# Patient Record
Sex: FEMALE | Race: WHITE | NOT HISPANIC OR LATINO | Employment: OTHER | URBAN - METROPOLITAN AREA
[De-identification: names, ages, dates, MRNs, and addresses within clinical notes are randomized per-mention and may not be internally consistent; named-entity substitution may affect disease eponyms.]

---

## 2017-01-10 ENCOUNTER — GENERIC CONVERSION - ENCOUNTER (OUTPATIENT)
Dept: OTHER | Facility: OTHER | Age: 67
End: 2017-01-10

## 2017-02-20 DIAGNOSIS — N18.4 CHRONIC KIDNEY DISEASE, STAGE IV (SEVERE) (HCC): ICD-10-CM

## 2017-04-07 ENCOUNTER — GENERIC CONVERSION - ENCOUNTER (OUTPATIENT)
Dept: OTHER | Facility: OTHER | Age: 67
End: 2017-04-07

## 2017-04-15 ENCOUNTER — HOSPITAL ENCOUNTER (EMERGENCY)
Facility: HOSPITAL | Age: 67
Discharge: HOME | End: 2017-04-15
Attending: EMERGENCY MEDICINE
Payer: MEDICARE

## 2017-04-15 ENCOUNTER — APPOINTMENT (EMERGENCY)
Dept: RADIOLOGY | Facility: HOSPITAL | Age: 67
End: 2017-04-15
Payer: MEDICARE

## 2017-04-15 VITALS
DIASTOLIC BLOOD PRESSURE: 60 MMHG | RESPIRATION RATE: 18 BRPM | HEART RATE: 72 BPM | WEIGHT: 151.68 LBS | SYSTOLIC BLOOD PRESSURE: 136 MMHG | TEMPERATURE: 98.3 F | OXYGEN SATURATION: 100 % | BODY MASS INDEX: 24.48 KG/M2

## 2017-04-15 DIAGNOSIS — B97.89 VIRAL RESPIRATORY INFECTION: Primary | ICD-10-CM

## 2017-04-15 DIAGNOSIS — J98.8 VIRAL RESPIRATORY INFECTION: Primary | ICD-10-CM

## 2017-04-15 PROCEDURE — 99283 EMERGENCY DEPT VISIT LOW MDM: CPT

## 2017-04-15 PROCEDURE — 71020 HB CHEST X-RAY 2VW FRONTAL&LATL: CPT

## 2017-04-15 RX ORDER — CITALOPRAM 10 MG/1
10 TABLET ORAL DAILY
COMMUNITY

## 2017-04-15 RX ORDER — CYCLOSPORINE 0.5 MG/ML
1 EMULSION OPHTHALMIC 2 TIMES DAILY
COMMUNITY

## 2017-04-18 ENCOUNTER — ALLSCRIPTS OFFICE VISIT (OUTPATIENT)
Dept: OTHER | Facility: OTHER | Age: 67
End: 2017-04-18

## 2017-06-05 ENCOUNTER — ALLSCRIPTS OFFICE VISIT (OUTPATIENT)
Dept: OTHER | Facility: OTHER | Age: 67
End: 2017-06-05

## 2017-06-05 LAB
BILIRUB UR QL STRIP: NEGATIVE
CLARITY UR: NORMAL
COLOR UR: YELLOW
GLUCOSE (HISTORICAL): NEGATIVE
HGB UR QL STRIP.AUTO: NORMAL
KETONES UR STRIP-MCNC: NEGATIVE MG/DL
LEUKOCYTE ESTERASE UR QL STRIP: NORMAL
NITRITE UR QL STRIP: NEGATIVE
PH UR STRIP.AUTO: 5 [PH]
PROT UR STRIP-MCNC: 100 MG/DL
SP GR UR STRIP.AUTO: 1.02
UROBILINOGEN UR QL STRIP.AUTO: 0.2

## 2017-09-26 ENCOUNTER — GENERIC CONVERSION - ENCOUNTER (OUTPATIENT)
Dept: OTHER | Facility: OTHER | Age: 67
End: 2017-09-26

## 2017-10-03 ENCOUNTER — TRANSCRIBE ORDERS (OUTPATIENT)
Dept: LAB | Facility: CLINIC | Age: 67
End: 2017-10-03

## 2017-10-03 ENCOUNTER — ALLSCRIPTS OFFICE VISIT (OUTPATIENT)
Dept: OTHER | Facility: OTHER | Age: 67
End: 2017-10-03

## 2017-10-03 ENCOUNTER — APPOINTMENT (OUTPATIENT)
Dept: LAB | Facility: CLINIC | Age: 67
End: 2017-10-03
Payer: MEDICARE

## 2017-10-03 DIAGNOSIS — D64.9 ANEMIA, UNSPECIFIED: Primary | ICD-10-CM

## 2017-10-03 DIAGNOSIS — D64.9 ANEMIA, UNSPECIFIED: ICD-10-CM

## 2017-10-03 DIAGNOSIS — D64.9 ANEMIA: ICD-10-CM

## 2017-10-03 LAB
ABO GROUP BLD: NORMAL
BLD GP AB SCN SERPL QL: NEGATIVE
ERYTHROCYTE [DISTWIDTH] IN BLOOD BY AUTOMATED COUNT: 13.5 % (ref 11.6–15.1)
FERRITIN SERPL-MCNC: 304 NG/ML (ref 8–388)
HCT VFR BLD AUTO: 28.4 % (ref 34.8–46.1)
HGB BLD-MCNC: 8.5 G/DL (ref 11.5–15.4)
IRON SATN MFR SERPL: 11 %
IRON SERPL-MCNC: 39 UG/DL (ref 50–170)
LDH SERPL-CCNC: 295 U/L (ref 81–234)
MCH RBC QN AUTO: 30.6 PG (ref 26.8–34.3)
MCHC RBC AUTO-ENTMCNC: 29.9 G/DL (ref 31.4–37.4)
MCV RBC AUTO: 102 FL (ref 82–98)
PLATELET # BLD AUTO: 274 THOUSANDS/UL (ref 149–390)
PMV BLD AUTO: 9.5 FL (ref 8.9–12.7)
RBC # BLD AUTO: 2.78 MILLION/UL (ref 3.81–5.12)
RETICS # AUTO: NORMAL 10*3/UL (ref 14097–95744)
RETICS # CALC: 1.24 % (ref 0.37–1.87)
RH BLD: POSITIVE
SPECIMEN EXPIRATION DATE: NORMAL
TIBC SERPL-MCNC: 343 UG/DL (ref 250–450)
VIT B12 SERPL-MCNC: 1249 PG/ML (ref 100–900)
WBC # BLD AUTO: 10.5 THOUSAND/UL (ref 4.31–10.16)

## 2017-10-03 PROCEDURE — 83010 ASSAY OF HAPTOGLOBIN QUANT: CPT

## 2017-10-03 PROCEDURE — 82728 ASSAY OF FERRITIN: CPT

## 2017-10-03 PROCEDURE — 83615 LACTATE (LD) (LDH) ENZYME: CPT

## 2017-10-03 PROCEDURE — 83540 ASSAY OF IRON: CPT

## 2017-10-03 PROCEDURE — 86900 BLOOD TYPING SEROLOGIC ABO: CPT

## 2017-10-03 PROCEDURE — 82607 VITAMIN B-12: CPT

## 2017-10-03 PROCEDURE — 86850 RBC ANTIBODY SCREEN: CPT

## 2017-10-03 PROCEDURE — 85045 AUTOMATED RETICULOCYTE COUNT: CPT

## 2017-10-03 PROCEDURE — 36415 COLL VENOUS BLD VENIPUNCTURE: CPT

## 2017-10-03 PROCEDURE — 83550 IRON BINDING TEST: CPT

## 2017-10-03 PROCEDURE — 85027 COMPLETE CBC AUTOMATED: CPT

## 2017-10-03 PROCEDURE — 86901 BLOOD TYPING SEROLOGIC RH(D): CPT

## 2017-10-03 PROCEDURE — 82668 ASSAY OF ERYTHROPOIETIN: CPT

## 2017-10-04 LAB
EPO SERPL-ACNC: 18.6 MIU/ML (ref 2.6–18.5)
HAPTOGLOB SERPL-MCNC: 200 MG/DL (ref 34–200)

## 2017-10-04 NOTE — CONSULTS
Assessment  1  Anemia (285 9) (D64 9)    Plan  Anemia    · (1) CBC/ PLT (NO DIFF); Status:Active; Requested SRD:[id number];    Perform:[facility]; FEX:[id number]; Ordered; Tim Deleon; Ordered By:Rosalba Abdalla;   · (1) ERYTHROPOIETIN; Status:Active; Requested YGL:[id number];    Perform:[facility]; OBN:[id number]; Ordered; Tim Deleon; Ordered By:Rosalba Abdalla;   · (1) HAPTOGLOBIN; Status:Active; Requested VTW:[id number];    Perform:[facility]; BKJ:[id number]; Ordered; Tim Deleon; Ordered By:Rosalba Abdalla;   · (1) IRON PANEL; Status:Active; Requested WQE:[id number];    Perform:[facility]; DQS:[id number]; Ordered; Tim Deleon; Ordered By:Rosalba Abdalla;   · (1) LD (LDH); Status:Active; Requested MTF:[id number];    Perform:[facility]; BWX:[id number]; Ordered; Tim Deleon; Ordered By:Rosalba Abdalla;   · (1) RETICULOCYTE COUNT; Status:Active; Requested WMQ:[id number];    Perform:[facility]; VZJ:[id number]; Ordered; Tim Deleon; Ordered By:Rosalba Abdalla;   · (1) TYPE & SCREEN; Status:Active; Requested WAF:[id number];    Perform:[facility]; FMR:[id number]; Ordered; Tim Deleon; Ordered By:Rosalba Abdalla;  currently receiving a biologic? : Yes  the patient pregnant or have they been in the last 90 days? : Yes  the patient been transfused in the last 3 months? : Yes  or PreOp : Medical   · (1) VITAMIN B12; Status:Active; Requested GZP:[id number];    Perform:[facility]; QRE:[id number]; Ordered; Tim Deleon; Ordered By:Rosalba Abdalla;   · Follow-up visit in 1 week Evaluation and Treatment  Follow-up  Status: Complete  Done:  [id number]   Ordered; For: Anemia; Ordered By: Chandu Arguello Performed:  Order Comments: blood work todayplease shcedule blood transfusion x 1 unit next weekF/U in one week with Dr Reena Medrano, then plan for transfusion afterwards  Due: 69FXU9598; Last Updated By: Cornelius Acosta; 10/3/2017 2:02:39 PM    Discussion/Summary    Anemia : multifactorial, likely drug - related and CKD  no blood loss or hemolysis  However tacrolimus has been reported to cause of hemolysis, need to rule out  CBC, CMP, haptoglobin, LDH, iron panel, B12 level, retic count, erythropoietin level  she is having symptoms, favoring blood transfusion, depending on her hemoglobin level  Will also give Aranesp if her erythropoietin level is low, which could due to CKD  on her level decided whether she should take iron or no  in 1 week  Chief Complaint   I have anemia       History of Present Illness  Pt is a pleasant 68-year-old female, with history of idiopathic pulmonary fibrosis, status post bilateral lung transplant in [facility] in [place], history of right femur fracture status post ORIF and blood transfusion due to anemia, possible CKD, currently on tacrolimus and CellCept for immunosuppression due to lung transplant, following a hematologist in [facility] for possible medication related anemia and leukocytopenia, referred to local hematology for comanagement  came in today with her , reported feeling positional lightheadedness and exertional fatigue for at least 6 months, possibly year  She otherwise reported no new weight loss, acute energy level change, fever, night sweat, lumps bumps, bleeding  She reported no change of her skin color, no hemoptysis, normal color of her urine and stool  She has been compliant with her tacrolimus and CellCept  She received multiple blood transfusions, last transfusion last summer after hip surgery  is a nonsmoker, no drinking ETOH, lives with her , no prior hematology, oncology issues before lung transplant  She will continue to follow her hematologist in [facility]  1, anemia: July 7, 2016, status post bone marrow biopsy to evaluate pancytopenia, result is not revealing, possible early MDS  Received a blood transfusion during surgery, last transfusion June of 2016  leukocytopenia, neutropenia: Received Neupogen, Neulasta, developed significant side-effect , including bone pain       Review of Systems    Constitutional: feeling poorly-and-feeling tired, but-no fever,-no recent weight gain,-no chills-and-no recent weight loss  Eyes: No complaints of eye pain, no red eyes, no eyesight problems, no discharge, no dry eyes, no itching of eyes  ENT: no complaints of earache, no loss of hearing, no nose bleeds, no nasal discharge, no sore throat, no hoarseness  Cardiovascular: No complaints of slow heart rate, no fast heart rate, no chest pain, no palpitations, no leg claudication, no lower extremity edema  Respiratory: No complaints of shortness of breath, no wheezing, no cough, no SOB on exertion, no orthopnea, no PND  Gastrointestinal: No complaints of abdominal pain, no constipation, no nausea or vomiting, no diarrhea, no bloody stools  Genitourinary: No complaints of dysuria, no incontinence, no pelvic pain, no dysmenorrhea, no vaginal discharge or bleeding  Musculoskeletal: No complaints of arthralgias, no myalgias, no joint swelling or stiffness, no limb pain or swelling  Integumentary: No complaints of skin rash or lesions, no itching, no skin wounds, no breast pain or lump  Neurological: No complaints of headache, no confusion, no convulsions, no numbness, no dizziness or fainting, no tingling, no limb weakness, no difficulty walking  Psychiatric: Not suicidal, no sleep disturbance, no anxiety or depression, no change in personality, no emotional problems  Endocrine: No complaints of proptosis, no hot flashes, no muscle weakness, no deepening of the voice, no feelings of weakness  Hematologic/Lymphatic: No complaints of swollen glands, no swollen glands in the neck, does not bleed easily, does not bruise easily  Active Problems  1  Abnormal findings on diagnostic imaging of other specified body structures (793 99)   (R93 8)   2  Abnormal liver function test (790 6) (R79 89)   3  Acute bronchitis (466 0) (J20 9)   4   Acute Interstitial Lung Disease (136 3)   5  Acute renal failure (584 9) (N17 9)   6  Acute upper respiratory infection (465 9) (J06 9)   7  Anemia (285 9) (D64 9)   8  Anxiety (300 00) (F41 9)   9  Arthritis (716 90) (M19 90)   10  Cervical pain (neck) (723 1) (M54 2)   11  Cervical smear, as part of routine gynecological examination (V76 2) (Z01 419)   12  Cough (786 2) (R05)   13  Depression (311) (F32 9)   14  Encounter for monitoring cardiotoxic drug therapy (V58 83,V58 69) (Z51 81,Z79 899)   15  Encounter for routine gynecological examination (V72 31) (Z01 419)   16  Hypercholesterolemia (272 0) (E78 00)   17  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)   18  Idiopathic pulmonary fibrosis (516 31) (J84 112)   19  Lumbar canal stenosis (724 02) (M48 061)   20  Muscle weakness (728 87) (M62 81)   21  Nonspecific abnormal toxicological finding (796 0) (R89 2)   22  Right calf pain (729 5) (M79 661)   23  Screening for genitourinary condition (V81 6) (Z13 89)   24  Screening for neurological condition (V80 09) (Z13 89)   25  Segmental and somatic dysfunction of sacral region (739 4) (M99 04)   26  Skin rash (782 1) (R21)   27  Sudden visual loss (368 11) (H53 139)   28  Trapezius muscle spasm (728 85) (M62 838)   29  Urinary tract infection (599 0) (N39 0)   30  Visit for gynecologic examination (V72 31) (Z01 419)   31  Visit for pre-operative examination (V72 84) (A94 780)    Past Medical History    The active problems and past medical history were reviewed and updated today  Surgical History  1  History of Appendectomy   2  History of Arthroscopy Knee   3  History of Tonsillectomy    The surgical history was reviewed and updated today  Family History  Mother    1  Family history of Hypertension (V17 49)   2  Family history of Malignant Melanoma Of The Skin (V16 8)  Father    3  Family history of Chronic Obstructive Pulmonary Disease   4  Family history of Essential Hypertension   5  Family history of Lung Cancer (V16 1)  Sister    6  Family history of Systemic Lupus Erythematosus    The family history was reviewed and updated today  Social History   · Denied: History of Alcohol Use (History)   · Denied: Alcohol Use (History)   · Daily Coffee Consumption (___ Cups/Day)   · Daily Cola Consumption (___ Cans/Day)   · Daily Tea Consumption (___ Cups/Day)   · Denied: Drug Use (305 90)   · Marital History - Currently    · Never A Smoker  The social history was reviewed and updated today  Current Meds   1  Atovaquone 750 MG/5ML Oral Suspension; 1 tsp q 12 hrs Recorded   2  B Complex Oral Tablet; Take 1 tablet daily Recorded   3  Calcium 600 + D TABS; Therapy: ([phone] [id number]) to Recorded   4  Ciprofloxacin HCl - 250 MG Oral Tablet; TAKE 1 TABLET EVERY 12 HOURS DAILY; Therapy: 44OIB7577 to (Evaluate:15Jun2017)  Requested for: 75JTV0042; Last   Rx:05Jun2017 Ordered   5  Citalopram Hydrobromide 10 MG Oral Tablet; Take 1 tablet daily Recorded   6  [employer] 8 6 MG Oral Tablet; Take as directed Recorded   7  Docusate Sodium 100 MG Oral Capsule; Therapy: (029 [id number]) to Recorded   8  Forteo 600 MCG/2 4ML Subcutaneous Solution; INJECT SUBCUTANEOUSLY AS   DIRECTED Recorded   9  Gabapentin 300 MG Oral Capsule; TAKE 1 CAPSULE 3 times daily Recorded   10  Gabapentin 300 MG TABS; Therapy: (Recorded:03Oct2017) to Recorded   11  Ipratropium Bromide 0 06 % Nasal Solution; USE 2 SPRAYS IN EACH NOSTRIL 3 TIMES    DAILY; Therapy: (Recorded:90Rbz2281) to Recorded   12  Mag64 535 (64 Mg) MG Oral Tablet Extended Release; TAKE 2 TABLET Daily Recorded   13  Metoprolol Tartrate 25 MG Oral Tablet; Therapy: ([phone]) to Recorded   14  Multivitamins Oral Capsule; Therapy: (Marisol Valiente) to Recorded   15  Mycophenolate Mofetil 250 MG Oral Capsule; Therapy: (029 [id number]) to Recorded   16  Pantoprazole Sodium 40 MG Oral Tablet Delayed Release; Take 1 tablet daily Recorded   17   Pravastatin Sodium 40 MG Oral Tablet; Take 1 tablet daily Recorded   18  PredniSONE 5 MG Oral Tablet; Take 1 tablet daily Recorded   19  Restasis 0 05 % Ophthalmic Emulsion; USE AS DIRECTED Recorded   20  Tacrolimus CAPS; Therapy: [phone]) to Recorded   21  TraMADol HCl - 50 MG Oral Tablet; Take 1 tablet every 6 hours as needed for pain    Recorded    Allergies  1  Bactrim TABS    Vitals  Vital Signs    Recorded: 62WWG8386 01:16PM   Temperature 97 4 F   Heart Rate 107   Respiration 16   Systolic 869   Diastolic 84   Height 5 ft 7 in   Weight 160 lb 5 oz   BMI Calculated 25 11   BSA Calculated 1 84   O2 Saturation 97   Pain Scale 0     Physical Exam    Constitutional   General appearance: No acute distress, well appearing and well nourished  Eyes   Conjunctiva and lids: No swelling, erythema or discharge  Ears, Nose, Mouth, and Throat   External inspection of ears and nose: Normal     Pulmonary   Respiratory effort: No increased work of breathing or signs of respiratory distress  Auscultation of lungs: Abnormal  -decreased breathing sounds  Cardiovascular   Palpation of heart: Normal PMI, no thrills  Auscultation of heart: Normal rate and rhythm, normal S1 and S2, without murmurs  Examination of extremities for edema and/or varicosities: Normal     Carotid pulses: Normal     Abdomen   Abdomen: Non-tender, no masses  Liver and spleen: No hepatomegaly or splenomegaly  Lymphatic   Palpation of lymph nodes in neck: No lymphadenopathy  Musculoskeletal   Gait and station: Normal     Digits and nails: Normal without clubbing or cyanosis  Inspection/palpation of joints, bones, and muscles: Normal     Skin   Skin and subcutaneous tissue: Normal without rashes or lesions      Psychiatric   Orientation to person, place, and time: Normal     Mood and affect: Normal         ECOG 2       Future Appointments    Date/Time Provider Specialty Site   10/10/2017 01:00 PM Rigoberto Merrill MD Hematology Oncology [facility] [place] [facility]     Signatures   Electronically signed by : Malina Liang MD; Oct  3 2017  4:31PM EST                       (Author)

## 2017-10-10 ENCOUNTER — GENERIC CONVERSION - ENCOUNTER (OUTPATIENT)
Dept: OTHER | Facility: OTHER | Age: 67
End: 2017-10-10

## 2017-10-23 RX ORDER — SODIUM CHLORIDE 9 MG/ML
20 INJECTION, SOLUTION INTRAVENOUS CONTINUOUS
Status: DISCONTINUED | OUTPATIENT
Start: 2017-10-24 | End: 2017-10-27 | Stop reason: HOSPADM

## 2017-10-24 ENCOUNTER — HOSPITAL ENCOUNTER (OUTPATIENT)
Dept: INFUSION CENTER | Facility: CLINIC | Age: 67
Discharge: HOME | End: 2017-10-24
Payer: MEDICARE

## 2017-10-24 VITALS
DIASTOLIC BLOOD PRESSURE: 65 MMHG | SYSTOLIC BLOOD PRESSURE: 143 MMHG | TEMPERATURE: 97.1 F | HEART RATE: 83 BPM | RESPIRATION RATE: 20 BRPM

## 2017-10-24 PROCEDURE — 96365 THER/PROPH/DIAG IV INF INIT: CPT

## 2017-10-24 PROCEDURE — 96375 TX/PRO/DX INJ NEW DRUG ADDON: CPT

## 2017-10-24 RX ADMIN — DIPHENHYDRAMINE HYDROCHLORIDE 25 MG: 50 INJECTION, SOLUTION INTRAMUSCULAR; INTRAVENOUS at 09:49

## 2017-10-24 RX ADMIN — SODIUM CHLORIDE 20 ML/HR: 0.9 INJECTION, SOLUTION INTRAVENOUS at 09:49

## 2017-10-24 RX ADMIN — IRON SUCROSE 200 MG: 20 INJECTION, SOLUTION INTRAVENOUS at 10:07

## 2017-10-24 NOTE — PLAN OF CARE
Problem: Potential for Falls  Goal: Patient will remain free of falls  INTERVENTIONS:  - Assess patient frequently for physical needs  -  Identify cognitive and physical deficits and behaviors that affect risk of falls    -  [place] fall precautions as indicated by assessment   - Educate patient/family on patient safety including physical limitations  - Instruct patient to call for assistance with activity based on assessment  - Modify environment to reduce risk of injury  - Consider OT/PT consult to assist with strengthening/mobility   Outcome: Progressing

## 2017-10-24 NOTE — PROGRESS NOTES
Pt resting with no complaints  IV started without issue  Vitals stable; callbell within reach  Will continue to monitor

## 2017-10-26 RX ORDER — SODIUM CHLORIDE 9 MG/ML
20 INJECTION, SOLUTION INTRAVENOUS CONTINUOUS
Status: DISCONTINUED | OUTPATIENT
Start: 2017-10-30 | End: 2017-11-02 | Stop reason: HOSPADM

## 2017-10-30 ENCOUNTER — HOSPITAL ENCOUNTER (OUTPATIENT)
Dept: INFUSION CENTER | Facility: CLINIC | Age: 67
Discharge: HOME | End: 2017-10-30
Payer: MEDICARE

## 2017-10-30 VITALS
SYSTOLIC BLOOD PRESSURE: 146 MMHG | TEMPERATURE: 97.9 F | HEART RATE: 89 BPM | RESPIRATION RATE: 20 BRPM | DIASTOLIC BLOOD PRESSURE: 67 MMHG

## 2017-10-30 PROCEDURE — 96367 TX/PROPH/DG ADDL SEQ IV INF: CPT

## 2017-10-30 PROCEDURE — 96365 THER/PROPH/DIAG IV INF INIT: CPT

## 2017-10-30 RX ADMIN — IRON SUCROSE 200 MG: 20 INJECTION, SOLUTION INTRAVENOUS at 14:17

## 2017-10-30 RX ADMIN — DIPHENHYDRAMINE HYDROCHLORIDE 25 MG: 50 INJECTION, SOLUTION INTRAMUSCULAR; INTRAVENOUS at 13:50

## 2017-10-30 RX ADMIN — SODIUM CHLORIDE 20 ML/HR: 0.9 INJECTION, SOLUTION INTRAVENOUS at 13:45

## 2017-11-06 RX ORDER — SODIUM CHLORIDE 9 MG/ML
20 INJECTION, SOLUTION INTRAVENOUS CONTINUOUS
Status: DISCONTINUED | OUTPATIENT
Start: 2017-11-07 | End: 2017-11-10 | Stop reason: HOSPADM

## 2017-11-07 ENCOUNTER — HOSPITAL ENCOUNTER (OUTPATIENT)
Dept: INFUSION CENTER | Facility: CLINIC | Age: 67
Discharge: HOME | End: 2017-11-07
Payer: MEDICARE

## 2017-11-07 VITALS
DIASTOLIC BLOOD PRESSURE: 66 MMHG | RESPIRATION RATE: 18 BRPM | TEMPERATURE: 97.1 F | SYSTOLIC BLOOD PRESSURE: 129 MMHG | HEART RATE: 79 BPM

## 2017-11-07 PROCEDURE — 96367 TX/PROPH/DG ADDL SEQ IV INF: CPT

## 2017-11-07 PROCEDURE — 96365 THER/PROPH/DIAG IV INF INIT: CPT

## 2017-11-07 RX ADMIN — IRON SUCROSE 200 MG: 20 INJECTION, SOLUTION INTRAVENOUS at 12:02

## 2017-11-07 RX ADMIN — SODIUM CHLORIDE 20 ML/HR: 0.9 INJECTION, SOLUTION INTRAVENOUS at 11:40

## 2017-11-07 RX ADMIN — DIPHENHYDRAMINE HYDROCHLORIDE 25 MG: 50 INJECTION, SOLUTION INTRAMUSCULAR; INTRAVENOUS at 11:43

## 2017-11-07 NOTE — PLAN OF CARE
Problem: Potential for Falls  Goal: Patient will remain free of falls  INTERVENTIONS:  - Assess patient frequently for physical needs  -  Identify cognitive and physical deficits and behaviors that affect risk of falls    -  [place] fall precautions as indicated by assessment   - Educate patient/family on patient safety including physical limitations  - Instruct patient to call for assistance with activity based on assessment  - Modify environment to reduce risk of injury  - Consider OT/PT consult to assist with strengthening/mobility   Outcome: Progressing

## 2017-11-07 NOTE — PROGRESS NOTES
Pt to clinic for dose 3 of 3 planned doses of venofer  She reports tolerating previous doses well and offers no c/o today

## 2017-11-13 RX ORDER — SODIUM CHLORIDE 9 MG/ML
20 INJECTION, SOLUTION INTRAVENOUS CONTINUOUS
Status: DISCONTINUED | OUTPATIENT
Start: 2017-11-14 | End: 2017-11-17 | Stop reason: HOSPADM

## 2017-11-14 ENCOUNTER — HOSPITAL ENCOUNTER (OUTPATIENT)
Dept: INFUSION CENTER | Facility: CLINIC | Age: 67
Discharge: HOME | End: 2017-11-14
Payer: MEDICARE

## 2017-11-14 VITALS
HEART RATE: 78 BPM | TEMPERATURE: 97.9 F | RESPIRATION RATE: 18 BRPM | SYSTOLIC BLOOD PRESSURE: 127 MMHG | DIASTOLIC BLOOD PRESSURE: 61 MMHG

## 2017-11-14 PROCEDURE — 96365 THER/PROPH/DIAG IV INF INIT: CPT

## 2017-11-14 PROCEDURE — 96375 TX/PRO/DX INJ NEW DRUG ADDON: CPT

## 2017-11-14 RX ADMIN — SODIUM CHLORIDE 20 ML/HR: 0.9 INJECTION, SOLUTION INTRAVENOUS at 11:44

## 2017-11-14 RX ADMIN — DIPHENHYDRAMINE HYDROCHLORIDE 25 MG: 50 INJECTION, SOLUTION INTRAMUSCULAR; INTRAVENOUS at 11:44

## 2017-11-14 RX ADMIN — IRON SUCROSE 200 MG: 20 INJECTION, SOLUTION INTRAVENOUS at 12:01

## 2017-11-14 NOTE — PLAN OF CARE
Problem: Potential for Falls  Goal: Patient will remain free of falls  INTERVENTIONS:  - Assess patient frequently for physical needs  -  Identify cognitive and physical deficits and behaviors that affect risk of falls    -  [place] fall precautions as indicated by assessment   - Educate patient/family on patient safety including physical limitations  - Instruct patient to call for assistance with activity based on assessment  - Modify environment to reduce risk of injury  - Consider OT/PT consult to assist with strengthening/mobility   Outcome: Progressing

## 2017-11-21 ENCOUNTER — HOSPITAL ENCOUNTER (OUTPATIENT)
Dept: INFUSION CENTER | Facility: CLINIC | Age: 67
Discharge: HOME | End: 2017-11-21
Payer: MEDICARE

## 2017-11-21 VITALS — SYSTOLIC BLOOD PRESSURE: 122 MMHG | DIASTOLIC BLOOD PRESSURE: 60 MMHG | HEART RATE: 79 BPM

## 2017-11-21 PROCEDURE — 96372 THER/PROPH/DIAG INJ SC/IM: CPT

## 2017-11-21 RX ADMIN — DARBEPOETIN ALFA 100 MCG: 100 INJECTION, SOLUTION INTRAVENOUS; SUBCUTANEOUS at 14:43

## 2017-11-21 NOTE — PLAN OF CARE
Problem: Potential for Falls  Goal: Patient will remain free of falls  INTERVENTIONS:  - Assess patient frequently for physical needs  -  Identify cognitive and physical deficits and behaviors that affect risk of falls    -  [place] fall precautions as indicated by assessment   - Educate patient/family on patient safety including physical limitations  - Instruct patient to call for assistance with activity based on assessment  - Modify environment to reduce risk of injury  - Consider OT/PT consult to assist with strengthening/mobility   Outcome: Progressing

## 2017-11-21 NOTE — PROGRESS NOTES
Patient here for aranesp and is doing well, she tolerated aranesp as ordered QUINTON, bandaid applied  Next dosing 11/28/17

## 2017-11-28 ENCOUNTER — HOSPITAL ENCOUNTER (OUTPATIENT)
Dept: INFUSION CENTER | Facility: CLINIC | Age: 67
Discharge: HOME | End: 2017-11-28
Payer: MEDICARE

## 2017-11-28 PROCEDURE — 96372 THER/PROPH/DIAG INJ SC/IM: CPT

## 2017-11-28 RX ADMIN — DARBEPOETIN ALFA 100 MCG: 100 INJECTION, SOLUTION INTRAVENOUS; SUBCUTANEOUS at 13:03

## 2017-11-28 NOTE — PROGRESS NOTES
Pt resting with no complaints  Aranesp given in [facility] without issue  Declined AVS  Pt aware of next appt

## 2017-11-28 NOTE — PLAN OF CARE
Problem: Potential for Falls  Goal: Patient will remain free of falls  INTERVENTIONS:  - Assess patient frequently for physical needs  -  Identify cognitive and physical deficits and behaviors that affect risk of falls    -  [place] fall precautions as indicated by assessment   - Educate patient/family on patient safety including physical limitations  - Instruct patient to call for assistance with activity based on assessment  - Modify environment to reduce risk of injury  - Consider OT/PT consult to assist with strengthening/mobility   Outcome: Progressing

## 2017-12-05 ENCOUNTER — HOSPITAL ENCOUNTER (OUTPATIENT)
Dept: INFUSION CENTER | Facility: CLINIC | Age: 67
Discharge: HOME | End: 2017-12-05
Payer: MEDICARE

## 2017-12-05 VITALS
HEART RATE: 85 BPM | TEMPERATURE: 97.9 F | SYSTOLIC BLOOD PRESSURE: 138 MMHG | OXYGEN SATURATION: 99 % | DIASTOLIC BLOOD PRESSURE: 65 MMHG | RESPIRATION RATE: 18 BRPM

## 2017-12-05 PROCEDURE — 96372 THER/PROPH/DIAG INJ SC/IM: CPT

## 2017-12-05 RX ADMIN — DARBEPOETIN ALFA 100 MCG: 100 INJECTION, SOLUTION INTRAVENOUS; SUBCUTANEOUS at 10:26

## 2017-12-12 ENCOUNTER — HOSPITAL ENCOUNTER (OUTPATIENT)
Dept: INFUSION CENTER | Facility: CLINIC | Age: 67
Discharge: HOME | End: 2017-12-12
Payer: MEDICARE

## 2017-12-12 VITALS
DIASTOLIC BLOOD PRESSURE: 58 MMHG | RESPIRATION RATE: 18 BRPM | HEART RATE: 84 BPM | SYSTOLIC BLOOD PRESSURE: 132 MMHG | OXYGEN SATURATION: 97 % | TEMPERATURE: 98.3 F

## 2017-12-12 PROCEDURE — 96372 THER/PROPH/DIAG INJ SC/IM: CPT

## 2017-12-12 RX ADMIN — DARBEPOETIN ALFA 100 MCG: 100 INJECTION, SOLUTION INTRAVENOUS; SUBCUTANEOUS at 09:59

## 2017-12-19 ENCOUNTER — ALLSCRIPTS OFFICE VISIT (OUTPATIENT)
Dept: OTHER | Facility: OTHER | Age: 67
End: 2017-12-19

## 2017-12-20 NOTE — PROGRESS NOTES
Assessment  1  Anemia (285 9) (D64 9)   2  Stage 4 chronic kidney disease (585 4) (N18 4)    Plan  Stage 4 chronic kidney disease    · (1) IRON PANEL; Status:Active; Requested for:[id number];    Perform:[facility]; C:[id number]; Ordered; For:Stage 4 chronic kidney disease; Ordered By:Guera Abdalla;   · Follow-up visit in 10 weeks Evaluation and Treatment  please shceudle procrit / venofer x4 weekly after [facility] i n the end of 2/2018Follow-up after blood work  Status: Complete  Done: [id number] 10:00AM   Ordered; For: Stage 4 chronic kidney disease; Ordered By: Catrachita Zuniga Performed:  Due: [id number]; Last Updated By: Nirav Officer; 12/19/2017 2:04:00 PM    Discussion/Summary  Discussion Summary:   Anemia : Normocytic; multifactorial, likely drug (tacrolimus) - related , and CKD  repeat work up showed no blood loss or hemolysis  -- iron panel showed saturation 11%, ferritin 304  After 4 weekly treatment of N0 for an Aranesp, hemoglobin slightly increased from 7 7-8 4  Goal of hemoglobin is from 10-11  plan--continue 4 more iv iron Venofer 300 mg iv weekly x 4; then give Aranesp 100 mcg weekly x 4-- repeat CBC, iron panel  B12 in the end of February  --Call for any severe pain or bleeding  Follow-up appointment in February-- pt will also follow with Hematologist at [facility]  Counseling Documentation With Imm: The patient was counseled regarding diagnostic results  total time of encounter was 25 minutes-- and-- 15 minutes was spent counseling  Chief Complaint  Chief Complaint Free Text Note Form: follow up for anemia      History of Present Illness  HPI: Pt came in today for follow up with her   She reported overall at her baseline health status  She has no bleeding; she has mild chronic dizziness has been going on for months; She has no palpitation, chest pain  Her energy / mobility is at baseline  Pt is a pleasant 58-year-old female, with history of idiopathic pulmonary fibrosis, status post bilateral lung transplant in [facility] in [place], history of right femur fracture status post ORIF and blood transfusion due to anemia, possible CKD, currently on tacrolimus and CellCept for immunosuppression due to lung transplant, following a hematologist in [facility] for possible medication related anemia and leukocytopenia, referred to local hematology for comanagement  She reported no new weight loss, acute energy level change, fever, night sweat, lumps bumps, bleeding  She reported no change of her skin color, no hemoptysis, normal color of her urine and stool  She has been compliant with her tacrolimus and CellCept  She received multiple blood transfusions, last transfusion last summer after hip surgery  She is a nonsmoker, no drinking ETOH, lives with her , no prior hematology, oncology issues before lung transplant  She will continue to follow her hematologist in [place]  Previous Therapy:   1, anemia: July 7, 2016, status post bone marrow biopsy to evaluate pancytopenia, result is not revealing, possible early MDS  Received a blood transfusion during surgery, last transfusion June of 2016  Her post-lung transplant Hb is in the range of 8-10, lowest 7 5 2, leukocytopenia, neutropenia: Received Neupogen, Neulasta, developed significant side-effect , including bone pain  Interval History: Anemia, secondary to CKD, tacrolimus, frequent blood draw  On Venofer 300 milligrams, Aranesp 100 milligrams weekly  Received 4 weekly treatment of and for Aranesp in October to November 2017  Hemoglobin increased from 7 7-8  4  Patient and family came in today for follow-up  Reported energy level has been increasing, there is no bleeding  Still taking tacrolimus  Being followed in [facility] for hematology, endocrinology, transplant doctors  Patient has no specific complaints        Review of Systems  Complete-Female:  Constitutional: feeling poorly-- and-- feeling tired, but-- no fever,-- no recent weight gain,-- no chills-- and-- no recent weight loss  Eyes: No complaints of eye pain, no red eyes, no eyesight problems, no discharge, no dry eyes, no itching of eyes  ENT: no complaints of earache, no loss of hearing, no nose bleeds, no nasal discharge, no sore throat, no hoarseness  Cardiovascular: No complaints of slow heart rate, no fast heart rate, no chest pain, no palpitations, no leg claudication, no lower extremity edema  Respiratory: No complaints of shortness of breath, no wheezing, no cough, no SOB on exertion, no orthopnea, no PND  Gastrointestinal: No complaints of abdominal pain, no constipation, no nausea or vomiting, no diarrhea, no bloody stools  Genitourinary: No complaints of dysuria, no incontinence, no pelvic pain, no dysmenorrhea, no vaginal discharge or bleeding  Musculoskeletal: No complaints of arthralgias, no myalgias, no joint swelling or stiffness, no limb pain or swelling  Integumentary: No complaints of skin rash or lesions, no itching, no skin wounds, no breast pain or lump  Neurological: No complaints of headache, no confusion, no convulsions, no numbness, no dizziness or fainting, no tingling, no limb weakness, no difficulty walking  Psychiatric: Not suicidal, no sleep disturbance, no anxiety or depression, no change in personality, no emotional problems  Endocrine: No complaints of proptosis, no hot flashes, no muscle weakness, no deepening of the voice, no feelings of weakness  Hematologic/Lymphatic: No complaints of swollen glands, no swollen glands in the neck, does not bleed easily, does not bruise easily  Active Problems  1  Abnormal findings on diagnostic imaging of other specified body structures (793 99) (R93 8)   2  Abnormal liver function test (790 6) (R79 89)   3  Acute bronchitis (466 0) (J20 9)   4  Acute Interstitial Lung Disease (136 3)   5  Acute renal failure (584 9) (N17 9)   6  Acute upper respiratory infection (465 9) (J06 9)   7   Anemia (285  9) (D64 9)   8  Anxiety (300 00) (F41 9)   9  Arthritis (716 90) (M19 90)   10  Cervical pain (neck) (723 1) (M54 2)   11  Cervical smear, as part of routine gynecological examination (V76 2) (Z01 419)   12  Cough (786 2) (R05)   13  Depression (311) (F32 9)   14  Encounter for monitoring cardiotoxic drug therapy (V58 83,V58 69) (Z51 81,Z79 899)   15  Encounter for routine gynecological examination (V72 31) (Z01 419)   16  Hypercholesterolemia (272 0) (E78 00)   17  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)   18  Idiopathic pulmonary fibrosis (516 31) (J84 112)   19  Lumbar canal stenosis (724 02) (M48 061)   20  Muscle weakness (728 87) (M62 81)   21  Nonspecific abnormal toxicological finding (796 0) (R89 2)   22  Right calf pain (729 5) (M79 661)   23  Screening for genitourinary condition (V81 6) (Z13 89)   24  Screening for neurological condition (V80 09) (Z13 89)   25  Segmental and somatic dysfunction of sacral region (739 4) (M99 04)   26  Skin rash (782 1) (R21)   27  Sudden visual loss (368 11) (H53 139)   28  Trapezius muscle spasm (728 85) (M62 838)   29  Urinary tract infection (599 0) (N39 0)   30  Visit for gynecologic examination (V72 31) (Z01 419)   31  Visit for pre-operative examination (V72 84) (H36 151)    Past Medical History  Active Problems And Past Medical History Reviewed: The active problems and past medical history were reviewed and updated today  Surgical History  1  History of Appendectomy   2  History of Arthroscopy Knee   3  History of Tonsillectomy  Surgical History Reviewed: The surgical history was reviewed and updated today  Family History  Mother    1  Family history of Hypertension (V17 49)   2  Family history of Malignant Melanoma Of The Skin (V16 8)  Father    3  Family history of Chronic Obstructive Pulmonary Disease   4  Family history of Essential Hypertension   5  Family history of Lung Cancer (V16 1)  Sister    6   Family history of Systemic Lupus Erythematosus  Family History Reviewed: The family history was reviewed and updated today  Social History   · Denied: History of Alcohol Use (History)   · Denied: Alcohol Use (History)   · Daily Coffee Consumption (___ Cups/Day)   · Daily Cola Consumption (___ Cans/Day)   · Daily Tea Consumption (___ Cups/Day)   · Denied: Drug Use (305 90)   · Marital History - Currently    · Never A Smoker  Social History Reviewed: The social history was reviewed and updated today  Current Meds   1  Atovaquone 750 MG/5ML Oral Suspension; 1 tsp q 12 hrs Recorded   2  B Complex Oral Tablet; Take 1 tablet daily Recorded   3  Calcium 600 + D TABS; Therapy: (027 14 173) to Recorded   4  Citalopram Hydrobromide 10 MG Oral Tablet; Take 1 tablet daily Recorded   5  [employer] 8 6 MG Oral Tablet; Take as directed Recorded   6  Docusate Sodium 100 MG Oral Capsule; Therapy: (584 40 173) to Recorded   7  Forteo 600 MCG/2 4ML Subcutaneous Solution; INJECT SUBCUTANEOUSLY AS DIRECTED Recorded   8  Gabapentin 300 MG Oral Capsule; TAKE 1 CAPSULE 3 times daily Recorded   9  Gabapentin 300 MG TABS; Therapy: (798 36 173) to Recorded   10  Ipratropium Bromide 0 06 % Nasal Solution; USE 2 SPRAYS IN EACH NOSTRIL 3 TIMES  DAILY; Therapy: (Recorded:98Orw1141) to Recorded   11  Mag64 535 (64 Mg) MG Oral Tablet Extended Release; TAKE 2 TABLET Daily Recorded   12  Metoprolol Tartrate 25 MG Oral Tablet; Therapy: (523 35 173) to Recorded   13  Multivitamins Oral Capsule; Therapy: (Recorded:85Gdn9174) to Recorded   14  Mycophenolate Mofetil 250 MG Oral Capsule; Therapy: (895 00 173) to Recorded   15  Pantoprazole Sodium 40 MG Oral Tablet Delayed Release; Take 1 tablet daily Recorded   16  Pravastatin Sodium 40 MG Oral Tablet; Take 1 tablet daily Recorded   17  PredniSONE 5 MG Oral Tablet; Take 1 tablet daily Recorded   18  Restasis 0 05 % Ophthalmic Emulsion; USE AS DIRECTED Recorded   19   Tacrolimus CAPS;  Therapy: (Recorded:65Wgh8479) to Recorded   20  TraMADol HCl - 50 MG Oral Tablet; Take 1 tablet every 6 hours as needed for pain  Recorded    Allergies  1  Bactrim TABS    Vitals  Vital Signs    Recorded: 21ITT0339 01:15PM   Temperature 98 F   Heart Rate 99   Respiration 16   Systolic 286   Diastolic 74   Height 5 ft 7 in   Weight 165 lb 5 oz   BMI Calculated 25 89   BSA Calculated 1 86   O2 Saturation 94   Pain Scale 0     Physical Exam   Constitutional  General appearance: No acute distress, well appearing and well nourished  Eyes  Conjunctiva and lids: No swelling, erythema or discharge  Ears, Nose, Mouth, and Throat  External inspection of ears and nose: Normal    Pulmonary  Respiratory effort: No increased work of breathing or signs of respiratory distress  Auscultation of lungs: Abnormal  -- decreased breathing sounds  Cardiovascular  Palpation of heart: Normal PMI, no thrills  Auscultation of heart: Normal rate and rhythm, normal S1 and S2, without murmurs  Examination of extremities for edema and/or varicosities: Normal    Carotid pulses: Normal    Abdomen  Abdomen: Non-tender, no masses  Liver and spleen: No hepatomegaly or splenomegaly  Lymphatic  Palpation of lymph nodes in neck: No lymphadenopathy  Musculoskeletal  Gait and station: Normal    Digits and nails: Normal without clubbing or cyanosis  Inspection/palpation of joints, bones, and muscles: Normal    Skin  Skin and subcutaneous tissue: Normal without rashes or lesions     Psychiatric  Orientation to person, place, and time: Normal    Mood and affect: Normal         ECOG 2       Future Appointments    Date/Time Provider Specialty Site   03/20/2018 10:00 AM Tomasa Brittle, MD Hematology Oncology [facility] 1205     Signatures   Electronically signed by : Alex Webb MD; Dec 19 2017  5:39PM EST                       (Author)

## 2018-01-08 RX ORDER — SODIUM CHLORIDE 9 MG/ML
20 INJECTION, SOLUTION INTRAVENOUS CONTINUOUS
Status: DISCONTINUED | OUTPATIENT
Start: 2018-01-09 | End: 2018-01-12 | Stop reason: HOSPADM

## 2018-01-09 ENCOUNTER — HOSPITAL ENCOUNTER (OUTPATIENT)
Dept: INFUSION CENTER | Facility: CLINIC | Age: 68
Discharge: HOME | End: 2018-01-11
Payer: MEDICARE

## 2018-01-09 VITALS
DIASTOLIC BLOOD PRESSURE: 57 MMHG | SYSTOLIC BLOOD PRESSURE: 130 MMHG | HEART RATE: 80 BPM | TEMPERATURE: 98.6 F | RESPIRATION RATE: 18 BRPM

## 2018-01-09 PROCEDURE — 96365 THER/PROPH/DIAG IV INF INIT: CPT

## 2018-01-09 RX ADMIN — IRON SUCROSE 300 MG: 20 INJECTION, SOLUTION INTRAVENOUS at 10:07

## 2018-01-09 RX ADMIN — SODIUM CHLORIDE 20 ML/HR: 0.9 INJECTION, SOLUTION INTRAVENOUS at 10:02

## 2018-01-09 NOTE — PLAN OF CARE
Problem: Potential for Falls  Goal: Patient will remain free of falls  INTERVENTIONS:  - Assess patient frequently for physical needs  -  Identify cognitive and physical deficits and behaviors that affect risk of falls    -  [place] fall precautions as indicated by assessment   - Educate patient/family on patient safety including physical limitations  - Instruct patient to call for assistance with activity based on assessment  - Modify environment to reduce risk of injury  - Consider OT/PT consult to assist with strengthening/mobility   Outcome: Progressing

## 2018-01-10 NOTE — RESULT NOTES
Verified Results  (1) COMPREHENSIVE METABOLIC PANEL [id number] 07:56NN Lorenzo Avina     Test Name Result Flag Reference   GLUCOSE 98 mg/dL  65-99   Fasting reference interval   UREA NITROGEN (BUN) 51 mg/dL H 7-25   CREATININE 2 34 mg/dL H 0 50-0 99   For patients >52years of age, the reference limit  for Creatinine is approximately 13% higher for people  identified as -American  eGFR NON-AFR  AMERICAN 21 mL/min/1 73m2 L > OR = 60   eGFR AFRICAN AMERICAN 24 mL/min/1 73m2 L > OR = 60   BUN/CREATININE RATIO 22 (calc)  6-22   SODIUM 133 mmol/L L 135-146   POTASSIUM 4 9 mmol/L  3 5-5 3   CHLORIDE 108 mmol/L     CARBON DIOXIDE 11 mmol/L L 19-30   Analysis performed on aliquoted specimen, CO2 may be  decreased due to greater exposure of specimen to air     CALCIUM 8 1 mg/dL L 8 6-10 4   PROTEIN, TOTAL 5 8 g/dL L 6 1-8 1   ALBUMIN 3 1 g/dL L 3 6-5 1   GLOBULIN 2 7 g/dL (calc)  1 9-3 7   ALBUMIN/GLOBULIN RATIO 1 1 (calc)  1 0-2 5   BILIRUBIN, TOTAL 0 2 mg/dL  0 2-1 2   ALKALINE PHOSPHATASE 138 U/L H    AST 54 U/L H 10-35   ALT 19 U/L  6-29     (1) CBC/PLT/DIFF [id number] 01:40PM Wayne Avina   REPORT COMMENT:  Rodriguez Acosta [id number]     Test Name Result Flag Reference   WHITE BLOOD CELL COUNT 14 6 Thousand/uL H 3 8-10 8   RED BLOOD CELL COUNT 2 42 Million/uL L 3 80-5 10   HEMOGLOBIN 8 3 g/dL L 11 7-15 5   HEMATOCRIT 26 0 % L 35 0-45 0    4 fL H 80 0-100 0   MCH 34 4 pg H 27 0-33 0   MCHC 32 0 g/dL  32 0-36 0   RDW 23 3 % H 11 0-15 0   PLATELET COUNT 92 Thousand/uL L 140-400   MPV 8 6 fL  7 5-11 5   ABSOLUTE NEUTROPHILS 8614 cells/uL H 9818-8784   ABSOLUTE BAND NEUTROPHILS 2190 cells/uL H 0-750   ABSOLUTE METAMYELOCYTES 584 cells/uL H 0   ABSOLUTE MYELOCYTES 292 cells/uL H 0   ABSOLUTE LYMPHOCYTES 1752 cells/uL  850-3900   ABSOLUTE MONOCYTES 1168 cells/uL H 200-950   ABSOLUTE EOSINOPHILS 0 cells/uL L    ABSOLUTE BASOPHILS 0 cells/uL  0-200   NEUTROPHILS 59 %     BAND NEUTROPHILS 15 %     METAMYELOCYTES 4 % H    MYELOCYTES 2 % H    LYMPHOCYTES 12 %     MONOCYTES 8 %     EOSINOPHILS 0 %     BASOPHILS 0 %     PLATELET ESTIMATION   ADEQUATE   Review of the peripheral smear reveals  decreased numbers of platelets  CBC MORPHOLOGY   NORMAL   Macrocytosis 1 +  Anisocytosis 1 +  Toxic granulation present    See Below     Although an "automated CBC" was ordered, our  instrumentation detected an abnormality on  your patient's specimen requiring us to perform  a manual review

## 2018-01-12 VITALS
RESPIRATION RATE: 16 BRPM | DIASTOLIC BLOOD PRESSURE: 84 MMHG | SYSTOLIC BLOOD PRESSURE: 140 MMHG | HEIGHT: 67 IN | HEART RATE: 107 BPM | BODY MASS INDEX: 25.16 KG/M2 | WEIGHT: 160.31 LBS | OXYGEN SATURATION: 97 % | TEMPERATURE: 97.4 F

## 2018-01-14 VITALS
HEART RATE: 72 BPM | WEIGHT: 139 LBS | RESPIRATION RATE: 13 BRPM | TEMPERATURE: 98.5 F | HEIGHT: 67 IN | SYSTOLIC BLOOD PRESSURE: 134 MMHG | BODY MASS INDEX: 21.82 KG/M2 | DIASTOLIC BLOOD PRESSURE: 72 MMHG

## 2018-01-14 NOTE — PROGRESS NOTES
Chief Complaint  PT WAS HERE TODAY FOR AN EKG ,1 YEAR STATUS POST LUNG TRANSPLANT ,TO BE SENT TO [facility] FOR YEARLY EVAL      Active Problems    1  Abnormal findings on diagnostic imaging of other specified body structures (793 99)   (R93 8)   2  Abnormal liver function test (790 6) (R79 89)   3  Acute bronchitis (466 0) (J20 9)   4  Acute Interstitial Lung Disease (136 3)   5  Acute renal failure (584 9) (N17 9)   6  Anemia (285 9) (D64 9)   7  Anxiety (300 00) (F41 9)   8  Arthritis (716 90) (M19 90)   9  Cervical pain (neck) (723 1) (M54 2)   10  Cervical smear, as part of routine gynecological examination (V76 2) (Z01 419)   11  Cough (786 2) (R05)   12  Depression (311) (F32 9)   13  Encounter for monitoring cardiotoxic drug therapy (V58 83,V58 69) (Z51 81,Z79 899)   14  Encounter for routine gynecological examination (V72 31) (Z01 419)   15  Hypercholesterolemia (272 0) (E78 00)   16  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)   17  Idiopathic pulmonary fibrosis (516 31) (J84 112)   18  Lumbar canal stenosis (724 02) (M48 06)   19  Muscle weakness (728 87) (M62 81)   20  Nonspecific abnormal toxicological finding (796 0) (R89 2)   21  Right calf pain (729 5) (M79 661)   22  Screening for genitourinary condition (V81 6) (Z13 89)   23  Screening for neurological condition (V80 09) (Z13 89)   24  Segmental and somatic dysfunction of sacral region (739 4) (M99 04)   25  Skin rash (782 1) (R21)   26  Sudden visual loss (368 11) (H53 139)   27  Trapezius muscle spasm (728 85) (M62 838)   28  Upper respiratory infection (465 9) (J06 9)   29  Urinary tract infection (599 0) (N39 0)   30  Visit for gynecologic examination (V72 31) (Z01 419)   31  Visit for pre-operative examination (V72 84) (Z01 818)    Current Meds   1  Atorvastatin Calcium 40 MG Oral Tablet; take 1 tablet by mouth daily; Therapy: [id number] to (032 304 86 43)  Requested for: 29Ecy3828; Last   Rx:[id number] Ordered   2   Atovaquone 750 MG/5ML Oral Suspension; 1 tsp q 12 hrs Recorded   3  B Complex Oral Tablet; Take 1 tablet daily Recorded   4  Citalopram Hydrobromide 10 MG Oral Tablet; Take 1 tablet daily Recorded   5  Gabapentin 300 MG Oral Capsule; TAKE 1 CAPSULE 3 times daily Recorded   6  Ipratropium Bromide 0 06 % Nasal Solution; USE 2 SPRAYS IN EACH NOSTRIL 3   TIMES DAILY; Therapy: (Recorded:70Ctf3384) to Recorded   7  Lovenox 40 MG/0 4ML Subcutaneous Solution Recorded   8  Mag64 535 (64 Mg) MG Oral Tablet Extended Release; TAKE 2 TABLET Daily Recorded   9  Methocarbamol 500 MG Oral Tablet; 1/2 BID Recorded   10  Metoprolol Tartrate 25 MG Oral Tablet; Take 1 tablet twice daily Recorded   11  Multivitamins Oral Capsule; Therapy: (Yokasta Calles) to Recorded   12  Pantoprazole Sodium 40 MG Oral Tablet Delayed Release; Take 1 tablet daily Recorded   13  PredniSONE 5 MG Oral Tablet; Take 1 tablet daily Recorded   14  Reglan 10 MG Oral Tablet; 1/2 q8hrs Recorded   15  Tacrolimus 5 MG Oral Capsule; 1 BID Recorded   16  TraMADol HCl - 50 MG Oral Tablet; Take 1 tablet every 6 hours as needed for pain    Recorded    Allergies    1   Bactrim TABS    Signatures   Electronically signed by : Ana Luisa Donnelly DO; Oct 27 2016  1:17PM EST                       (Author)

## 2018-01-15 RX ORDER — SODIUM CHLORIDE 9 MG/ML
20 INJECTION, SOLUTION INTRAVENOUS CONTINUOUS
Status: DISCONTINUED | OUTPATIENT
Start: 2018-01-16 | End: 2018-01-19 | Stop reason: HOSPADM

## 2018-01-15 NOTE — PROGRESS NOTES
History of Present Illness  Care Coordination Encounter Information:   Type of Encounter: Telephonic   Contact: Initial Contact    Spoke to Patient   Spoke with patient and  to explain that she would need to see hematology and oncology here and they can coordinate care and medication of Candace Garg with her [facility] doctors  I gave them the phone number for [facility]  This would allow her to be able to stay close to home for this injection  Active Problems    1  Abnormal findings on diagnostic imaging of other specified body structures (793 99)   (R93 8)   2  Abnormal liver function test (790 6) (R79 89)   3  Acute bronchitis (466 0) (J20 9)   4  Acute Interstitial Lung Disease (136 3)   5  Acute renal failure (584 9) (N17 9)   6  Acute upper respiratory infection (465 9) (J06 9)   7  Anemia (285 9) (D64 9)   8  Anxiety (300 00) (F41 9)   9  Arthritis (716 90) (M19 90)   10  Cervical pain (neck) (723 1) (M54 2)   11  Cervical smear, as part of routine gynecological examination (V76 2) (Z01 419)   12  Cough (786 2) (R05)   13  Depression (311) (F32 9)   14  Encounter for monitoring cardiotoxic drug therapy (V58 83,V58 69) (Z51 81,Z79 899)   15  Encounter for routine gynecological examination (V72 31) (Z01 419)   16  Hypercholesterolemia (272 0) (E78 00)   17  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)   18  Idiopathic pulmonary fibrosis (516 31) (J84 112)   19  Lumbar canal stenosis (724 02) (M48 06)   20  Muscle weakness (728 87) (M62 81)   21  Nonspecific abnormal toxicological finding (796 0) (R89 2)   22  Right calf pain (729 5) (M79 661)   23  Screening for genitourinary condition (V81 6) (Z13 89)   24  Screening for neurological condition (V80 09) (Z13 89)   25  Segmental and somatic dysfunction of sacral region (739 4) (M99 04)   26  Skin rash (782 1) (R21)   27  Sudden visual loss (368 11) (H53 139)   28  Trapezius muscle spasm (728 85) (M62 838)   29   Urinary tract infection (599 0) (N39 0)   30  Visit for gynecologic examination (V72 31) (Z01 419)   31  Visit for pre-operative examination (V72 84) (V39 262)    Surgical History    1  History of Appendectomy   2  History of Arthroscopy Knee   3  History of Tonsillectomy    Family History  Mother    1  Family history of Hypertension (V17 49)   2  Family history of Malignant Melanoma Of The Skin (V16 8)  Father    3  Family history of Chronic Obstructive Pulmonary Disease   4  Family history of Essential Hypertension   5  Family history of Lung Cancer (V16 1)  Sister    6  Family history of Systemic Lupus Erythematosus    Social History    · Denied: History of Alcohol Use (History)   · Denied: Alcohol Use (History)   · Daily Coffee Consumption (___ Cups/Day)   · Daily Cola Consumption (___ Cans/Day)   · Daily Tea Consumption (___ Cups/Day)   · Denied: Drug Use (305 90)   · Marital History - Currently    · Never A Smoker    Current Meds    1  Ipratropium Bromide 0 06 % Nasal Solution; USE 2 SPRAYS IN EACH NOSTRIL 3 TIMES   DAILY; Therapy: (Recorded:39Eil0159) to Recorded    2  Multivitamins Oral Capsule; Therapy: (Recorded:29Jan2014) to Recorded    3  Ciprofloxacin HCl - 250 MG Oral Tablet (Cipro); TAKE 1 TABLET EVERY 12 HOURS   DAILY; Therapy: 33HZF9331 to (Evaluate:15Jun2017)  Requested for: 35MAP6454; Last   Rx:05Jun2017 Ordered    4  Atovaquone 750 MG/5ML Oral Suspension; 1 tsp q 12 hrs Recorded   5  B Complex Oral Tablet; Take 1 tablet daily Recorded   6  Citalopram Hydrobromide 10 MG Oral Tablet; Take 1 tablet daily Recorded   7  [employer] [facility] 8 6 MG Oral Tablet; Take as directed Recorded   8  Forteo 600 MCG/2 4ML Subcutaneous Solution; INJECT SUBCUTANEOUSLY AS   DIRECTED Recorded   9  Gabapentin 300 MG Oral Capsule; TAKE 1 CAPSULE 3 times daily Recorded   10  Mag64 535 (64 Mg) MG Oral Tablet Extended Release; TAKE 2 TABLET Daily Recorded   11  Methocarbamol 500 MG Oral Tablet; 1/2 BID Recorded   12   Metoprolol Tartrate 25 MG Oral Tablet; Take 1 tablet twice daily Recorded   13  Pantoprazole Sodium 40 MG Oral Tablet Delayed Release; Take 1 tablet daily Recorded   14  Pravastatin Sodium 40 MG Oral Tablet; Take 1 tablet daily Recorded   15  PredniSONE 5 MG Oral Tablet; Take 1 tablet daily Recorded   16  Restasis 0 05 % Ophthalmic Emulsion; USE AS DIRECTED Recorded   17  Tacrolimus 5 MG Oral Capsule; 1 BID Recorded   18  TraMADol HCl - 50 MG Oral Tablet; Take 1 tablet every 6 hours as needed for pain    Recorded    Allergies    1  Bactrim TABS    End of Encounter Meds    1  Ipratropium Bromide 0 06 % Nasal Solution; USE 2 SPRAYS IN EACH NOSTRIL 3 TIMES   DAILY; Therapy: (Recorded:18Rrd7923) to Recorded    2  Multivitamins Oral Capsule; Therapy: (Recorded:29Jan2014) to Recorded    3  Ciprofloxacin HCl - 250 MG Oral Tablet (Cipro); TAKE 1 TABLET EVERY 12 HOURS   DAILY; Therapy: 57WUJ9410 to (Evaluate:15Jun2017)  Requested for: 31NTX4521; Last   Rx:05Jun2017 Ordered    4  Atovaquone 750 MG/5ML Oral Suspension; 1 tsp q 12 hrs Recorded   5  B Complex Oral Tablet; Take 1 tablet daily Recorded   6  Citalopram Hydrobromide 10 MG Oral Tablet; Take 1 tablet daily Recorded   7  [employer] Sen[employer] 8 6 MG Oral Tablet; Take as directed Recorded   8  Forteo 600 MCG/2 4ML Subcutaneous Solution; INJECT SUBCUTANEOUSLY AS   DIRECTED Recorded   9  Gabapentin 300 MG Oral Capsule; TAKE 1 CAPSULE 3 times daily Recorded   10  Mag64 535 (64 Mg) MG Oral Tablet Extended Release; TAKE 2 TABLET Daily Recorded   11  Methocarbamol 500 MG Oral Tablet; 1/2 BID Recorded   12  Metoprolol Tartrate 25 MG Oral Tablet; Take 1 tablet twice daily Recorded   13  Pantoprazole Sodium 40 MG Oral Tablet Delayed Release; Take 1 tablet daily Recorded   14  Pravastatin Sodium 40 MG Oral Tablet; Take 1 tablet daily Recorded   15  PredniSONE 5 MG Oral Tablet; Take 1 tablet daily Recorded   16  Restasis 0 05 % Ophthalmic Emulsion; USE AS DIRECTED Recorded   17   Tacrolimus 5 MG Oral Capsule; 1 BID Recorded   18  TraMADol HCl - 50 MG Oral Tablet;  Take 1 tablet every 6 hours as needed for pain    Recorded    Patient Care Team    Care Team Member Role Specialty Office Number   Garland Marr MD Specialist Anesthesia [phone]   Quique Teixeira MD Specialist Pulmonary Medicine [phone]   Don Chung MD Specialist Thoracic Surgery [phone]   Chester Paniagua MD Specialist Ophthalmology [phone]   Liyah Mathews MD Specialist Ophthalmology [phone]   Alfa Rosas MD  Pulmonary Medicine [phone]     Signatures   Electronically signed by : Sagrario Weinberg RN; Sep 26 2017  3:20PM EST                       (Author)

## 2018-01-16 ENCOUNTER — HOSPITAL ENCOUNTER (OUTPATIENT)
Dept: INFUSION CENTER | Facility: CLINIC | Age: 68
Discharge: HOME | End: 2018-01-16
Payer: MEDICARE

## 2018-01-16 VITALS
RESPIRATION RATE: 20 BRPM | DIASTOLIC BLOOD PRESSURE: 66 MMHG | SYSTOLIC BLOOD PRESSURE: 141 MMHG | TEMPERATURE: 97.8 F | HEART RATE: 83 BPM

## 2018-01-16 PROCEDURE — 96365 THER/PROPH/DIAG IV INF INIT: CPT

## 2018-01-16 PROCEDURE — 96366 THER/PROPH/DIAG IV INF ADDON: CPT

## 2018-01-16 RX ADMIN — SODIUM CHLORIDE 20 ML/HR: 0.9 INJECTION, SOLUTION INTRAVENOUS at 10:40

## 2018-01-16 RX ADMIN — IRON SUCROSE 300 MG: 20 INJECTION, SOLUTION INTRAVENOUS at 10:43

## 2018-01-16 NOTE — MISCELLANEOUS
Assessment    1  Cervical pain (neck) (723 1) (M54 2)   2  Trapezius muscle spasm (728 85) (M62 838)   3  Screening for neurological condition (V80 09) (Z13 89)   4  Screening for genitourinary condition (V81 6) (Z13 89)    Plan  Anxiety    · ALPRAZolam 0 25 MG Oral Tablet   Rx By: Jose Sandoval; Dispense: 30 Days ; #:90 Tablet; Refill: 1; For: Anxiety; IRA = N; Call Rx; Last Updated By: Noman Keith; 6/1/2016 9:54:23 AM  Cervical pain (neck), Trapezius muscle spasm    · *1 - [facility] Physical Therapy  Consult  Status: Active  Requested for:  38EAD3163   Ordered; For: Cervical pain (neck), Trapezius muscle spasm; Ordered By: Jose Sandoval Performed:  Due: 04YBY1986; Last Updated By: Cindy Luciano; 6/2/2016 7:47:21 AM  Care Summary provided  : Yes  Screening for genitourinary condition    · *VB-Urinary Incontinence Screen (Dx V81 6 Screen for UI); Status:Complete;   Done:  42VXZ2364 12:00AM   Performed: In Office; TLJ:[id number]; Last Updated By:Malena Rose; 6/1/2016 11:52:22 AM;Ordered; For:Screening for genitourinary condition; Ordered By:Anu Maher;  Screening for neurological condition    · *VB - Fall Risk Assessment  (Dx V80 09 Screen for Neurologic Disorder);  Status:Complete;   Done: 03UAU1071 12:00AM   Performed: In Office; [id number]; Last Updated By:Malena Rose; 6/1/2016 11:52:22 AM;Ordered; For:Screening for neurological condition; Ordered By:Roxanna Maher; Discussion/Summary  Discussion Summary:   Will await the patient's progress with physical therapy and I'll see her back in 6 weeks' time  If the pain worsens in the interim she is to call  History of Present Illness  TCM Communication [facility]: The patient is being contacted for follow-up after hospitalization and APPOINT SCHEDULED FOR 6-1-16 AT 9:45 WITH DR Juno Santa  She was hospitalized at and [address]  The dates of hospitalization:, date of admission: 5-20-16, date of discharge: 5-21-16  Diagnosis: SHOULDER AND UPPER BACK PAIN  She was discharged to home  Medications reviewed and updated today  She scheduled a follow up appointment  Follow-up appointments with other specialists: DR Brennon Dominguez 2 WEEKS  The patient is currently asymptomatic  Counseling was provided to the patient  Communication performed and completed by BERTIN CHAUHAN LPN   HPI: The patient is here status post hospitalization for right upper back/thoracic pain status post lung transplant  She ruled out for other types of causes of the etiology at the lung pain it was determined that it was secondary to musculoskeletal etiology  Review of Systems  Complete-Female:   Constitutional: No fever, no chills, feels well, no tiredness, no recent weight gain or weight loss  Eyes: No complaints of eye pain, no red eyes, no eyesight problems, no discharge, no dry eyes, no itching of eyes  ENT: no complaints of earache, no loss of hearing, no nose bleeds, no nasal discharge, no sore throat, no hoarseness  Cardiovascular: No complaints of slow heart rate, no fast heart rate, no chest pain, no palpitations, no leg claudication, no lower extremity edema  Respiratory: No complaints of shortness of breath, no wheezing, no cough, no SOB on exertion, no orthopnea, no PND  Gastrointestinal: No complaints of abdominal pain, no constipation, no nausea or vomiting, no diarrhea, no bloody stools  Genitourinary: No complaints of dysuria, no incontinence, no pelvic pain, no dysmenorrhea, no vaginal discharge or bleeding  Musculoskeletal: arthralgias and Primary upper shoulder back pain in the thoracic area status post lung transplant, but No complaints of arthralgias, no myalgias, no joint swelling or stiffness, no limb pain or swelling  Integumentary: No complaints of skin rash or lesions, no itching, no skin wounds, no breast pain or lump     Neurological: No complaints of headache, no confusion, no convulsions, no numbness, no dizziness or fainting, no tingling, no limb weakness, no difficulty walking  Psychiatric: Not suicidal, no sleep disturbance, no anxiety or depression, no change in personality, no emotional problems  Endocrine: No complaints of proptosis, no hot flashes, no muscle weakness, no deepening of the voice, no feelings of weakness  Hematologic/Lymphatic: No complaints of swollen glands, no swollen glands in the neck, does not bleed easily, does not bruise easily  Preventive Quality 65 Older:   Preventive Quality 65 and Older: Falls Risk: The patient fell 0 times in the past 12 months  The patient currently has no urinary incontinence symptoms  Active Problems    1  Abnormal findings on diagnostic imaging of other specified body structures (793 99)   (R93 8)   2  Acute bronchitis (466 0) (J20 9)   3  Acute Interstitial Lung Disease (136 3)   4  Anxiety (300 00) (F41 9)   5  Arthritis (716 90) (M19 90)   6  Cervical smear, as part of routine gynecological examination (V76 2) (Z01 419)   7  Cough (786 2) (R05)   8  Depression (311) (F32 9)   9  Encounter for routine gynecological examination (V72 31) (Z01 419)   10  Hypercholesterolemia (272 0) (E78 0)   11  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)   12  Idiopathic pulmonary fibrosis (516 31) (J84 112)   13  Lumbar canal stenosis (724 02) (M48 06)   14  Muscle weakness (728 87) (M62 81)   15  Nonspecific abnormal toxicological finding (796 0) (R89 2)   16  Screening for genitourinary condition (V81 6) (Z13 89)   17  Screening for neurological condition (V80 09) (Z13 89)   18  Segmental and somatic dysfunction of sacral region (739 4) (M99 04)   19  Skin rash (782 1) (R21)   20  Sudden visual loss (368 11) (H53 139)   21  Upper respiratory infection (465 9) (J06 9)   22  Urinary tract infection (599 0) (N39 0)   23  Visit for gynecologic examination (V72 31) (Z01 419)   24  Visit for pre-operative examination (V72 84) (T40 397)    Surgical History    1   History of Appendectomy   2  History of Arthroscopy Knee   3  History of Tonsillectomy    Family History  Mother    1  Family history of Hypertension (V17 49)   2  Family history of Malignant Melanoma Of The Skin (V16 8)  Father    3  Family history of Chronic Obstructive Pulmonary Disease   4  Family history of Essential Hypertension   5  Family history of Lung Cancer (V16 1)  Sister    6  Family history of Systemic Lupus Erythematosus  Family History Reviewed: The family history was reviewed and updated today  Social History    · Denied: History of Alcohol Use (History)   · Denied: Alcohol Use (History)   · Daily Coffee Consumption (___ Cups/Day)   · Daily Cola Consumption (___ Cans/Day)   · Daily Tea Consumption (___ Cups/Day)   · Denied: Drug Use (305 90)   · Marital History - Currently    · Never A Smoker  Social History Reviewed: The social history was reviewed and updated today  The social history was reviewed and is unchanged  Current Meds   1  ALPRAZolam 0 25 MG Oral Tablet; TAKE 1 TABLET BY MOUTH THREE TIMES DAILY; Therapy: 50MGP5606 to (Evaluate:79Gbc8441)  Requested for: 71BCT7234; Last   Rx:51Eov2538 Ordered   2  Atorvastatin Calcium 40 MG Oral Tablet; take 1 tablet by mouth daily; Therapy: 89GBY4256 to [phone]  Requested for: 91Lqx2038; Last   Rx:21Uyo3710 Ordered   3  Atovaquone 750 MG/5ML Oral Suspension; 1 tsp q 12 hrs Recorded   4  B Complex Oral Tablet; Take 1 tablet daily Recorded   5  Calcium 600+D 600-200 MG-UNIT Oral Tablet; 2 tabs once a day; Therapy: (Ronald Primrose) to Recorded   6  Co Q-10 200 MG Oral Capsule; TAKE 1 CAPSULE Daily Recorded   7  Cranberry 500 MG Oral Capsule; 1 TAB QD Recorded   8  Fish Oil CAPS; 1 Cap daily; Therapy: (Ronald Primrose) to Recorded   9  Forteo SOLN; INJECT SUBCUTANEOUSLY AS DIRECTED Recorded   10  Gabapentin 600 MG Oral Tablet; Therapy: 65UBL0369 to (Last Rx:54Khp6304)  Requested for: 25Oct2011 Ordered   11  Ipratropium Bromide 0 06 % Nasal Solution; USE 2 SPRAYS IN EACH NOSTRIL 3 TIMES    DAILY; Therapy: (Recorded:68Mpk7239) to Recorded   12  Multivitamins Oral Capsule; Therapy: (Sunshine Price) to Recorded   13  Omeprazole 40 MG Oral Capsule Delayed Release; Therapy: 68CJC1627 to (Last Rx:25Oct2011)  Requested for: 25Oct2011 Ordered   14  PARoxetine HCl - 20 MG Oral Tablet; TAKE 1 1/2 TABLET BY MOUTH EVERY DAY; Therapy: 24DRI8628 to ([place])  Requested for: 81VBX8428; Last    Rx:37Yzw1606 Ordered   15  PredniSONE 10 MG Oral Tablet; TAKE 1 TABLET 3 TIMES DAILY; Therapy: (Recorded:08Cje1213) to Recorded   16  Vitamin C 500 MG Oral Capsule; TAKE 1 CAPSULE Daily; Therapy: (Recorded:29Jan2014) to Recorded  Medication List Reviewed: The medication list was reviewed and updated today  Allergies    1  Bactrim TABS    Vitals  Signs [Data Includes: Current Encounter]   Recorded: [id number] 09:53AM   Temperature: 97 6 F  Heart Rate: 82  Respiration: 14  Systolic: 952  Diastolic: 60  Height: 5 ft 7 in  Weight: 127 lb   BMI Calculated: 19 89  BSA Calculated: 1 67    Physical Exam    Constitutional   General appearance: No acute distress, well appearing and well nourished  Head and Face   Head and face: Normal     Palpation of the face and sinuses: No sinus tenderness  Eyes   Conjunctiva and lids: No swelling, erythema or discharge  Pupils and irises: Equal, round, reactive to light  Ophthalmoscopic examination: Normal fundi and optic discs  Ears, Nose, Mouth, and Throat   External inspection of ears and nose: Normal     Otoscopic examination: Tympanic membranes translucent with normal light reflex  Canals patent without erythema  Hearing: Normal     Nasal mucosa, septum, and turbinates: Normal without edema or erythema  Lips, teeth, and gums: Normal, good dentition  Oropharynx: Normal with no erythema, edema, exudate or lesions      Neck   Neck: Supple, symmetric, trachea midline, no masses  Thyroid: Normal, no thyromegaly  Pulmonary   Respiratory effort: No increased work of breathing or signs of respiratory distress  Auscultation of lungs: Clear to auscultation  Cardiovascular   Auscultation of heart: Normal rate and rhythm, normal S1 and S2, no murmurs  Carotid pulses: 2+ bilaterally  Pedal pulses: 2+ bilaterally  Peripheral vascular exam: Normal     Examination of extremities for edema and/or varicosities: Normal     Abdomen   Abdomen: Non-tender, no masses  Liver and spleen: No hepatomegaly or splenomegaly  Lymphatic   Palpation of lymph nodes in neck: No lymphadenopathy  Musculoskeletal   Digits and nails: Normal without clubbing or cyanosis  Joints, bones, and muscles: Abnormal   Increased paravertebral muscle tension of the upper parathoracic musculature on the right and in the right trapezius area on palpation  Skin   Skin and subcutaneous tissue: Normal without rashes or lesions  Palpation of skin and subcutaneous tissue: Normal turgor  Neurologic   Reflexes: 2+ and symmetric  Psychiatric   Judgment and insight: Normal     Orientation to person, place, and time: Normal     Recent and remote memory: Intact      Mood and affect: Normal        Results/Data  Encounter Results   *VB - Fall Risk Assessment  (Dx V80 09 Screen for Neurologic Disorder) 47PJQ4270 12:00AM Laura Dagoberto     Test Name Result Flag Reference   Fall Risk Assessment [id number]       *VB-Urinary Incontinence Screen (Dx V81 6 Screen for UI) 04GHD4551 12:00AM Laura Dagoberto     Test Name Result Flag Reference   Urinary Incontinence Assessment [id number]         Signatures   Electronically signed by : Paco Frias DO; Jun 5 2016  4:22PM EST                       (Author)

## 2018-01-16 NOTE — CONSULTS
Chief Complaint  " I have anemia "      History of Present Illness  Pt is a pleasant 43-year-old female, with history of idiopathic pulmonary fibrosis, status post bilateral lung transplant in [facility] in [place], history of right femur fracture status post ORIF and blood transfusion due to anemia, possible CKD, currently on tacrolimus and CellCept for immunosuppression due to lung transplant, following a hematologist in [facility] for possible medication related anemia and leukocytopenia, referred to local hematology for comanagement  Patient came in today with her , reported feeling positional lightheadedness and exertional fatigue for at least 6 months, possibly year  She otherwise reported no new weight loss, acute energy level change, fever, night sweat, lumps bumps, bleeding  She reported no change of her skin color, no hemoptysis, normal color of her urine and stool  She has been compliant with her tacrolimus and CellCept  She received multiple blood transfusions, last transfusion last summer after hip surgery  She is a nonsmoker, no drinking ETOH, lives with her , no prior hematology, oncology issues before lung transplant  She will continue to follow her hematologist in [place]  1, anemia: July 7, 2016, status post bone marrow biopsy to evaluate pancytopenia, result is not revealing, possible early MDS  Received a blood transfusion during surgery, last transfusion June of 2016   2, leukocytopenia, neutropenia: Received Neupogen, Neulasta, developed significant side-effect , including bone pain  Review of Systems    Constitutional: feeling poorly and feeling tired, but no fever, no recent weight gain, no chills and no recent weight loss  Eyes: No complaints of eye pain, no red eyes, no eyesight problems, no discharge, no dry eyes, no itching of eyes     ENT: no complaints of earache, no loss of hearing, no nose bleeds, no nasal discharge, no sore throat, no hoarseness  Cardiovascular: No complaints of slow heart rate, no fast heart rate, no chest pain, no palpitations, no leg claudication, no lower extremity edema  Respiratory: No complaints of shortness of breath, no wheezing, no cough, no SOB on exertion, no orthopnea, no PND  Gastrointestinal: No complaints of abdominal pain, no constipation, no nausea or vomiting, no diarrhea, no bloody stools  Genitourinary: No complaints of dysuria, no incontinence, no pelvic pain, no dysmenorrhea, no vaginal discharge or bleeding  Musculoskeletal: No complaints of arthralgias, no myalgias, no joint swelling or stiffness, no limb pain or swelling  Integumentary: No complaints of skin rash or lesions, no itching, no skin wounds, no breast pain or lump  Neurological: No complaints of headache, no confusion, no convulsions, no numbness, no dizziness or fainting, no tingling, no limb weakness, no difficulty walking  Psychiatric: Not suicidal, no sleep disturbance, no anxiety or depression, no change in personality, no emotional problems  Endocrine: No complaints of proptosis, no hot flashes, no muscle weakness, no deepening of the voice, no feelings of weakness  Hematologic/Lymphatic: No complaints of swollen glands, no swollen glands in the neck, does not bleed easily, does not bruise easily  Active Problems    1  Abnormal findings on diagnostic imaging of other specified body structures (793 99)   (R93 8)   2  Abnormal liver function test (790 6) (R79 89)   3  Acute bronchitis (466 0) (J20 9)   4  Acute Interstitial Lung Disease (136 3)   5  Acute renal failure (584 9) (N17 9)   6  Acute upper respiratory infection (465 9) (J06 9)   7  Anemia (285 9) (D64 9)   8  Anxiety (300 00) (F41 9)   9  Arthritis (716 90) (M19 90)   10  Cervical pain (neck) (723 1) (M54 2)   11  Cervical smear, as part of routine gynecological examination (V76 2) (Z01 419)   12  Cough (786 2) (R05)   13  Depression (311) (F32 9)   14  Encounter for monitoring cardiotoxic drug therapy (V58 83,V58 69) (Z51 81,Z79 899)   15  Encounter for routine gynecological examination (V72 31) (Z01 419)   16  Hypercholesterolemia (272 0) (E78 00)   17  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)   18  Idiopathic pulmonary fibrosis (516 31) (J84 112)   19  Lumbar canal stenosis (724 02) (M48 061)   20  Muscle weakness (728 87) (M62 81)   21  Nonspecific abnormal toxicological finding (796 0) (R89 2)   22  Right calf pain (729 5) (M79 661)   23  Screening for genitourinary condition (V81 6) (Z13 89)   24  Screening for neurological condition (V80 09) (Z13 89)   25  Segmental and somatic dysfunction of sacral region (739 4) (M99 04)   26  Skin rash (782 1) (R21)   27  Sudden visual loss (368 11) (H53 139)   28  Trapezius muscle spasm (728 85) (M62 838)   29  Urinary tract infection (599 0) (N39 0)   30  Visit for gynecologic examination (V72 31) (Z01 419)   31  Visit for pre-operative examination (V72 84) (U82 646)    Past Medical History    The active problems and past medical history were reviewed and updated today  Surgical History    · History of Appendectomy   · History of Arthroscopy Knee   · History of Tonsillectomy    The surgical history was reviewed and updated today  Family History    · Family history of Hypertension (V17 49)   · Family history of Malignant Melanoma Of The Skin (V16 8)    · Family history of Chronic Obstructive Pulmonary Disease   · Family history of Essential Hypertension   · Family history of Lung Cancer (V16 1)    · Family history of Systemic Lupus Erythematosus    The family history was reviewed and updated today         Social History    · Denied: History of Alcohol Use (History)   · Denied: Alcohol Use (History)   · Daily Coffee Consumption (___ Cups/Day)   · Daily Cola Consumption (___ Cans/Day)   · Daily Tea Consumption (___ Cups/Day)   · Denied: Drug Use (305 90)   · Marital History - Currently    · Never A Smoker  The social history was reviewed and updated today  Current Meds   1  Atovaquone 750 MG/5ML Oral Suspension; 1 tsp q 12 hrs Recorded   2  B Complex Oral Tablet; Take 1 tablet daily Recorded   3  Calcium 600 + D TABS; Therapy: [phone]) to Recorded   4  Ciprofloxacin HCl - 250 MG Oral Tablet; TAKE 1 TABLET EVERY 12 HOURS DAILY; Therapy: 96DPI5324 to (Evaluate:15Jun2017)  Requested for: 75CIX5810; Last   Rx:05Jun2017 Ordered   5  Citalopram Hydrobromide 10 MG Oral Tablet; Take 1 tablet daily Recorded   6  [employer] Senna 8 6 MG Oral Tablet; Take as directed Recorded   7  Docusate Sodium 100 MG Oral Capsule; Therapy: [phone]) to Recorded   8  Forteo 600 MCG/2 4ML Subcutaneous Solution; INJECT SUBCUTANEOUSLY AS   DIRECTED Recorded   9  Gabapentin 300 MG Oral Capsule; TAKE 1 CAPSULE 3 times daily Recorded   10  Gabapentin 300 MG TABS; Therapy: (Recorded:03Oct2017) to Recorded   11  Ipratropium Bromide 0 06 % Nasal Solution; USE 2 SPRAYS IN EACH NOSTRIL 3 TIMES    DAILY; Therapy: (Recorded:41Jdk2102) to Recorded   12  Mag64 535 (64 Mg) MG Oral Tablet Extended Release; TAKE 2 TABLET Daily Recorded   13  Metoprolol Tartrate 25 MG Oral Tablet; Therapy: [phone]) to Recorded   14  Multivitamins Oral Capsule; Therapy: (53-69-10-18) to Recorded   15  Mycophenolate Mofetil 250 MG Oral Capsule; Therapy: (([phone]) to Recorded   16  Pantoprazole Sodium 40 MG Oral Tablet Delayed Release; Take 1 tablet daily Recorded   17  Pravastatin Sodium 40 MG Oral Tablet; Take 1 tablet daily Recorded   18  PredniSONE 5 MG Oral Tablet; Take 1 tablet daily Recorded   19  Restasis 0 05 % Ophthalmic Emulsion; USE AS DIRECTED Recorded   20  Tacrolimus CAPS; Therapy: [phone]) to Recorded   21  TraMADol HCl - 50 MG Oral Tablet; Take 1 tablet every 6 hours as needed for pain    Recorded    Allergies    1   Bactrim TABS    Vitals   Recorded: 59CTW3981 01:16PM   Temperature 97 4 F   Heart Rate 107   Respiration 16   Systolic 841   Diastolic 84   Height 5 ft 7 in   Weight 160 lb 5 oz   BMI Calculated 25 11   BSA Calculated 1 84   O2 Saturation 97   Pain Scale 0     Physical Exam    Constitutional   General appearance: No acute distress, well appearing and well nourished  Eyes   Conjunctiva and lids: No swelling, erythema or discharge  Ears, Nose, Mouth, and Throat   External inspection of ears and nose: Normal     Pulmonary   Respiratory effort: No increased work of breathing or signs of respiratory distress  Auscultation of lungs: Abnormal   decreased breathing sounds  Cardiovascular   Palpation of heart: Normal PMI, no thrills  Auscultation of heart: Normal rate and rhythm, normal S1 and S2, without murmurs  Examination of extremities for edema and/or varicosities: Normal     Carotid pulses: Normal     Abdomen   Abdomen: Non-tender, no masses  Liver and spleen: No hepatomegaly or splenomegaly  Lymphatic   Palpation of lymph nodes in neck: No lymphadenopathy  Musculoskeletal   Gait and station: Normal     Digits and nails: Normal without clubbing or cyanosis  Inspection/palpation of joints, bones, and muscles: Normal     Skin   Skin and subcutaneous tissue: Normal without rashes or lesions  Psychiatric   Orientation to person, place, and time: Normal     Mood and affect: Normal         ECOG 2       Assessment    1  Anemia (285 9) (D64 9)    Plan  Anemia    · (1) CBC/ PLT (NO DIFF); Status:Active; Requested WVI:[id number];    Perform:[facility]; PPC:96OZR1779; Ordered; Layton Martinez; Ordered By:Lalit Abdalla;   · (1) ERYTHROPOIETIN; Status:Active; Requested OXN:[id number];    Perform:[facility]; OSCAR:[id number]; Ordered; Layton ; Ordered By:Lalit Abdalla;   · (1) HAPTOGLOBIN; Status:Active; Requested TET:[id number];    Perform:[facility]; NLR:[id number]; Ordered; Layton ;  Ordered By:Lalit Abdalla;   · (1) IRON PANEL; Status:Active; Requested [id number];    Perform:[facility]; KAD:[id number]; Ordered; Wendyearonni Blunt; Ordered By:Jamila Abdalla;   · (1) LD (LDH); Status:Active; Requested FKI:[id number];    Perform:[facility]; ESR:[id number]; Ordered; Wendyearonni Blunt; Ordered By:Jamila Abadlla;   · (1) RETICULOCYTE COUNT; Status:Active; Requested BKZ:[id number];    Perform:[facility]; KLI:[id number]; Ordered; Rejeana Jose Raul; Ordered By:Jamila Abdalla;   · (1) TYPE & SCREEN; Status:Active; Requested XYN:[id number];    Perform:[facility]; [id number]; Ordered; Wendyearonni Blunt; Ordered By:Jamila Abdalla;  currently receiving a biologic? : Yes  the patient pregnant or have they been in the last 90 days? : Yes  the patient been transfused in the last 3 months? : Yes  or PreOp : Medical   · (1) VITAMIN B12; Status:Active; Requested PTE:[id number];    Perform:[facility]; BFH:[id number]; Ordered; Renard Blunt; Ordered By:Jamila Abdalla;   · Follow-up visit in 1 week Evaluation and Treatment  Follow-up  Status: Complete  Done:  63FWV2135   Ordered; For: Anemia; Ordered By: Rafa Honeycutt Performed:  Order Comments: blood work today please shcedule blood transfusion x 1 unit next week F/U in one week with Dr Anupama Moody, then plan for transfusion afterwards  Due: 00KJV6518; Last Updated By: Ryanne Shahid; 10/3/2017 2:02:39 PM    Discussion/Summary    Anemia : multifactorial, likely drug - related and CKD  no blood loss or hemolysis  However tacrolimus has been reported to cause of hemolysis, need to rule out    --check CBC, CMP, haptoglobin, LDH, iron panel, B12 level, retic count, erythropoietin level  --given she is having symptoms, favoring blood transfusion, depending on her hemoglobin level  Will also give Aranesp if her erythropoietin level is low, which could due to CKD    --depending on her level decided whether she should take iron or no  --follow-up in 1 week        Signatures   Electronically signed by : Vin Guevara MD; Oct  3 2017  4:31PM EST (Author)

## 2018-01-16 NOTE — PLAN OF CARE
Problem: Potential for Falls  Goal: Patient will remain free of falls  INTERVENTIONS:  - Assess patient frequently for physical needs  -  Identify cognitive and physical deficits and behaviors that affect risk of falls    -  [place] fall precautions as indicated by assessment   - Educate patient/family on patient safety including physical limitations  - Instruct patient to call for assistance with activity based on assessment  - Modify environment to reduce risk of injury  - Consider OT/PT consult to assist with strengthening/mobility   Outcome: Progressing      Problem: PAIN - ADULT  Goal: Verbalizes/displays adequate comfort level or baseline comfort level  Interventions:  - Encourage patient to monitor pain and request assistance  - Assess pain using appropriate pain scale  - Administer analgesics based on type and severity of pain and evaluate response  - Implement non-pharmacological measures as appropriate and evaluate response  - Consider cultural and social influences on pain and pain management  - Notify physician/advanced practitioner if interventions unsuccessful or patient reports new pain  Outcome: Progressing      Problem: INFECTION - ADULT  Goal: Absence or prevention of progression during hospitalization  INTERVENTIONS:  - Assess and monitor for signs and symptoms of infection  - Monitor lab/diagnostic results  - Monitor all insertion sites, i e  indwelling lines, tubes, and drains  - Monitor endotracheal (as able) and nasal secretions for changes in amount and color  - [place] appropriate cooling/warming therapies per order  - Administer medications as ordered  - Instruct and encourage patient and family to use good hand hygiene technique  - Identify and instruct in appropriate isolation precautions for identified infection/condition  Outcome: Progressing

## 2018-01-16 NOTE — PROGRESS NOTES
Patient tolerated venofer without incident and discharged, next dosing 1/23/18  She offers no c/o  AVS given

## 2018-01-19 RX ORDER — SODIUM CHLORIDE 9 MG/ML
20 INJECTION, SOLUTION INTRAVENOUS CONTINUOUS
Status: DISCONTINUED | OUTPATIENT
Start: 2018-01-23 | End: 2018-01-26 | Stop reason: HOSPADM

## 2018-01-22 VITALS
OXYGEN SATURATION: 94 % | RESPIRATION RATE: 16 BRPM | SYSTOLIC BLOOD PRESSURE: 124 MMHG | DIASTOLIC BLOOD PRESSURE: 74 MMHG | HEART RATE: 99 BPM | TEMPERATURE: 98 F | BODY MASS INDEX: 25.94 KG/M2 | HEIGHT: 67 IN | WEIGHT: 165.31 LBS

## 2018-01-22 VITALS
HEIGHT: 67 IN | DIASTOLIC BLOOD PRESSURE: 80 MMHG | TEMPERATURE: 98.9 F | BODY MASS INDEX: 25.63 KG/M2 | OXYGEN SATURATION: 93 % | SYSTOLIC BLOOD PRESSURE: 130 MMHG | WEIGHT: 163.31 LBS | RESPIRATION RATE: 16 BRPM | HEART RATE: 103 BPM

## 2018-01-23 ENCOUNTER — HOSPITAL ENCOUNTER (OUTPATIENT)
Dept: INFUSION CENTER | Facility: CLINIC | Age: 68
Discharge: HOME | End: 2018-01-23
Payer: MEDICARE

## 2018-01-23 VITALS
OXYGEN SATURATION: 97 % | SYSTOLIC BLOOD PRESSURE: 111 MMHG | DIASTOLIC BLOOD PRESSURE: 53 MMHG | RESPIRATION RATE: 16 BRPM | HEART RATE: 102 BPM | TEMPERATURE: 98.3 F

## 2018-01-23 PROCEDURE — 96365 THER/PROPH/DIAG IV INF INIT: CPT

## 2018-01-23 RX ADMIN — IRON SUCROSE 300 MG: 20 INJECTION, SOLUTION INTRAVENOUS at 13:50

## 2018-01-23 RX ADMIN — SODIUM CHLORIDE 20 ML/HR: 9 INJECTION, SOLUTION INTRAVENOUS at 13:35

## 2018-01-23 NOTE — PROGRESS NOTES
Tolerated infusion without incident: No adverse reactions noted: Verified follow up appt with patient: AVS printed per request

## 2018-01-23 NOTE — PROGRESS NOTES
Patient to Hugo Edmonds: Offers no complaints at present time: Left FA PIV initiated without incident

## 2018-01-30 RX ORDER — SODIUM CHLORIDE 9 MG/ML
20 INJECTION, SOLUTION INTRAVENOUS CONTINUOUS
Status: DISCONTINUED | OUTPATIENT
Start: 2018-01-31 | End: 2018-02-03 | Stop reason: HOSPADM

## 2018-01-31 ENCOUNTER — HOSPITAL ENCOUNTER (OUTPATIENT)
Dept: INFUSION CENTER | Facility: CLINIC | Age: 68
Discharge: HOME | End: 2018-01-31
Payer: MEDICARE

## 2018-01-31 VITALS
RESPIRATION RATE: 18 BRPM | TEMPERATURE: 98.3 F | HEART RATE: 80 BPM | SYSTOLIC BLOOD PRESSURE: 110 MMHG | DIASTOLIC BLOOD PRESSURE: 63 MMHG | OXYGEN SATURATION: 100 %

## 2018-01-31 PROCEDURE — 96366 THER/PROPH/DIAG IV INF ADDON: CPT

## 2018-01-31 PROCEDURE — 96365 THER/PROPH/DIAG IV INF INIT: CPT

## 2018-01-31 RX ADMIN — IRON SUCROSE 300 MG: 20 INJECTION, SOLUTION INTRAVENOUS at 12:44

## 2018-01-31 RX ADMIN — SODIUM CHLORIDE 20 ML/HR: 0.9 INJECTION, SOLUTION INTRAVENOUS at 12:39

## 2018-02-06 ENCOUNTER — HOSPITAL ENCOUNTER (OUTPATIENT)
Dept: INFUSION CENTER | Facility: CLINIC | Age: 68
Discharge: HOME | End: 2018-02-06
Payer: MEDICARE

## 2018-02-06 VITALS
OXYGEN SATURATION: 99 % | DIASTOLIC BLOOD PRESSURE: 56 MMHG | TEMPERATURE: 98.5 F | RESPIRATION RATE: 16 BRPM | SYSTOLIC BLOOD PRESSURE: 119 MMHG | HEART RATE: 86 BPM

## 2018-02-06 PROCEDURE — 96372 THER/PROPH/DIAG INJ SC/IM: CPT

## 2018-02-06 RX ADMIN — DARBEPOETIN ALFA 100 MCG: 100 INJECTION, SOLUTION INTRAVENOUS; SUBCUTANEOUS at 10:40

## 2018-02-06 NOTE — PROGRESS NOTES
Patient to Hugo Santana: Offers no complaints at present time: Injection given in [place] without incident: No adverse reactions noted: Verified follow up appt with patient: AVS given to patient per request

## 2018-02-13 ENCOUNTER — HOSPITAL ENCOUNTER (OUTPATIENT)
Dept: INFUSION CENTER | Facility: CLINIC | Age: 68
Discharge: HOME | End: 2018-02-13
Payer: MEDICARE

## 2018-02-13 VITALS
HEART RATE: 82 BPM | RESPIRATION RATE: 22 BRPM | SYSTOLIC BLOOD PRESSURE: 118 MMHG | TEMPERATURE: 98.1 F | DIASTOLIC BLOOD PRESSURE: 60 MMHG

## 2018-02-13 PROCEDURE — 96372 THER/PROPH/DIAG INJ SC/IM: CPT

## 2018-02-13 RX ADMIN — DARBEPOETIN ALFA 100 MCG: 100 INJECTION, SOLUTION INTRAVENOUS; SUBCUTANEOUS at 11:14

## 2018-02-13 NOTE — PROGRESS NOTES
Pt offers no complaints today  Noted HGB 8 3, 1/29/2018  aranesp 100mcg given in left arm  Band aid applied  Printed AVS and reviewed

## 2018-02-13 NOTE — PLAN OF CARE
Problem: Potential for Falls  Goal: Patient will remain free of falls  INTERVENTIONS:  - Assess patient frequently for physical needs  -  Identify cognitive and physical deficits and behaviors that affect risk of falls    -  [place] fall precautions as indicated by assessment   - Educate patient/family on patient safety including physical limitations  - Instruct patient to call for assistance with activity based on assessment  - Modify environment to reduce risk of injury  - Consider OT/PT consult to assist with strengthening/mobility   Outcome: Progressing

## 2018-02-20 ENCOUNTER — HOSPITAL ENCOUNTER (OUTPATIENT)
Dept: INFUSION CENTER | Facility: CLINIC | Age: 68
Discharge: HOME | End: 2018-02-20
Payer: MEDICARE

## 2018-02-20 VITALS — SYSTOLIC BLOOD PRESSURE: 128 MMHG | DIASTOLIC BLOOD PRESSURE: 64 MMHG

## 2018-02-20 PROCEDURE — 96372 THER/PROPH/DIAG INJ SC/IM: CPT

## 2018-02-20 RX ADMIN — DARBEPOETIN ALFA 100 MCG: 100 INJECTION, SOLUTION INTRAVENOUS; SUBCUTANEOUS at 10:10

## 2018-02-20 NOTE — PROGRESS NOTES
Pt  offers no complaints  Hgb on   Aranesp admin  SQ in [place] without incident  AVS given  Next appt  confirmed

## 2018-02-27 ENCOUNTER — HOSPITAL ENCOUNTER (OUTPATIENT)
Dept: INFUSION CENTER | Facility: CLINIC | Age: 68
Discharge: HOME | End: 2018-02-27
Payer: MEDICARE

## 2018-02-27 VITALS — DIASTOLIC BLOOD PRESSURE: 56 MMHG | SYSTOLIC BLOOD PRESSURE: 114 MMHG

## 2018-02-27 PROCEDURE — 96372 THER/PROPH/DIAG INJ SC/IM: CPT

## 2018-02-27 RX ADMIN — DARBEPOETIN ALFA 100 MCG: 100 INJECTION, SOLUTION INTRAVENOUS; SUBCUTANEOUS at 10:35

## 2018-02-27 NOTE — PROGRESS NOTES
Patient here for aranesp and is doing well, she offers no c/o  She tolerated aranesp to [place] as ordered  No further appointments scheduled at this time

## 2018-02-27 NOTE — PLAN OF CARE
Problem: Potential for Falls  Goal: Patient will remain free of falls  INTERVENTIONS:  - Assess patient frequently for physical needs  -  Identify cognitive and physical deficits and behaviors that affect risk of falls    -  [place] fall precautions as indicated by assessment   - Educate patient/family on patient safety including physical limitations  - Instruct patient to call for assistance with activity based on assessment  - Modify environment to reduce risk of injury  - Consider OT/PT consult to assist with strengthening/mobility   Outcome: Progressing      Problem: PAIN - ADULT  Goal: Verbalizes/displays adequate comfort level or baseline comfort level  Interventions:  - Encourage patient to monitor pain and request assistance  - Assess pain using appropriate pain scale  - Administer analgesics based on type and severity of pain and evaluate response  - Implement non-pharmacological measures as appropriate and evaluate response  - Consider cultural and social influences on pain and pain management  - Notify physician/advanced practitioner if interventions unsuccessful or patient reports new pain  Outcome: Progressing      Problem: INFECTION - ADULT  Goal: Absence or prevention of progression during hospitalization  INTERVENTIONS:  - Assess and monitor for signs and symptoms of infection  - Monitor lab/diagnostic results  - Monitor all insertion sites, i e  indwelling lines, tubes, and drains  - Monitor endotracheal (as able) and nasal secretions for changes in amount and color  - [place] appropriate cooling/warming therapies per order  - Administer medications as ordered  - Instruct and encourage patient and family to use good hand hygiene technique  - Identify and instruct in appropriate isolation precautions for identified infection/condition  Outcome: Progressing

## 2018-03-12 ENCOUNTER — APPOINTMENT (OUTPATIENT)
Dept: LAB | Facility: CLINIC | Age: 68
End: 2018-03-12
Payer: MEDICARE

## 2018-03-12 ENCOUNTER — TRANSCRIBE ORDERS (OUTPATIENT)
Dept: LAB | Facility: CLINIC | Age: 68
End: 2018-03-12

## 2018-03-12 DIAGNOSIS — N18.4 CHRONIC KIDNEY DISEASE, STAGE IV (SEVERE) (HCC): ICD-10-CM

## 2018-03-12 DIAGNOSIS — N18.4 CHRONIC KIDNEY DISEASE, STAGE IV (SEVERE) (HCC): Primary | ICD-10-CM

## 2018-03-12 LAB
FERRITIN SERPL-MCNC: 1358 NG/ML (ref 8–388)
IRON SATN MFR SERPL: 62 %
IRON SERPL-MCNC: 166 UG/DL (ref 50–170)
TIBC SERPL-MCNC: 267 UG/DL (ref 250–450)

## 2018-03-12 PROCEDURE — 83540 ASSAY OF IRON: CPT

## 2018-03-12 PROCEDURE — 83550 IRON BINDING TEST: CPT

## 2018-03-12 PROCEDURE — 36415 COLL VENOUS BLD VENIPUNCTURE: CPT

## 2018-03-12 PROCEDURE — 82728 ASSAY OF FERRITIN: CPT

## 2018-03-20 ENCOUNTER — OFFICE VISIT (OUTPATIENT)
Dept: HEMATOLOGY ONCOLOGY | Facility: CLINIC | Age: 68
End: 2018-03-20
Payer: MEDICARE

## 2018-03-20 VITALS
WEIGHT: 161 LBS | HEART RATE: 77 BPM | HEIGHT: 64 IN | TEMPERATURE: 98.6 F | OXYGEN SATURATION: 100 % | BODY MASS INDEX: 27.49 KG/M2 | SYSTOLIC BLOOD PRESSURE: 150 MMHG | DIASTOLIC BLOOD PRESSURE: 80 MMHG

## 2018-03-20 DIAGNOSIS — N18.9 ANEMIA IN CHRONIC KIDNEY DISEASE, UNSPECIFIED CKD STAGE: Primary | ICD-10-CM

## 2018-03-20 DIAGNOSIS — D63.1 ANEMIA IN CHRONIC KIDNEY DISEASE, UNSPECIFIED CKD STAGE: Primary | ICD-10-CM

## 2018-03-20 PROBLEM — N18.30 STAGE 3 CHRONIC KIDNEY DISEASE (HCC): Status: ACTIVE | Noted: 2018-03-20

## 2018-03-20 PROCEDURE — 99214 OFFICE O/P EST MOD 30 MIN: CPT | Performed by: INTERNAL MEDICINE

## 2018-03-20 NOTE — PROGRESS NOTES
HEMATOLOGY / ONCOLOGY CLINIC NOTE    Primary Care Provider: Darlene Parikh DO  Referring Provider:    MRN: [id number]  : 1950    Reason for Encounter:  Chief Complaint   Patient presents with    Follow-up     3 month follow up         Hematology / Oncology History:     Joe Phillips is a 69 y o  female who came in for follow up  Anemia : Normocytic; multifactorial, likely drug (tacrolimus) - related , and CKD  repeat work up showed no blood loss or hemolysis  - 2018:  received 4  iv iron Venofer 300 mg iv weekly  ;   Aranesp 100 mcg weekly x 4     Interval History:     3/20 :  Patient came in for follow-up  Reported no bleeding  Strength stable  Jose dyspnea while walking a long distance  Has been reported patient still appears pale  Patient stopped following hematology in [facility]  Problem list:     Patient Active Problem List   Diagnosis    Lung transplant status, bilateral ([facility] [address] )    ROSEANNE (acute kidney injury) ([facility] [address] )    Neutropenia, drug-induced (HCC)    Anemia    Right shoulder pain    Spinal stenosis    Stage 3 chronic kidney disease       Assessment / Plan:       1  Anemia in chronic kidney disease, unspecified CKD stage   -  Normocytic; multifactorial, likely drug (tacrolimus) - related , and CKD  repeat work up showed no blood loss or hemolysis  -    Iron deficiency is now corrected, however anemia remains the same  Suggesting the anemia is likely due to bone marrow  Suppression by transplant/tacrolimus   -   Given patient is still symptomatic, recommended to give 1 unit transfusion  Will then follow patient with monthly CBC, currently managed in [facility]  Plan   -  Transfuse 1 unit PRBC, leuko reduced, CMV negative, irradiated next week  -    Follow-up in 4 months  Transfuse if needed in the future  - Type and screen; Future  - Transfuse RBC            25    minutes were spent on this visit   All questions answered to satisfaction; Advised pt to call if there is any further questions  PHYSICIAL EXAMINATION:       Vital Signs:   [unfilled]  Body mass index is 27 64 kg/m²  Body surface area is 1 78 meters squared  GEN: Alert, awake oriented x3, in no acute distress;     Wearing mask, appears pale  HEENT- No pallor, icterus, cyanosis, no oral mucosal lesions,   LAD - no palpable cervical, clavicle, axillary, inguinal LAD  Heart- normal S1 S2, regular rate and rhythm, No murmur, rubs  Lungs- clear breathing sound bilateral    Abdomen- soft, Non tender, bowel sounds present  Extremities- No cyanosis, clubbing, edema  Neuro- No focal neurological deficit           PAST MEDICAL HISTORY:   has a past medical history of Anxiety; Blood clot of neck vein; H/O pulmonary fibrosis; Pneumonitis; and Spinal stenosis  PAST SURGICAL HISTORY:   has a past surgical history that includes Lung transplant, double; Appendectomy; and Tonsillectomy  CURRENT MEDICATIONS:   Current Outpatient Prescriptions   Medication Sig Dispense Refill    atovaquone (MEPRON) 750 mg/5 mL suspension Take 750 mg by mouth 2 (two) times a day   Calcium Carb-Cholecalciferol (CALCIUM 600+D) 600-800 MG-UNIT TABS Take by mouth 2 (two) times a day      ciprofloxacin (CIPRO) 500 mg tablet Take 500 mg by mouth 2 (two) times a day   citalopram (CeleXA) 10 mg tablet Take 10 mg by mouth daily      Cyanocobalamin (HM SUPER VITAMIN B12 PO) Take 1 tablet by mouth daily   cycloSPORINE (RESTASIS) 0 05 % ophthalmic emulsion 1 drop 2 (two) times a day      docusate sodium (COLACE) 100 mg capsule Take 100 mg by mouth as needed for constipation   gabapentin (NEURONTIN) 600 MG tablet Take 300 mg by mouth 2 (two) times a day        Magnesium Cl-Calcium Carbonate (SLOW-MAG PO) Take 138 mg by mouth daily          methocarbamol (ROBAXIN) 500 mg tablet Take 250 mg by mouth daily        metoprolol tartrate (LOPRESSOR) 50 mg tablet Take 12 5 mg by mouth daily        Multiple Vitamins-Minerals (MULTIPLE VITAMINS/WOMENS PO) Take by mouth daily   mycophenolate (CELLCEPT) 250 mg capsule Take 1,000 mg by mouth 2 (two) times a day        pantoprazole (PROTONIX) 40 mg tablet Take 40 mg by mouth daily   pravastatin (PRAVACHOL) 40 mg tablet Take 40 mg by mouth daily   predniSONE 5 mg tablet Take 5 mg by mouth daily   senna (SENOKOT) 8 6 MG tablet Take 2 tablets by mouth as needed for constipation   Tacrolimus (PROGRAF PO) Take 3 5 mg by mouth 2 (two) times a day        traMADol (ULTRAM) 50 mg tablet Take 50 mg by mouth 2 (two) times a day        ipratropium (ATROVENT) 0 03 % nasal spray 2 sprays into each nostril 2 (two) times a day   phenylephrine (HEMORRHOIDAL) 0 25 % suppository Insert 1 suppository into the rectum 2 (two) times a day      Teriparatide, Recombinant, (FORTEO SC) Inject under the skin daily       No current facility-administered medications for this visit  [unfilled]    SOCIAL HISTORY:   reports that she has never smoked  She does not have any smokeless tobacco history on file  She reports that she does not drink alcohol or use drugs  FAMILY HISTORY:  Family history is unknown by patient  ALLERGIES:  is allergic to bactrim [sulfamethoxazole-trimethoprim]  REVIEW OF SYSTEMS:  Please note that a 14-point review of systems was performed to include Constitutional, HEENT, Respiratory, CVS, GI, , Musculoskeletal, Integumentary, Neurologic, Rheumatologic, Endocrinologic, Psychiatric, Lymphatic, and Hematologic/Oncologic systems were reviewed and are negative unless otherwise stated in HPI  Positive and negative findings pertinent to this evaluation are incorporated into the history of present illness              LAB:  Lab Results   Component Value Date    WBC 10 50 (H) 10/03/2017    HGB 8 5 (L) 10/03/2017    HCT 28 4 (L) 10/03/2017     (H) 10/03/2017     10/03/2017     Lab Results Component Value Date     06/24/2016    K 6 2 (H) 06/24/2016     (H) 06/24/2016    CO2 18 (L) 06/24/2016    ANIONGAP 8 06/24/2016    BUN 42 (H) 06/24/2016    CREATININE 1 57 (H) 06/24/2016    GLUCOSE 133 06/24/2016    CALCIUM 8 3 06/24/2016    AST 27 06/24/2016    ALT 33 06/24/2016    ALKPHOS 93 06/24/2016    PROT 6 5 06/24/2016    BILITOT 0 30 06/24/2016    EGFR 32 8 06/24/2016       IMAGING:  No orders to display     No results found

## 2018-03-28 ENCOUNTER — APPOINTMENT (OUTPATIENT)
Dept: LAB | Facility: CLINIC | Age: 68
End: 2018-03-28
Payer: MEDICARE

## 2018-03-28 DIAGNOSIS — N18.9 ANEMIA IN CHRONIC KIDNEY DISEASE, UNSPECIFIED CKD STAGE: ICD-10-CM

## 2018-03-28 DIAGNOSIS — D63.1 ANEMIA IN CHRONIC KIDNEY DISEASE, UNSPECIFIED CKD STAGE: ICD-10-CM

## 2018-03-28 LAB
ABO GROUP BLD: NORMAL
BLD GP AB SCN SERPL QL: NEGATIVE
RH BLD: POSITIVE
SPECIMEN EXPIRATION DATE: NORMAL

## 2018-03-28 PROCEDURE — 86900 BLOOD TYPING SEROLOGIC ABO: CPT

## 2018-03-28 PROCEDURE — 86901 BLOOD TYPING SEROLOGIC RH(D): CPT

## 2018-03-28 PROCEDURE — 86923 COMPATIBILITY TEST ELECTRIC: CPT

## 2018-03-28 PROCEDURE — 86850 RBC ANTIBODY SCREEN: CPT

## 2018-03-28 PROCEDURE — 36415 COLL VENOUS BLD VENIPUNCTURE: CPT

## 2018-03-29 RX ORDER — ACETAMINOPHEN 325 MG/1
650 TABLET ORAL ONCE
Status: COMPLETED | OUTPATIENT
Start: 2018-03-30 | End: 2018-03-30

## 2018-03-29 RX ORDER — SODIUM CHLORIDE 9 MG/ML
20 INJECTION, SOLUTION INTRAVENOUS CONTINUOUS
Status: DISCONTINUED | OUTPATIENT
Start: 2018-03-30 | End: 2018-04-02 | Stop reason: HOSPADM

## 2018-03-30 ENCOUNTER — HOSPITAL ENCOUNTER (OUTPATIENT)
Dept: INFUSION CENTER | Facility: CLINIC | Age: 68
Discharge: HOME | End: 2018-03-30
Payer: MEDICARE

## 2018-03-30 VITALS
RESPIRATION RATE: 22 BRPM | DIASTOLIC BLOOD PRESSURE: 78 MMHG | TEMPERATURE: 99.4 F | HEART RATE: 74 BPM | SYSTOLIC BLOOD PRESSURE: 122 MMHG | OXYGEN SATURATION: 99 %

## 2018-03-30 PROCEDURE — 86644 CMV ANTIBODY: CPT

## 2018-03-30 PROCEDURE — 36430 TRANSFUSION BLD/BLD COMPNT: CPT

## 2018-03-30 PROCEDURE — P9040 RBC LEUKOREDUCED IRRADIATED: HCPCS

## 2018-03-30 RX ADMIN — ACETAMINOPHEN 650 MG: 325 TABLET, FILM COATED ORAL at 10:50

## 2018-03-30 RX ADMIN — SODIUM CHLORIDE 20 ML/HR: 0.9 INJECTION, SOLUTION INTRAVENOUS at 10:25

## 2018-03-30 NOTE — PROGRESS NOTES
Patient to Hugo for Blood Product Transfusion: Offers no complaints at present time: Pale: Weak: Right FA PIV initiated without incident

## 2018-03-31 LAB
ABO GROUP BLD BPU: NORMAL
BPU ID: NORMAL
UNIT DISPENSE STATUS: NORMAL
UNIT PRODUCT CODE: NORMAL
UNIT RH: NORMAL

## 2018-04-03 ENCOUNTER — TELEPHONE (OUTPATIENT)
Dept: FAMILY MEDICINE CLINIC | Facility: CLINIC | Age: 68
End: 2018-04-03

## 2018-04-03 DIAGNOSIS — M48.00 SPINAL STENOSIS, UNSPECIFIED SPINAL REGION: Primary | ICD-10-CM

## 2018-05-09 ENCOUNTER — OFFICE VISIT (OUTPATIENT)
Dept: PAIN MEDICINE | Facility: CLINIC | Age: 68
End: 2018-05-09
Payer: MEDICARE

## 2018-05-09 VITALS
DIASTOLIC BLOOD PRESSURE: 70 MMHG | HEIGHT: 64 IN | BODY MASS INDEX: 27.49 KG/M2 | TEMPERATURE: 99.1 F | HEART RATE: 76 BPM | SYSTOLIC BLOOD PRESSURE: 124 MMHG | WEIGHT: 161 LBS

## 2018-05-09 DIAGNOSIS — M48.062 SPINAL STENOSIS OF LUMBAR REGION WITH NEUROGENIC CLAUDICATION: Primary | ICD-10-CM

## 2018-05-09 PROCEDURE — 99204 OFFICE O/P NEW MOD 45 MIN: CPT | Performed by: ANESTHESIOLOGY

## 2018-05-09 NOTE — PROGRESS NOTES
Assessment:  1  Spinal stenosis of lumbar region with neurogenic claudication        Plan:  The patient's symptoms, history/ physical are consistent with pain that is multifactorial in origin but predominantly the result of her severe multilevel stenosis  Her worst symptoms seem to be emanating from her L4-5 level  At this time, I discussed performing bilateral L4 transforaminal epidural steroid injections to help reduce swelling and inflammation which is leading to her pain symptoms  She was apprised of the most common risks and would like to proceed  She will be scheduled for an upcoming Tuesday or Thursday under fluoroscopic guidance  Complete risks and benefits including bleeding, infection, tissue reaction, nerve injury and allergic reaction were discussed  The approach was demonstrated using models and literature was provided  Verbal and written consent was obtained  My impressions and treatment recommendations were discussed in detail with the patient who verbalized understanding and had no further questions  Discharge instructions were provided  I personally saw and examined the patient and I agree with the above discussed plan of care  Orders Placed This Encounter   Procedures    FL spine and pain procedure     Standing Status:   Future     Standing Expiration Date:   5/9/2019     Order Specific Question:   Reason for Exam:     Answer:   Bilateral L4 TF SHERRIE     Order Specific Question:   Anticoagulant hold needed? Answer:   No     No orders of the defined types were placed in this encounter  History of Present Illness:    Krish Dietrich is a 77 y o  female who presents for consultation in regards to lower back and bilateral lower extremity pain  Symptoms have been present for many years described to be moderate to severe and rated 8/10 on a numeric rating scale, felt nearly constantly    Symptoms in the low back are dull, aching, sharp, pressure-like with pain radiating into both legs with numbness and paresthesias  She feels weakness of both legs  Symptoms are aggravated with standing, walking, exercise and decreased with bending, sitting and relaxation  Treatment history has included exercise on her own and heat/ice treatment which provides no relief  She is currently on Tylenol, tramadol and gabapentin which provides mild relief  Of note, the patient has a history of pulmonary fibrosis and is status post double lung transplant at [facility] in September 2015  I have personally reviewed and/or updated the patient's past medical history, past surgical history, family history, social history, current medications, allergies, and vital signs today  Review of Systems:    Review of Systems   Constitutional: Negative for fever and unexpected weight change  HENT: Negative for trouble swallowing  Eyes: Negative for visual disturbance  Respiratory: Negative for shortness of breath and wheezing  Cardiovascular: Negative for chest pain and palpitations  Gastrointestinal: Positive for diarrhea and vomiting  Negative for constipation and nausea  Endocrine: Negative for cold intolerance, heat intolerance and polydipsia  Genitourinary: Negative for difficulty urinating and frequency  Musculoskeletal: Positive for gait problem  Negative for arthralgias, joint swelling and myalgias  Skin: Negative for rash  Neurological: Positive for dizziness and weakness  Negative for seizures, syncope and headaches  Hematological: Does not bruise/bleed easily  Psychiatric/Behavioral: Negative for dysphoric mood  All other systems reviewed and are negative        Patient Active Problem List   Diagnosis    [facility], [facility]    ROSEANNE (acute kidney injury) ([facility] [address] [facility]    Neutropenia, drug-induced ([facility] [facility] )    Anemia    Right shoulder pain    Spinal stenosis    Stage 3 chronic kidney disease       Past Medical History:   Diagnosis Date    Anxiety     Blood clot of neck vein     R side    H/O pulmonary fibrosis     Pneumonitis     Spinal stenosis        Past Surgical History:   Procedure Laterality Date    APPENDECTOMY      LUNG TRANSPLANT, DOUBLE      Pt had fibrosis and pneumonitis    TONSILLECTOMY         Family History   Problem Relation Age of Onset    Family history unknown: Yes       Social History     Occupational History    Not on file  Social History Main Topics    Smoking status: Never Smoker    Smokeless tobacco: Not on file    Alcohol use No    Drug use: No    Sexual activity: Not on file       Current Outpatient Prescriptions on File Prior to Visit   Medication Sig    atovaquone (MEPRON) 750 mg/5 mL suspension Take 750 mg by mouth 2 (two) times a day   Calcium Carb-Cholecalciferol (CALCIUM 600+D) 600-800 MG-UNIT TABS Take by mouth 2 (two) times a day    citalopram (CeleXA) 10 mg tablet Take 10 mg by mouth daily    Cyanocobalamin (HM SUPER VITAMIN B12 PO) Take 1 tablet by mouth daily   cycloSPORINE (RESTASIS) 0 05 % ophthalmic emulsion 1 drop 2 (two) times a day    docusate sodium (COLACE) 100 mg capsule Take 100 mg by mouth as needed for constipation   gabapentin (NEURONTIN) 600 MG tablet Take 300 mg by mouth 2 (two) times a day      ipratropium (ATROVENT) 0 03 % nasal spray 2 sprays into each nostril 2 (two) times a day   Magnesium Cl-Calcium Carbonate (SLOW-MAG PO) Take 138 mg by mouth daily   methocarbamol (ROBAXIN) 500 mg tablet Take 250 mg by mouth daily      metoprolol tartrate (LOPRESSOR) 50 mg tablet Take 12 5 mg by mouth daily      Multiple Vitamins-Minerals (MULTIPLE VITAMINS/WOMENS PO) Take by mouth daily   mycophenolate (CELLCEPT) 250 mg capsule Take 1,000 mg by mouth 2 (two) times a day      Tacrolimus (PROGRAF PO) Take 3 mg by mouth 2 (two) times a day      ciprofloxacin (CIPRO) 500 mg tablet Take 500 mg by mouth 2 (two) times a day      pantoprazole (PROTONIX) 40 mg tablet Take 40 mg by mouth daily   phenylephrine (HEMORRHOIDAL) 0 25 % suppository Insert 1 suppository into the rectum 2 (two) times a day    pravastatin (PRAVACHOL) 40 mg tablet Take 40 mg by mouth daily   predniSONE 5 mg tablet Take 5 mg by mouth daily   senna (SENOKOT) 8 6 MG tablet Take 2 tablets by mouth as needed for constipation   Teriparatide, Recombinant, (FORTEO SC) Inject under the skin daily    traMADol (ULTRAM) 50 mg tablet Take 50 mg by mouth 2 (two) times a day       No current facility-administered medications on file prior to visit  Allergies   Allergen Reactions    Bactrim [Sulfamethoxazole-Trimethoprim] Rash       Physical Exam:    /70   Pulse 76   Temp 99 1 °F (37 3 °C) (Oral)   Ht 5' 4" (1 626 m)   Wt 73 kg (161 lb)   LMP  (LMP Unknown)   BMI 27 64 kg/m²     Constitutional: normal, well developed, well nourished, alert, in no distress and non-toxic and no overt pain behavior  Eyes: anicteric  HEENT: grossly intact  Neck: supple, symmetric, trachea midline and no masses   Pulmonary:even and unlabored  Cardiovascular:No edema or pitting edema present  Skin:Normal without rashes or lesions and well hydrated  Psychiatric:Mood and affect appropriate  Neurologic:Cranial Nerves II-XII grossly intact  Musculoskeletal:antalgic and ambulates with a walker     Lumbar Spine Exam  Appearance:  Normal lordosis  Palpation/Tenderness:  left lumbar paraspinal tenderness  right lumbar paraspinal tenderness  left sacroiliac joint tenderness  right sacroiliac joint tenderness  left piriformis tenderness  right piriformis tenderness  Sensory:  no sensory deficits noted  Range of Motion:  Flexion:   Moderately limited  with pain  Extension:  Moderately limited  with pain  Lateral Flexion - Left:  Moderately limited  with pain  Lateral Flexion - Right:  Moderately limited  with pain  Rotation - Left:  Moderately limited  with pain  Rotation - Right:  Moderately limited  with pain  Motor Strength:  Left hip flexion:  5/5  Left hip extension:  5/5  Right hip flexion:  5/5  Right hip extension:  5/5  Left knee flexion:  5/5  Left knee extension:  5/5  Right knee flexion:  5/5  Right knee extension:  5/5  Left foot dorsiflexion:  5/5  Left foot plantar flexion:  5/5  Right foot dorsiflexion:  2/5  Right foot plantar flexion:  5/5  Reflexes:  Left Patellar:  2+   Right Patellar:  2+   Left Achilles:  2+   Right Achilles:  2+   Special Tests:  Left Straight Leg Test:  positive  Right Straight Leg Test:  positive    Imaging    MRI Lumbar Spine (2/14/2018)  L1-2:  Moderate bilateral facet hypertrophy  Disc osteophyte complex with prominent osteophyte posterior to the L1 vertebral body  Severe spinal canal stenosis  L2-3:  Diffuse disc bulge  Moderate bilateral facet hypertrophy and ligamentum flavum infolding  Severe spinal canal stenosis  Severe bilateral neural foraminal stenosis  L3-4:  Moderate bilateral facet hypertrophy  Severe spinal canal stenosis  Severe right and moderate to severe left neural foraminal stenosis  L4-5:  Moderate bilateral facet hypertrophy  Diffuse disc bulge  Severe spinal canal stenosis  L5-S1:  Diffuse disc bulge  Mild bilateral facet hypertrophy

## 2018-05-09 NOTE — LETTER
May 9, 2018     [facility], [address] [place] [address] [address] [place] [address]    Patient: George Mckenzie   YOB: 1950   Date of Visit: 5/9/2018       Dear Dr Amarilis Boykin: Thank you for referring Hannah Fuller to me for evaluation  Below are my notes for this consultation  If you have questions, please do not hesitate to call me  I look forward to following your patient along with you  Sincerely,        Christiano Mims MD        CC: No Recipients  Christiano Mims MD  5/9/2018 10:46 AM  Sign at close encounter  Assessment:  1  Spinal stenosis of lumbar region with neurogenic claudication        Plan:  The patient's symptoms, history/ physical are consistent with pain that is multifactorial in origin but predominantly the result of her severe multilevel stenosis  Her worst symptoms seem to be emanating from her L4-5 level  At this time, I discussed performing bilateral L4 transforaminal epidural steroid injections to help reduce swelling and inflammation which is leading to her pain symptoms  She was apprised of the most common risks and would like to proceed  She will be scheduled for an upcoming Tuesday or Thursday under fluoroscopic guidance  Complete risks and benefits including bleeding, infection, tissue reaction, nerve injury and allergic reaction were discussed  The approach was demonstrated using models and literature was provided  Verbal and written consent was obtained  My impressions and treatment recommendations were discussed in detail with the patient who verbalized understanding and had no further questions  Discharge instructions were provided  I personally saw and examined the patient and I agree with the above discussed plan of care      Orders Placed This Encounter   Procedures    FL spine and pain procedure     Standing Status:   Future     Standing Expiration Date:   5/9/2019     Order Specific Question:   Reason for Exam: Answer:   Bilateral L4 TF SHERRIE     Order Specific Question:   Anticoagulant hold needed? Answer:   No     No orders of the defined types were placed in this encounter  History of Present Illness:    Krish Dietrich is a 77 y o  female who presents for consultation in regards to lower back and bilateral lower extremity pain  Symptoms have been present for many years described to be moderate to severe and rated 8/10 on a numeric rating scale, felt nearly constantly  Symptoms in the low back are dull, aching, sharp, pressure-like with pain radiating into both legs with numbness and paresthesias  She feels weakness of both legs  Symptoms are aggravated with standing, walking, exercise and decreased with bending, sitting and relaxation  Treatment history has included exercise on her own and heat/ice treatment which provides no relief  She is currently on Tylenol, tramadol and gabapentin which provides mild relief  Of note, the patient has a history of pulmonary fibrosis and is status post double lung transplant at [facility] in September 2015  I have personally reviewed and/or updated the patient's past medical history, past surgical history, family history, social history, current medications, allergies, and vital signs today  Review of Systems:    Review of Systems   Constitutional: Negative for fever and unexpected weight change  HENT: Negative for trouble swallowing  Eyes: Negative for visual disturbance  Respiratory: Negative for shortness of breath and wheezing  Cardiovascular: Negative for chest pain and palpitations  Gastrointestinal: Positive for diarrhea and vomiting  Negative for constipation and nausea  Endocrine: Negative for cold intolerance, heat intolerance and polydipsia  Genitourinary: Negative for difficulty urinating and frequency  Musculoskeletal: Positive for gait problem  Negative for arthralgias, joint swelling and myalgias  Skin: Negative for rash  Neurological: Positive for dizziness and weakness  Negative for seizures, syncope and headaches  Hematological: Does not bruise/bleed easily  Psychiatric/Behavioral: Negative for dysphoric mood  All other systems reviewed and are negative  Patient Active Problem List   Diagnosis    [facility] status, bilateral [facility] [address] [facility] )    ROSEANNE (acute kidney injury) ([facility] [address] [facility] )    Neutropenia, drug-induced (HCC)    Anemia    Right shoulder pain    Spinal stenosis    Stage 3 chronic kidney disease       Past Medical History:   Diagnosis Date    Anxiety     Blood clot of neck vein     R side    H/O pulmonary fibrosis     Pneumonitis     Spinal stenosis        Past Surgical History:   Procedure Laterality Date    APPENDECTOMY      LUNG TRANSPLANT, DOUBLE      Pt had fibrosis and pneumonitis    TONSILLECTOMY         Family History   Problem Relation Age of Onset    Family history unknown: Yes       Social History     Occupational History    Not on file  Social History Main Topics    Smoking status: Never Smoker    Smokeless tobacco: Not on file    Alcohol use No    Drug use: No    Sexual activity: Not on file       Current Outpatient Prescriptions on File Prior to Visit   Medication Sig    atovaquone (MEPRON) 750 mg/5 mL suspension Take 750 mg by mouth 2 (two) times a day   Calcium Carb-Cholecalciferol (CALCIUM 600+D) 600-800 MG-UNIT TABS Take by mouth 2 (two) times a day    citalopram (CeleXA) 10 mg tablet Take 10 mg by mouth daily    Cyanocobalamin (HM SUPER VITAMIN B12 PO) Take 1 tablet by mouth daily   cycloSPORINE (RESTASIS) 0 05 % ophthalmic emulsion 1 drop 2 (two) times a day    docusate sodium (COLACE) 100 mg capsule Take 100 mg by mouth as needed for constipation   gabapentin (NEURONTIN) 600 MG tablet Take 300 mg by mouth 2 (two) times a day      ipratropium (ATROVENT) 0 03 % nasal spray 2 sprays into each nostril 2 (two) times a day      Magnesium Cl-Calcium Carbonate (SLOW-MAG PO) Take 138 mg by mouth daily   methocarbamol (ROBAXIN) 500 mg tablet Take 250 mg by mouth daily      metoprolol tartrate (LOPRESSOR) 50 mg tablet Take 12 5 mg by mouth daily      Multiple Vitamins-Minerals (MULTIPLE VITAMINS/WOMENS PO) Take by mouth daily   mycophenolate (CELLCEPT) 250 mg capsule Take 1,000 mg by mouth 2 (two) times a day      Tacrolimus (PROGRAF PO) Take 3 mg by mouth 2 (two) times a day      ciprofloxacin (CIPRO) 500 mg tablet Take 500 mg by mouth 2 (two) times a day   pantoprazole (PROTONIX) 40 mg tablet Take 40 mg by mouth daily   phenylephrine (HEMORRHOIDAL) 0 25 % suppository Insert 1 suppository into the rectum 2 (two) times a day    pravastatin (PRAVACHOL) 40 mg tablet Take 40 mg by mouth daily   predniSONE 5 mg tablet Take 5 mg by mouth daily   senna (SENOKOT) 8 6 MG tablet Take 2 tablets by mouth as needed for constipation   Teriparatide, Recombinant, (FORTEO SC) Inject under the skin daily    traMADol (ULTRAM) 50 mg tablet Take 50 mg by mouth 2 (two) times a day       No current facility-administered medications on file prior to visit  Allergies   Allergen Reactions    Bactrim [Sulfamethoxazole-Trimethoprim] Rash       Physical Exam:    /70   Pulse 76   Temp 99 1 °F (37 3 °C) (Oral)   Ht 5' 4" (1 626 m)   Wt 73 kg (161 lb)   LMP  (LMP Unknown)   BMI 27 64 kg/m²      Constitutional: normal, well developed, well nourished, alert, in no distress and non-toxic and no overt pain behavior    Eyes: anicteric  HEENT: grossly intact  Neck: supple, symmetric, trachea midline and no masses   Pulmonary:even and unlabored  Cardiovascular:No edema or pitting edema present  Skin:Normal without rashes or lesions and well hydrated  Psychiatric:Mood and affect appropriate  Neurologic:Cranial Nerves II-XII grossly intact  Musculoskeletal:antalgic and ambulates with a walker     Lumbar Spine Exam  Appearance:  Normal lordosis  Palpation/Tenderness:  left lumbar paraspinal tenderness  right lumbar paraspinal tenderness  left sacroiliac joint tenderness  right sacroiliac joint tenderness  left piriformis tenderness  right piriformis tenderness  Sensory:  no sensory deficits noted  Range of Motion:  Flexion: Moderately limited  with pain  Extension:  Moderately limited  with pain  Lateral Flexion - Left:  Moderately limited  with pain  Lateral Flexion - Right:  Moderately limited  with pain  Rotation - Left:  Moderately limited  with pain  Rotation - Right:  Moderately limited  with pain  Motor Strength:  Left hip flexion:  5/5  Left hip extension:  5/5  Right hip flexion:  5/5  Right hip extension:  5/5  Left knee flexion:  5/5  Left knee extension:  5/5  Right knee flexion:  5/5  Right knee extension:  5/5  Left foot dorsiflexion:  5/5  Left foot plantar flexion:  5/5  Right foot dorsiflexion:  2/5  Right foot plantar flexion:  5/5  Reflexes:  Left Patellar:  2+   Right Patellar:  2+   Left Achilles:  2+   Right Achilles:  2+   Special Tests:  Left Straight Leg Test:  positive  Right Straight Leg Test:  positive    Imaging    MRI Lumbar Spine (2/14/2018)  L1-2:  Moderate bilateral facet hypertrophy  Disc osteophyte complex with prominent osteophyte posterior to the L1 vertebral body  Severe spinal canal stenosis  L2-3:  Diffuse disc bulge  Moderate bilateral facet hypertrophy and ligamentum flavum infolding  Severe spinal canal stenosis  Severe bilateral neural foraminal stenosis  L3-4:  Moderate bilateral facet hypertrophy  Severe spinal canal stenosis  Severe right and moderate to severe left neural foraminal stenosis  L4-5:  Moderate bilateral facet hypertrophy  Diffuse disc bulge  Severe spinal canal stenosis  L5-S1:  Diffuse disc bulge  Mild bilateral facet hypertrophy

## 2018-05-24 ENCOUNTER — HOSPITAL ENCOUNTER (OUTPATIENT)
Dept: RADIOLOGY | Facility: CLINIC | Age: 68
Discharge: HOME | End: 2018-05-24
Attending: ANESTHESIOLOGY | Admitting: ANESTHESIOLOGY
Payer: MEDICARE

## 2018-05-24 VITALS
TEMPERATURE: 98.6 F | SYSTOLIC BLOOD PRESSURE: 135 MMHG | DIASTOLIC BLOOD PRESSURE: 71 MMHG | OXYGEN SATURATION: 99 % | HEART RATE: 83 BPM | RESPIRATION RATE: 18 BRPM

## 2018-05-24 DIAGNOSIS — M48.062 SPINAL STENOSIS OF LUMBAR REGION WITH NEUROGENIC CLAUDICATION: ICD-10-CM

## 2018-05-24 PROCEDURE — 64483 NJX AA&/STRD TFRM EPI L/S 1: CPT | Performed by: ANESTHESIOLOGY

## 2018-05-24 RX ORDER — BUPIVACAINE HCL/PF 2.5 MG/ML
10 VIAL (ML) INJECTION ONCE
Status: COMPLETED | OUTPATIENT
Start: 2018-05-24 | End: 2018-05-24

## 2018-05-24 RX ORDER — RISEDRONATE SODIUM 5 MG/1
5 TABLET, FILM COATED ORAL
COMMUNITY
End: 2020-12-04

## 2018-05-24 RX ORDER — METHYLPREDNISOLONE ACETATE 80 MG/ML
80 INJECTION, SUSPENSION INTRA-ARTICULAR; INTRALESIONAL; INTRAMUSCULAR; PARENTERAL; SOFT TISSUE ONCE
Status: COMPLETED | OUTPATIENT
Start: 2018-05-24 | End: 2018-05-24

## 2018-05-24 RX ORDER — LIDOCAINE HYDROCHLORIDE 10 MG/ML
10 INJECTION, SOLUTION EPIDURAL; INFILTRATION; INTRACAUDAL; PERINEURAL ONCE
Status: COMPLETED | OUTPATIENT
Start: 2018-05-24 | End: 2018-05-24

## 2018-05-24 RX ADMIN — LIDOCAINE HYDROCHLORIDE 8 ML: 10 INJECTION, SOLUTION EPIDURAL; INFILTRATION; INTRACAUDAL; PERINEURAL at 15:12

## 2018-05-24 RX ADMIN — METHYLPREDNISOLONE ACETATE 80 MG: 80 INJECTION, SUSPENSION INTRA-ARTICULAR; INTRALESIONAL; INTRAMUSCULAR; PARENTERAL; SOFT TISSUE at 15:14

## 2018-05-24 RX ADMIN — IOHEXOL 1 ML: 300 INJECTION, SOLUTION INTRAVENOUS at 15:14

## 2018-05-24 RX ADMIN — Medication 2 ML: at 15:14

## 2018-05-24 NOTE — H&P
History of Present Illness: The patient is a 77 y o  female who presents with complaints of lower back and leg pain secondary to spinal stenosis and is here today for bilateral L4 transforaminal epidural steroid injection  Patient Active Problem List   Diagnosis    Lung transplant status, bilateral ([facility] )    [facility]ANNE [facility]acute [facility]) ([facility] [address] [facility]    Neutropenia, drug-induced (HCC)    Anemia    Right shoulder pain    Spinal stenosis    Stage 3 chronic kidney disease       Past Medical History:   Diagnosis Date    Anxiety     Blood clot of neck vein     R side    H/O pulmonary fibrosis     Pneumonitis     Spinal stenosis        Past Surgical History:   Procedure Laterality Date    APPENDECTOMY      LUNG TRANSPLANT, DOUBLE      Pt had fibrosis and pneumonitis    TONSILLECTOMY           Current Outpatient Prescriptions:     atovaquone (MEPRON) 750 mg/5 mL suspension, Take 750 mg by mouth 2 (two) times a day , Disp: , Rfl:     Calcium Carb-Cholecalciferol (CALCIUM 600+D) 600-800 MG-UNIT TABS, Take by mouth 2 (two) times a day, Disp: , Rfl:     citalopram (CeleXA) 10 mg tablet, Take 10 mg by mouth daily, Disp: , Rfl:     Cyanocobalamin (HM SUPER VITAMIN B12 PO), Take 1 tablet by mouth daily  , Disp: , Rfl:     cycloSPORINE (RESTASIS) 0 05 % ophthalmic emulsion, 1 drop 2 (two) times a day, Disp: , Rfl:     docusate sodium (COLACE) 100 mg capsule, Take 100 mg by mouth as needed for constipation  , Disp: , Rfl:     gabapentin (NEURONTIN) 600 MG tablet, Take 300 mg by mouth 2 (two) times a day  , Disp: , Rfl:     Magnesium Cl-Calcium Carbonate (SLOW-MAG PO), Take 138 mg by mouth daily  , Disp: , Rfl:     metoprolol tartrate (LOPRESSOR) 50 mg tablet, Take 25 mg by mouth daily  , Disp: , Rfl:     Multiple Vitamins-Minerals (MULTIPLE VITAMINS/WOMENS PO), Take by mouth daily    , Disp: , Rfl:     mycophenolate (CELLCEPT) 250 mg capsule, Take 1,000 mg by mouth 2 (two) times a day  , Disp: , Rfl:    pantoprazole (PROTONIX) 40 mg tablet, Take 40 mg by mouth daily  , Disp: , Rfl:     pravastatin (PRAVACHOL) 40 mg tablet, Take 40 mg by mouth daily  , Disp: , Rfl:     predniSONE 5 mg tablet, Take 5 mg by mouth daily  , Disp: , Rfl:     risedronate (ACTONEL) 5 MG tablet, Take 5 mg by mouth every morning before breakfast with water on empty stomach, nothing by mouth or lie down for next 30 minutes  , Disp: , Rfl:     Tacrolimus (PROGRAF PO), Take 3 mg by mouth 2 (two) times a day  , Disp: , Rfl:     traMADol (ULTRAM) 50 mg tablet, Take 50 mg by mouth 2 (two) times a day  , Disp: , Rfl:     Current Facility-Administered Medications:     bupivacaine (PF) (MARCAINE) 0 25 % injection 10 mL, 10 mL, Epidural, Once, Lizeth Hagan MD    iohexol (OMNIPAQUE) 300 mg/mL injection 50 mL, 50 mL, Epidural, Once, Lizeth Hagan MD    lidocaine (PF) (XYLOCAINE-MPF) 1 % injection 10 mL, 10 mL, Infiltration, Once, Lizeth Hagan MD    methylPREDNISolone acetate (DEPO-MEDROL) injection 80 mg, 80 mg, Epidural, Once, Lizeth Hagan MD    Allergies   Allergen Reactions    Bactrim [Sulfamethoxazole-Trimethoprim] Rash       Physical Exam:   Vitals:    05/24/18 1441   BP: 118/67   Pulse: 86   Resp: 20   Temp: 98 6 °F (37 °C)   SpO2: 98%     General: Awake, Alert, Oriented x 3, Mood and affect appropriate  Respiratory: Respirations even and unlabored  Cardiovascular: Peripheral pulses intact; no edema  Musculoskeletal Exam:  Bilateral lower back tenderness    ASA Score: 3    Assessment:   1   Spinal stenosis of lumbar region with neurogenic claudication        Plan: Bilateral L4 TF SHERRIE

## 2018-05-24 NOTE — DISCHARGE INSTRUCTIONS
Epidural Steroid Injection   WHAT YOU NEED TO KNOW:   An epidural steroid injection (SHERRIE) is a procedure to inject steroid medicine into the epidural space  The epidural space is between your spinal cord and vertebrae  Steroids reduce inflammation and fluid buildup in your spine that may be causing pain  You may be given pain medicine along with the steroids  ACTIVITY  · Do not drive or operate machinery today  · No strenuous activity today - bending, lifting, etc   · You may resume normal activites starting tomorrow - start slowly and as tolerated  · You may shower today, but no tub baths or hot tubs  · You may have numbness for several hours from the local anesthetic  Please use caution and common sense, especially with weight-bearing activities  CARE OF THE INJECTION SITE  · If you have soreness or pain, apply ice to the area today (20 minutes on/20 minutes off)  · Starting tomorrow, you may use warm, moist heat or ice if needed  · You may have an increase or change in your discomfort for 36-48 hours after your treatment  · Apply ice and continue with any pain medication you have been prescribed  · Notify [facility] if you have any of the following: redness, drainage, swelling, headache, stiff neck or fever above 100°F     SPECIAL INSTRUCTIONS  · Our office will contact you in approximately 7 days for a progress report  MEDICATIONS  · Continue to take all routine medications  · Our office may have instructed you to hold some medications  If you have a problem specifically related to your procedure, please call our office at [phone]  Problems not related to your procedure should be directed to your primary care physician

## 2018-05-29 NOTE — PROGRESS NOTES
Pt has cbc every 2 weeks from [facility] , where she had her kidney transplant    - more recent one showed CBC w Hb = 6 9; decreased from 8 4 on 5/9  No bleeding, feeling ok  Per pt, the Hb fluctuates in the range of 6-8  Decided to continue to observe

## 2018-05-31 ENCOUNTER — TELEPHONE (OUTPATIENT)
Dept: RADIOLOGY | Facility: CLINIC | Age: 68
End: 2018-05-31

## 2018-06-04 NOTE — TELEPHONE ENCOUNTER
Patient called back stating she feels a little better  Relief of 40%  Current level 0/10   Only when standing gets tight  c/b [phone]

## 2018-06-05 ENCOUNTER — HOSPITAL ENCOUNTER (EMERGENCY)
Facility: HOSPITAL | Age: 68
Discharge: HOME | End: 2018-06-05
Attending: EMERGENCY MEDICINE
Payer: MEDICARE

## 2018-06-05 VITALS
HEART RATE: 74 BPM | BODY MASS INDEX: 26.72 KG/M2 | RESPIRATION RATE: 20 BRPM | SYSTOLIC BLOOD PRESSURE: 160 MMHG | WEIGHT: 155.65 LBS | TEMPERATURE: 99.8 F | DIASTOLIC BLOOD PRESSURE: 67 MMHG | OXYGEN SATURATION: 100 %

## 2018-06-05 DIAGNOSIS — B02.9 SHINGLES: Primary | ICD-10-CM

## 2018-06-05 PROCEDURE — 99282 EMERGENCY DEPT VISIT SF MDM: CPT

## 2018-06-05 RX ORDER — VALACYCLOVIR HYDROCHLORIDE 1 G/1
1000 TABLET, FILM COATED ORAL 3 TIMES DAILY
Qty: 21 TABLET | Refills: 0 | Status: SHIPPED | OUTPATIENT
Start: 2018-06-05 | End: 2018-10-17 | Stop reason: ALTCHOICE

## 2018-06-05 RX ORDER — LIDOCAINE 50 MG/G
OINTMENT TOPICAL AS NEEDED
Qty: 35.44 G | Refills: 0 | Status: SHIPPED | OUTPATIENT
Start: 2018-06-05 | End: 2018-06-12 | Stop reason: SDUPTHER

## 2018-06-05 RX ORDER — VALACYCLOVIR HYDROCHLORIDE 500 MG/1
1000 TABLET, FILM COATED ORAL ONCE
Status: COMPLETED | OUTPATIENT
Start: 2018-06-05 | End: 2018-06-05

## 2018-06-05 RX ADMIN — VALACYCLOVIR HYDROCHLORIDE 1000 MG: 500 TABLET, FILM COATED ORAL at 22:10

## 2018-06-05 NOTE — TELEPHONE ENCOUNTER
Patient states that she still feels tightness in the lower back  Patient states that it feels better than what it was  Pain scale is 0/10    Patient is now experiencing pain across her shoulders blade  Patient also states that she is experiencing pain on her left side from the waist up  Patient describes the pain as someone is stabbing her with pins and needs  Patient rates the pain a 9/10  Pain started two day ago

## 2018-06-06 NOTE — ED PROVIDER NOTES
History  Chief Complaint   Patient presents with    Rash     Pt c/o left sided rib pain 2 days ago, now started with a rash today  Pt recently exposed to shingles       78 yo F with history of double lung transplant who presents today for evaluation of rash x1 day  She noticed a  rash when she was getting out of the shower today  For the past 2 days she has had pain along her left ribcage that has felt burning and sharp and tender to touch  She denies any associated fevers or chills, malaise, chest pain, shortness of breath, abdominal pain, vomiting, arthralgias  She does state that she was recently exposed to shingles at her doctor's office  She did have chickenpox as a child  She did not have her shingles vaccine d/t her history of lung transplant  She has no other complaints at this time  Prior to Admission Medications   Prescriptions Last Dose Informant Patient Reported? Taking? Calcium Carb-Cholecalciferol (CALCIUM 600+D) 600-800 MG-UNIT TABS  Self Yes No   Sig: Take by mouth 2 (two) times a day   Cyanocobalamin (HM SUPER VITAMIN B12 PO)   Yes No   Sig: Take 1 tablet by mouth daily  Magnesium Cl-Calcium Carbonate (SLOW-MAG PO)   Yes No   Sig: Take 138 mg by mouth daily  Multiple Vitamins-Minerals (MULTIPLE VITAMINS/WOMENS PO)   Yes No   Sig: Take by mouth daily  Tacrolimus (PROGRAF PO)  Self Yes No   Sig: Take 3 mg by mouth 2 (two) times a day     atovaquone (MEPRON) 750 mg/5 mL suspension   Yes No   Sig: Take 750 mg by mouth 2 (two) times a day  citalopram (CeleXA) 10 mg tablet   Yes No   Sig: Take 10 mg by mouth daily   cycloSPORINE (RESTASIS) 0 05 % ophthalmic emulsion   Yes No   Si drop 2 (two) times a day   docusate sodium (COLACE) 100 mg capsule   Yes No   Sig: Take 100 mg by mouth as needed for constipation       gabapentin (NEURONTIN) 600 MG tablet  Self Yes No   Sig: Take 300 mg by mouth 2 (two) times a day     metoprolol tartrate (LOPRESSOR) 50 mg tablet  Self Yes No   Sig: Take 25 mg by mouth daily     mycophenolate (CELLCEPT) 250 mg capsule  Self Yes No   Sig: Take 1,000 mg by mouth 2 (two) times a day     pantoprazole (PROTONIX) 40 mg tablet   Yes No   Sig: Take 40 mg by mouth daily  pravastatin (PRAVACHOL) 40 mg tablet   Yes No   Sig: Take 40 mg by mouth daily  predniSONE 5 mg tablet   Yes No   Sig: Take 5 mg by mouth daily  risedronate (ACTONEL) 5 MG tablet  Self Yes No   Sig: Take 5 mg by mouth every morning before breakfast with water on empty stomach, nothing by mouth or lie down for next 30 minutes  traMADol (ULTRAM) 50 mg tablet   Yes No   Sig: Take 50 mg by mouth 2 (two) times a day        Facility-Administered Medications: None       Past Medical History:   Diagnosis Date    Anxiety     Blood clot of neck vein     R side    H/O pulmonary fibrosis     Pneumonitis     Spinal stenosis        Past Surgical History:   Procedure Laterality Date    APPENDECTOMY      LUNG TRANSPLANT, DOUBLE      Pt had fibrosis and pneumonitis    TONSILLECTOMY         Family History   Problem Relation Age of Onset    Family history unknown: Yes     I have reviewed and agree with the history as documented  Social History   Substance Use Topics    Smoking status: Never Smoker    Smokeless tobacco: Not on file    Alcohol use No        Review of Systems   Constitutional: Negative for chills and fever  Respiratory: Negative for shortness of breath  Cardiovascular: Negative for chest pain  Gastrointestinal: Negative for abdominal pain and vomiting  Musculoskeletal: Negative for arthralgias and myalgias  Skin: Positive for rash  Neurological: Negative for weakness and numbness  All other systems reviewed and are negative  Physical Exam  Physical Exam   Constitutional: She is oriented to person, place, and time  She appears well-developed and well-nourished  Non-toxic appearance  She does not have a sickly appearance  She does not appear ill   No distress  HENT:   Head: Normocephalic and atraumatic  Right Ear: Hearing and external ear normal    Left Ear: Hearing and external ear normal    Nose: Nose normal    Mouth/Throat: Uvula is midline, oropharynx is clear and moist and mucous membranes are normal    Eyes: Conjunctivae and EOM are normal  Pupils are equal, round, and reactive to light  Neck: Normal range of motion  Neck supple  Cardiovascular: Normal rate, regular rhythm and normal heart sounds  Exam reveals no gallop and no friction rub  No murmur heard  Pulmonary/Chest: Effort normal and breath sounds normal  No respiratory distress  She has no decreased breath sounds  She has no wheezes  She has no rhonchi  She has no rales  Musculoskeletal: Normal range of motion  Neurological: She is alert and oriented to person, place, and time  Skin: Skin is warm and dry  Capillary refill takes less than 2 seconds  Rash noted  No burn, no petechiae and no purpura noted  Rash is vesicular  She is not diaphoretic  There is erythema  Psychiatric: She has a normal mood and affect  Nursing note and vitals reviewed        Vital Signs  ED Triage Vitals [06/05/18 2121]   Temperature Pulse Respirations Blood Pressure SpO2   99 8 °F (37 7 °C) 91 20 160/67 100 %      Temp Source Heart Rate Source Patient Position - Orthostatic VS BP Location FiO2 (%)   Oral Monitor Sitting Right arm --      Pain Score       7           Vitals:    06/05/18 2121 06/05/18 2130   BP: 160/67    Pulse: 91 74   Patient Position - Orthostatic VS: Sitting        Visual Acuity      ED Medications  Medications   valACYclovir (VALTREX) tablet 1,000 mg (1,000 mg Oral Given 6/5/18 2210)       Diagnostic Studies  Results Reviewed     None                 No orders to display              Procedures  Procedures       Phone Contacts  ED Phone Contact    ED Course                               MDM  Number of Diagnoses or Management Options  Shingles: new and does not require workup  Diagnosis management comments:   76 yo F presents with rash c/w shingles  Denies f/c or systemic complaints  She is well appearing, nontoxic, afebrile  She was started on valtrex and instructed to f/u with her PCP  Strict RTED precautions were given  Patient and  verbalize understanding and agree with plan  Patient Progress  Patient progress: stable    CritCare Time    Disposition  Final diagnoses:   Shingles     Time reflects when diagnosis was documented in both MDM as applicable and the Disposition within this note     Time User Action Codes Description Comment    6/5/2018  9:50 PM Dennys Robles Add [B02 9] Shingles       ED Disposition     ED Disposition Condition Comment    Discharge  [address] discharge to home/self care  Condition at discharge: Good        Follow-up Information     Follow up With Specialties Details Why Contact Info Additional [address], DO Family Medicine Schedule an appointment as soon as possible for a visit  [address] [facility]    [address] [facility] Emergency Department Emergency Medicine  If symptoms worsen - worsening rash, fevers [address] [facility] [address],  [address], [place] [facility] [place], [address] [facility], [facility]          Discharge Medication List as of 6/5/2018  9:53 PM      START taking these medications    Details   lidocaine (XYLOCAINE) 5 % ointment Apply topically as needed for mild pain, Starting Tue 6/5/2018, Print      valACYclovir (VALTREX) 1,000 mg tablet Take 1 tablet (1,000 mg total) by mouth 3 (three) times a day for 7 days, Starting Tue 6/5/2018, Until Tue 6/12/2018, Print         CONTINUE these medications which have NOT CHANGED    Details   atovaquone (MEPRON) 750 mg/5 mL suspension Take 750 mg by mouth 2 (two) times a day , Until Discontinued, Historical Med      Calcium Carb-Cholecalciferol (CALCIUM 600+D) 600-800 MG-UNIT TABS Take by mouth 2 (two) times a day, Historical Med      citalopram (CeleXA) 10 mg tablet Take 10 mg by mouth daily, Until Discontinued, Historical Med      Cyanocobalamin (HM SUPER VITAMIN B12 PO) Take 1 tablet by mouth daily  , Until Discontinued, Historical Med      cycloSPORINE (RESTASIS) 0 05 % ophthalmic emulsion 1 drop 2 (two) times a day, Until Discontinued, Historical Med      docusate sodium (COLACE) 100 mg capsule Take 100 mg by mouth as needed for constipation  , Until Discontinued, Historical Med      gabapentin (NEURONTIN) 600 MG tablet Take 300 mg by mouth 2 (two) times a day  , Historical Med      Magnesium Cl-Calcium Carbonate (SLOW-MAG PO) Take 138 mg by mouth daily  , Until Discontinued, Historical Med      metoprolol tartrate (LOPRESSOR) 50 mg tablet Take 25 mg by mouth daily  , Historical Med      Multiple Vitamins-Minerals (MULTIPLE VITAMINS/WOMENS PO) Take by mouth daily  , Until Discontinued, Historical Med      mycophenolate (CELLCEPT) 250 mg capsule Take 1,000 mg by mouth 2 (two) times a day  , Historical Med      pantoprazole (PROTONIX) 40 mg tablet Take 40 mg by mouth daily  , Until Discontinued, Historical Med      pravastatin (PRAVACHOL) 40 mg tablet Take 40 mg by mouth daily  , Until Discontinued, Historical Med      predniSONE 5 mg tablet Take 5 mg by mouth daily  , Until Discontinued, Historical Med      risedronate (ACTONEL) 5 MG tablet Take 5 mg by mouth every morning before breakfast with water on empty stomach, nothing by mouth or lie down for next 30 minutes  , Historical Med      Tacrolimus (PROGRAF PO) Take 3 mg by mouth 2 (two) times a day  , Historical Med      traMADol (ULTRAM) 50 mg tablet Take 50 mg by mouth 2 (two) times a day  , Until Discontinued, Historical Med           No discharge procedures on file      ED Provider  Electronically Signed by           Td Marina PA-C  06/05/18 3498

## 2018-06-06 NOTE — DISCHARGE INSTRUCTIONS
Shingles   AMBULATORY CARE:   Shingles  is a painful rash  Shingles is caused by the same virus that causes chickenpox (varicella-zoster virus)  After you get chickenpox, the virus stays in your body for several years without causing any symptoms  Shingles occurs when the virus becomes active again  Once active, the virus will travel along a nerve to your skin and cause a rash  Common signs and symptoms include the following:  Shingles often starts with pain in the back, chest, neck, or face  A rash then develops in the same area  The rash is usually found on only one side of the body  The rash may feel itchy or painful  It starts as red dots that become blisters filled with fluid  The blisters usually grow bigger, become filled with pus, and then crust over after a few days  You may also have any of the following:  · Fatigue and muscle weakness    · Pain when your skin is lightly touched    · Headache    · Fever    · Eye pain when exposed to light  Seek care immediately if:   · You have painful, red, warm skin around the blisters, or the blisters drain pus  · Your neck is stiff or you have trouble moving it  · You have trouble moving your arms, legs, or face  · You have a seizure  · You have weakness in an arm or leg  · You become confused, or have difficulty speaking  · You have dizziness, a severe headache, or hearing or vision loss  Contact your healthcare provider if:   · You feel weak or have a headache  · You have a cough, chills, or a fever  · You have abdominal pain or nausea, or you are vomiting  · Your rash becomes more itchy or painful  · Your rash spreads to other parts of your body  · Your pain worsens and does not go away even after you take medicine  · You have questions or concerns about your condition or care  Medicines:   · Antiviral medicine  helps decrease symptoms and healing time  They may also decrease your risk of developing nerve pain   You will need to start taking them within 3 days of the start of symptoms to prevent nerve pain  · Pain medicine  may be prescribed or suggested by your healthcare provider  You may need NSAIDs, acetaminophen, or opioid medicine depending on how much pain you are in  Do not wait until the pain is severe before you take more pain medicine  · Topical anesthetics  are used to numb the skin and decrease pain  They can be a cream, gel, spray, or patch  · Anticonvulsants  decrease nerve pain and may help you sleep at night  · Antidepressants  may be used to decrease nerve pain  Follow up with your healthcare provider as directed:  Write down your questions so you remember to ask them during your visits  Self-care:  Keep your rash clean and dry  Cover your rash with a bandage or clothing  Do not use bandages that stick to your skin  The sticky part may irritate your skin and make your rash last longer  Prevent the spread of shingles: The virus can be passed to a person who has never had chickenpox  This person may get chickenpox, but not shingles  You may pass the virus to others as long as you have a rash  The virus is spread by direct contact with the fluid from the blisters  Usually, you cannot spread the virus once the blisters dry up  Prevent shingles or another shingles outbreak:  A vaccine may be given to help prevent shingles  Ask for more information about this vaccine  © 2017 [employer] [employer] Information is for End User's use only and may not be sold, redistributed or otherwise used for commercial purposes  All illustrations and images included in CareNotes® are the copyrighted property of A D A M , Inc  or Johnathan York  The above information is an  only  It is not intended as medical advice for individual conditions or treatments  Talk to your doctor, nurse or pharmacist before following any medical regimen to see if it is safe and effective for you

## 2018-06-12 ENCOUNTER — OFFICE VISIT (OUTPATIENT)
Dept: FAMILY MEDICINE CLINIC | Facility: CLINIC | Age: 68
End: 2018-06-12
Payer: MEDICARE

## 2018-06-12 VITALS
WEIGHT: 154.8 LBS | BODY MASS INDEX: 26.43 KG/M2 | TEMPERATURE: 99.4 F | HEIGHT: 64 IN | DIASTOLIC BLOOD PRESSURE: 78 MMHG | SYSTOLIC BLOOD PRESSURE: 124 MMHG | HEART RATE: 74 BPM

## 2018-06-12 DIAGNOSIS — B02.9 HERPES ZOSTER WITHOUT COMPLICATION: ICD-10-CM

## 2018-06-12 DIAGNOSIS — G47.00 INSOMNIA, UNSPECIFIED TYPE: ICD-10-CM

## 2018-06-12 DIAGNOSIS — R19.7 DIARRHEA, UNSPECIFIED TYPE: Primary | ICD-10-CM

## 2018-06-12 PROCEDURE — 99214 OFFICE O/P EST MOD 30 MIN: CPT | Performed by: FAMILY MEDICINE

## 2018-06-12 RX ORDER — VALACYCLOVIR HYDROCHLORIDE 1 G/1
1000 TABLET, FILM COATED ORAL 3 TIMES DAILY
Qty: 9 TABLET | Refills: 0 | Status: SHIPPED | OUTPATIENT
Start: 2018-06-12 | End: 2018-10-17 | Stop reason: ALTCHOICE

## 2018-06-12 RX ORDER — LIDOCAINE 50 MG/G
OINTMENT TOPICAL 3 TIMES DAILY PRN
Qty: 35.44 G | Refills: 0 | Status: SHIPPED | OUTPATIENT
Start: 2018-06-12 | End: 2018-06-22 | Stop reason: SDUPTHER

## 2018-06-12 RX ORDER — CHOLESTYRAMINE 4 G/9G
1 POWDER, FOR SUSPENSION ORAL DAILY
Qty: 30 EACH | Refills: 3 | Status: SHIPPED | OUTPATIENT
Start: 2018-06-12 | End: 2019-04-20 | Stop reason: SDUPTHER

## 2018-06-12 RX ORDER — ZOLPIDEM TARTRATE 5 MG/1
5 TABLET ORAL
Qty: 30 TABLET | Refills: 3 | Status: SHIPPED | OUTPATIENT
Start: 2018-06-12

## 2018-06-12 NOTE — PROGRESS NOTES
Patient ID: Rock Frazier is a 77 y o  female  HPI: 77 y  o female presents for follow up of herpes zoster for which pt was put on a seven day course of valtrex and topical lidocaine  Her discomfort is well controlled at this time  She complains of chonic loose stools first thing in am every day for mos and chronic insomnia  She has significant difficulty falling asleep  SUBJECTIVE    Family History   Problem Relation Age of Onset    Hypertension Mother     Skin cancer Mother     COPD Father     Hypertension Father      ESSENTIAL    Lung cancer Father     Lupus Sister      Social History     Social History    Marital status: /Civil Union     Spouse name: N/A    Number of children: N/A    Years of education: N/A     Occupational History    Not on file       Social History Main Topics    Smoking status: Never Smoker    Smokeless tobacco: Not on file    Alcohol use No    Drug use: No    Sexual activity: Not on file     Other Topics Concern    Not on file     Social History Narrative    DAILY COFFEE CONSUMPTION    DAILY COLA CONSUMPTION    DAILY TEA CONSUMPTION     Past Medical History:   Diagnosis Date    Anxiety     Blood clot of neck vein     R side    H/O pulmonary fibrosis     Pneumonitis     Spinal stenosis      Past Surgical History:   Procedure Laterality Date    APPENDECTOMY      KNEE ARTHROSCOPY      LUNG TRANSPLANT, DOUBLE      Pt had fibrosis and pneumonitis    TONSILLECTOMY       Allergies   Allergen Reactions    Bactrim [Sulfamethoxazole-Trimethoprim] Rash       Current Outpatient Prescriptions:     atovaquone (MEPRON) 750 mg/5 mL suspension, Take 750 mg by mouth 2 (two) times a day , Disp: , Rfl:     Calcium Carb-Cholecalciferol (CALCIUM 600+D) 600-800 MG-UNIT TABS, Take by mouth 2 (two) times a day, Disp: , Rfl:     cholestyramine (QUESTRAN) 4 g packet, Take 1 packet (4 g total) by mouth daily for 30 days, Disp: 30 each, Rfl: 3    citalopram (CeleXA) 10 mg tablet, Take 10 mg by mouth daily, Disp: , Rfl:     Cyanocobalamin (HM SUPER VITAMIN B12 PO), Take 1 tablet by mouth daily  , Disp: , Rfl:     cycloSPORINE (RESTASIS) 0 05 % ophthalmic emulsion, 1 drop 2 (two) times a day, Disp: , Rfl:     docusate sodium (COLACE) 100 mg capsule, Take 100 mg by mouth as needed for constipation  , Disp: , Rfl:     gabapentin (NEURONTIN) 600 MG tablet, Take 300 mg by mouth 2 (two) times a day  , Disp: , Rfl:     lidocaine (XYLOCAINE) 5 % ointment, Apply topically 3 (three) times a day as needed for mild pain, Disp: 35 44 g, Rfl: 0    Magnesium Cl-Calcium Carbonate (SLOW-MAG PO), Take 138 mg by mouth daily  , Disp: , Rfl:     metoprolol tartrate (LOPRESSOR) 50 mg tablet, Take 25 mg by mouth daily  , Disp: , Rfl:     Multiple Vitamins-Minerals (MULTIPLE VITAMINS/WOMENS PO), Take by mouth daily  , Disp: , Rfl:     mycophenolate (CELLCEPT) 250 mg capsule, Take 1,000 mg by mouth 2 (two) times a day  , Disp: , Rfl:     pantoprazole (PROTONIX) 40 mg tablet, Take 40 mg by mouth daily  , Disp: , Rfl:     pravastatin (PRAVACHOL) 40 mg tablet, Take 40 mg by mouth daily  , Disp: , Rfl:     predniSONE 5 mg tablet, Take 5 mg by mouth daily  , Disp: , Rfl:     risedronate (ACTONEL) 5 MG tablet, Take 5 mg by mouth every morning before breakfast with water on empty stomach, nothing by mouth or lie down for next 30 minutes  , Disp: , Rfl:     Tacrolimus (PROGRAF PO), Take 3 mg by mouth 2 (two) times a day  , Disp: , Rfl:     traMADol (ULTRAM) 50 mg tablet, Take 50 mg by mouth 2 (two) times a day  , Disp: , Rfl:     valACYclovir (VALTREX) 1,000 mg tablet, Take 1 tablet (1,000 mg total) by mouth 3 (three) times a day for 7 days, Disp: 21 tablet, Rfl: 0    valACYclovir (VALTREX) 1,000 mg tablet, Take 1 tablet (1,000 mg total) by mouth 3 (three) times a day for 3 days, Disp: 9 tablet, Rfl: 0    zolpidem (AMBIEN) 5 mg tablet, Take 1 tablet (5 mg total) by mouth daily at bedtime as needed for sleep, Disp: 30 tablet, Rfl: 3    Review of Systems  Constitutional:     Denies fever, chills ,fatigue ,weakness ,weight loss, weight gain     ENT: Denies earache ,loss of hearing ,nosebleed, nasal discharge,nasal congestion ,sore throat ,hoarseness  Pulmonary: Denies shortness of breath ,cough  ,dyspnea on exertion, orthopnea  ,PND   Cardiovascular:  Denies bradycardia , tachycardia  ,palpations, lower extremity edema leg, claudication  Breast:  Denies new or changing breast lumps ,nipple discharge ,nipple changes  Abdomen:  Denies abdominal pain , anorexia , indigestion, nausea, vomiting, constipation,+ diarrhea  Musculoskeletal: Denies myalgias, arthralgias, joint swelling, joint stiffness , limb pain, limb swelling  Gu: denies dysuria, polyuria  Skin: Denies skin lesion, skin wound, itching, dry skin + left thorax skin rash with vesicular eruption noted in a dermatomal distribution  Neuro: Denies headache, numbness, tingling, confusion, loss of consciousness, dizziness, vertigo  Psychiatric: Denies feelings of depression, suicidal ideation, anxiety,+ sleep disturbances    OBJECTIVE    Constitutional:   NAD, well appearing and well nourished      ENT:   Conjunctiva and lids: no injection, edema, or discharge     Pupils and iris: DIMA bilaterally    External inspection of ears and nose: normal without deformities or discharge  Otoscopic exam: Canals patent without erythema  Nasal mucosa, septum and turbinates: Normal or edema or discharge         Oropharynx:  Moist mucosa, normal tongue and tonsils without lesions  No erythema        Pulmonary:Respiratory effort normal rate and rhythm, no increased work of breathing   Auscultation of lungs:  Clear bilaterally with no adventitious breath sounds       Cardiovascular: regular rate and rhythm, S1 and S2, no murmur, no edema and/or varicosities of LE      Abdomen: Soft and non-distended     Positive bowel sounds      No heptomegaly or splenomegaly      Gu: no suprapubic tenderness or CVA tenderness, no urethral discharge  Lymphatic:  No anterior or posterior cervical lymphadenopathy         Musculoskeletal:  Gait and station: Normal gait      Digits and nails normal without clubbing or cyanosis       Inspection/palpation of joints, bones, and muscles:  No joint tenderness, swelling, full active and passive range of motion       Skin: Normal skin turgor and no rashes      Neuro:    Normal reflexes     Psych:   alert and oriented to person, place and time     normal mood and affect       Assessment/Plan:Diagnoses and all orders for this visit:    Diarrhea, unspecified type  -     cholestyramine (QUESTRAN) 4 g packet; Take 1 packet (4 g total) by mouth daily for 30 days    Herpes zoster without complication  -     lidocaine (XYLOCAINE) 5 % ointment; Apply topically 3 (three) times a day as needed for mild pain  -     valACYclovir (VALTREX) 1,000 mg tablet; Take 1 tablet (1,000 mg total) by mouth 3 (three) times a day for 3 days    Insomnia, unspecified type  -     zolpidem (AMBIEN) 5 mg tablet; Take 1 tablet (5 mg total) by mouth daily at bedtime as needed for sleep        Reviewed with patient plan to treat with the above       Patient instructed to call in 72 hours if not feeling better or if symptoms worsen

## 2018-06-22 DIAGNOSIS — B02.9 HERPES ZOSTER WITHOUT COMPLICATION: ICD-10-CM

## 2018-06-22 RX ORDER — LIDOCAINE 50 MG/G
OINTMENT TOPICAL 3 TIMES DAILY PRN
Qty: 35.44 G | Refills: 0 | Status: SHIPPED | OUTPATIENT
Start: 2018-06-22 | End: 2018-07-06 | Stop reason: SDUPTHER

## 2018-07-06 DIAGNOSIS — B02.9 HERPES ZOSTER WITHOUT COMPLICATION: ICD-10-CM

## 2018-07-06 RX ORDER — LIDOCAINE 50 MG/G
OINTMENT TOPICAL
Qty: 35.44 G | Refills: 0 | Status: SHIPPED | OUTPATIENT
Start: 2018-07-06 | End: 2018-07-26 | Stop reason: SDUPTHER

## 2018-07-17 ENCOUNTER — OFFICE VISIT (OUTPATIENT)
Dept: HEMATOLOGY ONCOLOGY | Facility: CLINIC | Age: 68
End: 2018-07-17
Payer: MEDICARE

## 2018-07-17 VITALS
BODY MASS INDEX: 26.8 KG/M2 | WEIGHT: 157 LBS | OXYGEN SATURATION: 98 % | HEART RATE: 89 BPM | RESPIRATION RATE: 22 BRPM | SYSTOLIC BLOOD PRESSURE: 158 MMHG | TEMPERATURE: 100.3 F | HEIGHT: 64 IN | DIASTOLIC BLOOD PRESSURE: 64 MMHG

## 2018-07-17 DIAGNOSIS — D75.89 BONE MARROW SUPPRESSION: ICD-10-CM

## 2018-07-17 DIAGNOSIS — N18.9 ANEMIA IN CHRONIC KIDNEY DISEASE, UNSPECIFIED CKD STAGE: Primary | ICD-10-CM

## 2018-07-17 DIAGNOSIS — D63.1 ANEMIA IN CHRONIC KIDNEY DISEASE, UNSPECIFIED CKD STAGE: Primary | ICD-10-CM

## 2018-07-17 DIAGNOSIS — N18.30 STAGE 3 CHRONIC KIDNEY DISEASE (HCC): ICD-10-CM

## 2018-07-17 PROCEDURE — 99214 OFFICE O/P EST MOD 30 MIN: CPT | Performed by: INTERNAL MEDICINE

## 2018-07-17 NOTE — PROGRESS NOTES
HEMATOLOGY / ONCOLOGY CLINIC NOTE    Primary Care Provider: Debra Eid DO  Referring Provider:    MRN: [id number]  : 1950    Reason for Encounter:    Chief Complaint   Patient presents with    Follow-up     patient is here for a follow up visit         Hematology / Oncology History:     Sherley Owen is a 77 y o  female who came in for follow up  Anemia : Normocytic; multifactorial, likely drug (tacrolimus) related , and CKD  repeat work up showed no blood loss or hemolysis  Patient had a bone marrow biopsy in [facility] showed no major abnormalities per patient  Hemoglobin:  6 9 on , 7 2 on     - 2018:  received 4  iv iron Venofer 300 mg iv weekly  ;   Aranesp 100 mcg weekly x 4   - :  Received 1 unit of PRBC transfusion        Interval History:     3/20 :  Patient came in for follow-up  Reported no bleeding  Strength stable  Jose dyspnea while walking a long distance  Has been reported patient still appears pale  Patient stopped following hematology in [facility]  :  Came in for follow-up  Reported doing well, no bleeding  Strengths is weak however stable  Problem list:       Patient Active Problem List   Diagnosis    [facility] status[facility] [address] )    ROSEANNE (acute kidney injury) ([facility] [address] )    Neutropenia, drug-induced (HCC)    Anemia    Right shoulder pain    Spinal stenosis    Stage 3 chronic kidney disease       Assessment / Plan:       1  Anemia in chronic kidney disease, unspecified CKD stage   -  Macrocytic; multifactorial, likely drug (tacrolimus) - related , and CKD  repeat work up in 10/2017 showed no blood loss or hemolysis  -    Iron deficiency is now corrected, however anemia remains the same    Suggesting the anemia is likely due to bone marrow suppression by transplant/tacrolimus      -      Patient is having labs every 2 weeks, I believe this frequent blood draw also significantly contribute to anemia  Patient will discuss with other physicians if possible we can certainly decreased the CBC CMP to every 4 weeks  Plan  -    Follow-up in 3  months  Transfuse if needed in the future  -    May consider rechallange with epo and iron  25    minutes were spent on this visit  All questions answered to satisfaction; Advised pt to call if there is any further questions  PHYSICIAL EXAMINATION:       Vital Signs:   [unfilled]  Body mass index is 26 95 kg/m²  Body surface area is 1 76 meters squared  GEN: Alert, awake oriented x3, in no acute distress;     Wearing mask, appears pale  HEENT- No pallor, icterus, cyanosis, no oral mucosal lesions,   LAD - no palpable cervical, clavicle, axillary, inguinal LAD  Heart- normal S1 S2, regular rate and rhythm, No murmur, rubs  Lungs- clear breathing sound bilateral    Abdomen- soft, Non tender, bowel sounds present  Extremities- No cyanosis, clubbing, edema  Neuro- No focal neurological deficit           PAST MEDICAL HISTORY:   has a past medical history of Anxiety; Blood clot of neck vein; H/O pulmonary fibrosis; Pneumonitis; and Spinal stenosis  PAST SURGICAL HISTORY:   has a past surgical history that includes Lung transplant, double; Appendectomy; Tonsillectomy; and Knee arthroscopy  CURRENT MEDICATIONS:     Current Outpatient Prescriptions   Medication Sig Dispense Refill    atovaquone (MEPRON) 750 mg/5 mL suspension Take 750 mg by mouth 2 (two) times a day   Calcium Carb-Cholecalciferol (CALCIUM 600+D) 600-800 MG-UNIT TABS Take by mouth 2 (two) times a day      citalopram (CeleXA) 10 mg tablet Take 10 mg by mouth daily      Cyanocobalamin (HM SUPER VITAMIN B12 PO) Take 1 tablet by mouth daily   cycloSPORINE (RESTASIS) 0 05 % ophthalmic emulsion 1 drop 2 (two) times a day      docusate sodium (COLACE) 100 mg capsule Take 100 mg by mouth as needed for constipation          gabapentin (NEURONTIN) 600 MG tablet Take 300 mg by mouth 2 (two) times a day        lidocaine (XYLOCAINE) 5 % ointment APPLY EXTERNALLY TO THE AFFECTED AREA THREE TIMES DAILY AS NEEDED FOR MILD PAIN 35 44 g 0    Magnesium Cl-Calcium Carbonate (SLOW-MAG PO) Take 138 mg by mouth daily   metoprolol tartrate (LOPRESSOR) 50 mg tablet Take 25 mg by mouth daily        Multiple Vitamins-Minerals (MULTIPLE VITAMINS/WOMENS PO) Take by mouth daily   mycophenolate (CELLCEPT) 250 mg capsule Take 1,000 mg by mouth 2 (two) times a day        pantoprazole (PROTONIX) 40 mg tablet Take 40 mg by mouth daily   pravastatin (PRAVACHOL) 40 mg tablet Take 40 mg by mouth daily   predniSONE 5 mg tablet Take 5 mg by mouth daily   risedronate (ACTONEL) 5 MG tablet Take 5 mg by mouth every morning before breakfast with water on empty stomach, nothing by mouth or lie down for next 30 minutes   Tacrolimus (PROGRAF PO) Take 3 mg by mouth 2 (two) times a day        traMADol (ULTRAM) 50 mg tablet Take 50 mg by mouth 2 (two) times a day        zolpidem (AMBIEN) 5 mg tablet Take 1 tablet (5 mg total) by mouth daily at bedtime as needed for sleep 30 tablet 3    cholestyramine (QUESTRAN) 4 g packet Take 1 packet (4 g total) by mouth daily for 30 days 30 each 3    valACYclovir (VALTREX) 1,000 mg tablet Take 1 tablet (1,000 mg total) by mouth 3 (three) times a day for 7 days 21 tablet 0    valACYclovir (VALTREX) 1,000 mg tablet Take 1 tablet (1,000 mg total) by mouth 3 (three) times a day for 3 days 9 tablet 0     No current facility-administered medications for this visit  [unfilled]    SOCIAL HISTORY:   reports that she has never smoked  She does not have any smokeless tobacco history on file  She reports that she does not drink alcohol or use drugs  FAMILY HISTORY:  family history includes COPD in her father; Hypertension in her father and mother; Lung cancer in her father; Lupus in her sister; Skin cancer in her mother       ALLERGIES:  is allergic to bactrim [sulfamethoxazole-trimethoprim]  REVIEW OF SYSTEMS:  Please note that a 14-point review of systems was performed to include Constitutional, HEENT, Respiratory, CVS, GI, , Musculoskeletal, Integumentary, Neurologic, Rheumatologic, Endocrinologic, Psychiatric, Lymphatic, and Hematologic/Oncologic systems were reviewed and are negative unless otherwise stated in HPI  Positive and negative findings pertinent to this evaluation are incorporated into the history of present illness  LAB:    Lab Results   Component Value Date    WBC 10 50 (H) 10/03/2017    HGB 8 5 (L) 10/03/2017    HCT 28 4 (L) 10/03/2017     (H) 10/03/2017     10/03/2017       Lab Results   Component Value Date     06/24/2016    K 6 2 (H) 06/24/2016     (H) 06/24/2016    CO2 18 (L) 06/24/2016    ANIONGAP 8 06/24/2016    BUN 42 (H) 06/24/2016    CREATININE 1 57 (H) 06/24/2016    GLUCOSE 133 06/24/2016    CALCIUM 8 3 06/24/2016    AST 27 06/24/2016    ALT 33 06/24/2016    ALKPHOS 93 06/24/2016    PROT 6 5 06/24/2016    BILITOT 0 30 06/24/2016    EGFR 32 8 06/24/2016       IMAGING:    No orders to display     No results found

## 2018-07-26 DIAGNOSIS — B02.9 HERPES ZOSTER WITHOUT COMPLICATION: ICD-10-CM

## 2018-07-27 RX ORDER — LIDOCAINE 50 MG/G
OINTMENT TOPICAL
Qty: 35.44 G | Refills: 0 | Status: SHIPPED | OUTPATIENT
Start: 2018-07-27 | End: 2018-08-30 | Stop reason: SDUPTHER

## 2018-08-30 ENCOUNTER — OFFICE VISIT (OUTPATIENT)
Dept: FAMILY MEDICINE CLINIC | Facility: CLINIC | Age: 68
End: 2018-08-30
Payer: MEDICARE

## 2018-08-30 VITALS
BODY MASS INDEX: 26.53 KG/M2 | HEART RATE: 88 BPM | HEIGHT: 64 IN | SYSTOLIC BLOOD PRESSURE: 112 MMHG | WEIGHT: 155.4 LBS | DIASTOLIC BLOOD PRESSURE: 68 MMHG | TEMPERATURE: 99 F

## 2018-08-30 DIAGNOSIS — B02.9 HERPES ZOSTER WITHOUT COMPLICATION: ICD-10-CM

## 2018-08-30 DIAGNOSIS — J01.90 ACUTE NON-RECURRENT SINUSITIS, UNSPECIFIED LOCATION: Primary | ICD-10-CM

## 2018-08-30 PROCEDURE — 99213 OFFICE O/P EST LOW 20 MIN: CPT | Performed by: FAMILY MEDICINE

## 2018-08-30 RX ORDER — BROMPHENIRAMINE MALEATE, PSEUDOEPHEDRINE HYDROCHLORIDE, AND DEXTROMETHORPHAN HYDROBROMIDE 2; 30; 10 MG/5ML; MG/5ML; MG/5ML
10 SYRUP ORAL 4 TIMES DAILY PRN
Qty: 180 ML | Refills: 0 | Status: SHIPPED | OUTPATIENT
Start: 2018-08-30 | End: 2018-09-04 | Stop reason: SDUPTHER

## 2018-08-30 RX ORDER — AZITHROMYCIN 250 MG/1
TABLET, FILM COATED ORAL
Qty: 6 TABLET | Refills: 0 | Status: SHIPPED | OUTPATIENT
Start: 2018-08-30 | End: 2018-09-03

## 2018-08-30 RX ORDER — LIDOCAINE 50 MG/G
OINTMENT TOPICAL 3 TIMES DAILY PRN
Qty: 35.44 G | Refills: 1 | Status: SHIPPED | OUTPATIENT
Start: 2018-08-30

## 2018-08-31 NOTE — PROGRESS NOTES
Patient ID: Dave Flowers is a 77 y o  female  HPI: 77 y  o female presenting with symptoms of sinus pain,pressure, nasal congestion, pnd dry cough , ear and throat pain  Symptoms x 1 week  SUBJECTIVE    Family History   Problem Relation Age of Onset    Hypertension Mother     Skin cancer Mother     COPD Father     Hypertension Father         ESSENTIAL    Lung cancer Father     Lupus Sister      Social History     Social History    Marital status: /Civil Union     Spouse name: N/A    Number of children: N/A    Years of education: N/A     Occupational History    Not on file  Social History Main Topics    Smoking status: Never Smoker    Smokeless tobacco: Not on file    Alcohol use No    Drug use: No    Sexual activity: Not on file     Other Topics Concern    Not on file     Social History Narrative    DAILY COFFEE CONSUMPTION    DAILY COLA CONSUMPTION    DAILY TEA CONSUMPTION     Past Medical History:   Diagnosis Date    Anxiety     Blood clot of neck vein     R side    H/O pulmonary fibrosis     Pneumonitis     Spinal stenosis      Past Surgical History:   Procedure Laterality Date    APPENDECTOMY      KNEE ARTHROSCOPY      LUNG TRANSPLANT, DOUBLE      Pt had fibrosis and pneumonitis    TONSILLECTOMY       Allergies   Allergen Reactions    Bactrim [Sulfamethoxazole-Trimethoprim] Rash       Current Outpatient Prescriptions:     atovaquone (MEPRON) 750 mg/5 mL suspension, Take 750 mg by mouth 2 (two) times a day , Disp: , Rfl:     Calcium Carb-Cholecalciferol (CALCIUM 600+D) 600-800 MG-UNIT TABS, Take by mouth 2 (two) times a day, Disp: , Rfl:     citalopram (CeleXA) 10 mg tablet, Take 10 mg by mouth daily, Disp: , Rfl:     Cyanocobalamin (HM SUPER VITAMIN B12 PO), Take 1 tablet by mouth daily    , Disp: , Rfl:     cycloSPORINE (RESTASIS) 0 05 % ophthalmic emulsion, 1 drop 2 (two) times a day, Disp: , Rfl:     docusate sodium (COLACE) 100 mg capsule, Take 100 mg by mouth as needed for constipation  , Disp: , Rfl:     gabapentin (NEURONTIN) 600 MG tablet, Take 300 mg by mouth 2 (two) times a day  , Disp: , Rfl:     lidocaine (XYLOCAINE) 5 % ointment, Apply topically 3 (three) times a day as needed for mild pain, Disp: 35 44 g, Rfl: 1    Magnesium Cl-Calcium Carbonate (SLOW-MAG PO), Take 138 mg by mouth daily  , Disp: , Rfl:     metoprolol tartrate (LOPRESSOR) 50 mg tablet, Take 25 mg by mouth daily  , Disp: , Rfl:     Multiple Vitamins-Minerals (MULTIPLE VITAMINS/WOMENS PO), Take by mouth daily  , Disp: , Rfl:     mycophenolate (CELLCEPT) 250 mg capsule, Take 1,000 mg by mouth 2 (two) times a day  , Disp: , Rfl:     pantoprazole (PROTONIX) 40 mg tablet, Take 40 mg by mouth daily  , Disp: , Rfl:     pravastatin (PRAVACHOL) 40 mg tablet, Take 40 mg by mouth daily  , Disp: , Rfl:     predniSONE 5 mg tablet, Take 5 mg by mouth daily  , Disp: , Rfl:     risedronate (ACTONEL) 5 MG tablet, Take 5 mg by mouth every morning before breakfast with water on empty stomach, nothing by mouth or lie down for next 30 minutes  , Disp: , Rfl:     Tacrolimus (PROGRAF PO), Take 3 mg by mouth 2 (two) times a day  , Disp: , Rfl:     traMADol (ULTRAM) 50 mg tablet, Take 50 mg by mouth daily  , Disp: , Rfl:     zolpidem (AMBIEN) 5 mg tablet, Take 1 tablet (5 mg total) by mouth daily at bedtime as needed for sleep, Disp: 30 tablet, Rfl: 3    azithromycin (ZITHROMAX) 250 mg tablet, Take 2 tablets today then 1 tablet daily x 4 days, Disp: 6 tablet, Rfl: 0    brompheniramine-pseudoephedrine-DM 30-2-10 MG/5ML syrup, Take 10 mL by mouth 4 (four) times a day as needed for congestion or cough, Disp: 180 mL, Rfl: 0    cholestyramine (QUESTRAN) 4 g packet, Take 1 packet (4 g total) by mouth daily for 30 days, Disp: 30 each, Rfl: 3    valACYclovir (VALTREX) 1,000 mg tablet, Take 1 tablet (1,000 mg total) by mouth 3 (three) times a day for 7 days, Disp: 21 tablet, Rfl: 0    valACYclovir (VALTREX) 1,000 mg tablet, Take 1 tablet (1,000 mg total) by mouth 3 (three) times a day for 3 days, Disp: 9 tablet, Rfl: 0    Review of Systems  Constitutional:     Denies fever, chills, fatigue, weakness ,weight loss, weight gain      ENT: Denies earache, loss of hearing, nosebleed, nasal discharge,but complains of nasal congestion, sore throat,hoarseness and sinus pain and pressure    Pulmonary: Denies shortness of breath ,cough , dyspnea on exertionon, orthopnea ,+ PND   Cardiovascular:  Denies bradycardia , tachycardia ,palpations, lower extremity, edema leg, claudication  Breast:  Denies new or changing breast lumps,  nipple discharge, nipple changes,  Abdomen:  Denies abdominal pain , anorexia ,indigestion, nausea ,vomiting, constipation , diarrhea  Musculoskeletal: Denies myalgias, arthralgias, joint swelling, joint stiffness ,limb pain, limb swelling  Lymph:+ swollen glands  Gu: no dysuria or urinary frequency  Skin: Denies skin rash, skin lesion, skin wound, itching,dry skin  Neuro: Denies headache, numbness, tingling, confusion, loss of consciousness, dizziness ,vertigo  Psychiatric: Denies feelings of depression, suicidal ideation, anxiety, sleep disturbances    OBJECTIVE  /68   Pulse 88   Temp 99 °F (37 2 °C)   Ht 5' 4" (1 626 m)   Wt 70 5 kg (155 lb 6 4 oz)   LMP  (LMP Unknown)   BMI 26 67 kg/m²   Constitutional:   NAD, well appearing and well nourished      ENT:   Conjunctiva and lids: no injection, edema, or discharge    Pupils and iris: DIMA bilaterally   External inspection of ears and nose: normal without deformities or discharge  Otoscopic exam: Canals patent ; tm are dull, with with erythem and effusions  ENasal mucosa, septum and turbinates: Turbinae injection with discharge   Oropharynx:  Moist mucosa, normal tongue and tonsils without lesions  Erythema and injection  of post pharynx with pnd      Pulmonary:Respiratory effort normal rate and rhythm, no increased work of breathing  Auscultation of lungs:  Clear bilaterally with no adventitious breath sounds       Cardiovascular: regular rate and rhythm, S1 and S2, no murmur, no edema and/or varicosities of LE      Abdomen: Soft and non-distended    Positive bowel sounds    No heptomegaly or splenomegaly    Lymphatic: Anterior  cervical lymphadenopathy         Muscskeletal:  Gait and station: Normal gait     Digits and nails normal without clubbing or cyanosis     Inspection/palpation of joints, bones, and muscles:  No joint tenderness, swelling, full active and passive range of motion      Gu: no suprabubic tenderness, CVA tenderness or urethral discharge  Skin: Normal skin turgor and no rashes    Neuro:    Normal reflexes   Psych:   alert and oriented to person, place and time  normal mood and affec      Assessment/Plan:Diagnoses and all orders for this visit:    Acute non-recurrent sinusitis, unspecified location  -     brompheniramine-pseudoephedrine-DM 30-2-10 MG/5ML syrup; Take 10 mL by mouth 4 (four) times a day as needed for congestion or cough  -     azithromycin (ZITHROMAX) 250 mg tablet; Take 2 tablets today then 1 tablet daily x 4 days    Herpes zoster without complication  -     lidocaine (XYLOCAINE) 5 % ointment; Apply topically 3 (three) times a day as needed for mild pain        Reviewed with patient plan to treat with the above      Patient instructed to call in 72 hours if not feeling better or if symptoms worsen

## 2018-09-04 DIAGNOSIS — J01.90 ACUTE NON-RECURRENT SINUSITIS, UNSPECIFIED LOCATION: ICD-10-CM

## 2018-09-04 RX ORDER — BROMPHENIRAMINE MALEATE, PSEUDOEPHEDRINE HYDROCHLORIDE, AND DEXTROMETHORPHAN HYDROBROMIDE 2; 30; 10 MG/5ML; MG/5ML; MG/5ML
SYRUP ORAL
Qty: 180 ML | Refills: 0 | Status: SHIPPED | OUTPATIENT
Start: 2018-09-04 | End: 2019-08-27 | Stop reason: ALTCHOICE

## 2018-09-21 ENCOUNTER — TELEPHONE (OUTPATIENT)
Dept: FAMILY MEDICINE CLINIC | Facility: CLINIC | Age: 68
End: 2018-09-21

## 2018-09-21 DIAGNOSIS — J01.90 ACUTE NON-RECURRENT SINUSITIS, UNSPECIFIED LOCATION: Primary | ICD-10-CM

## 2018-09-21 RX ORDER — CEPHALEXIN 500 MG/1
500 CAPSULE ORAL EVERY 8 HOURS SCHEDULED
Qty: 30 CAPSULE | Refills: 0 | Status: SHIPPED | OUTPATIENT
Start: 2018-09-21 | End: 2018-10-01

## 2018-10-10 ENCOUNTER — OFFICE VISIT (OUTPATIENT)
Dept: HEMATOLOGY ONCOLOGY | Facility: CLINIC | Age: 68
End: 2018-10-10
Payer: MEDICARE

## 2018-10-10 VITALS
OXYGEN SATURATION: 99 % | TEMPERATURE: 99.1 F | HEART RATE: 81 BPM | BODY MASS INDEX: 26.8 KG/M2 | HEIGHT: 64 IN | RESPIRATION RATE: 16 BRPM | SYSTOLIC BLOOD PRESSURE: 154 MMHG | WEIGHT: 157 LBS | DIASTOLIC BLOOD PRESSURE: 80 MMHG

## 2018-10-10 DIAGNOSIS — D63.1 ANEMIA DUE TO CHRONIC KIDNEY DISEASE, UNSPECIFIED CKD STAGE: Primary | ICD-10-CM

## 2018-10-10 DIAGNOSIS — N18.9 ANEMIA DUE TO CHRONIC KIDNEY DISEASE, UNSPECIFIED CKD STAGE: Primary | ICD-10-CM

## 2018-10-10 PROCEDURE — 99214 OFFICE O/P EST MOD 30 MIN: CPT | Performed by: INTERNAL MEDICINE

## 2018-10-10 NOTE — PROGRESS NOTES
HEMATOLOGY / ONCOLOGY CLINIC NOTE    Primary Care Provider: Mitch Soares DO  Referring Provider:    MRN: [id number]  : 1950    Reason for Encounter:    Chief Complaint   Patient presents with    Follow-up     patient is here for a follow up visit         Hematology / Oncology History:     Johnette Harada is a 77 y o  female who came in for follow up  Anemia : Normocytic; multifactorial, likely drug (tacrolimus) related , and CKD  repeat work up showed no blood loss or hemolysis  Patient had a bone marrow biopsy in [facility] showed no major abnormalities per patient  Hemoglobin:  6 9  (),  7 2   (),  7 4 (2018),   8 1 ()  ,  7 5 (10/8/2018)    - 2018:  received 4  iv iron Venofer 300 mg iv weekly  ;   Aranesp 100 mcg weekly x 4   - :  Received 1 unit of PRBC transfusion  - 10/10 : plan to give feraheme 510 mg iv weekly x 2 , Aranesp 100 mcg weekly x 4      Interval History:     3/20 :  Patient came in for follow-up  Reported no bleeding  Strength stable  Jose dyspnea while walking a long distance  Has been reported patient still appears pale  Patient stopped following hematology in [facility]  :  Came in for follow-up  Reported doing well, no bleeding  Strengths is weak however stable  10/10 : Came in for follow-up  Reported feeling fatigued  No bleeding  No other complaints  Problem list:       Patient Active Problem List   Diagnosis    Lung transplant status, bilateral ([address] )    ROSEANNE (acute kidney injury) ([facility] [address] )    Neutropenia, drug-induced (HCC)    Anemia    Right shoulder pain    Spinal stenosis    Stage 3 chronic kidney disease (HCC)       Assessment / Plan:       1  Anemia in chronic kidney disease, unspecified CKD stage   -  Macrocytic; multifactorial, likely drug (tacrolimus) - related , and CKD  repeat work up in 10/2017 showed no blood loss or hemolysis  Iron panel is normal, no bleeding  Suggesting the anemia is likely due to bone marrow suppression by transplant/tacrolimus      -         Checking labs every 4 weeks now  -            Given history of kidney transplant, will minimize blood transfusion as much as possible  Since hemoglobin is persistently less than 8, patient feels fatigued, will give another round of IV iron and Aranesp  Depend on the response, if remains low, we may continue Aranesp weekly for long-term  Plan  -     Give Feraheme and Aranesp as planned, follow lab result  Monthly  Follow-up in 6 months  25    minutes were spent on this visit  All questions answered to satisfaction; Advised pt to call if there is any further questions  PHYSICIAL EXAMINATION:       Vital Signs:   [unfilled]  Body mass index is 26 95 kg/m²  Body surface area is 1 76 meters squared  GEN: Alert, awake oriented x3, in no acute distress;     Wearing mask, appears pale  HEENT- No pallor, icterus, cyanosis, no oral mucosal lesions,   LAD - no palpable cervical, clavicle, axillary, inguinal LAD  Heart- normal S1 S2, regular rate and rhythm, No murmur, rubs  Lungs- clear breathing sound bilateral    Abdomen- soft, Non tender, bowel sounds present  Extremities- No cyanosis, clubbing, edema  Neuro- No focal neurological deficit           PAST MEDICAL HISTORY:   has a past medical history of Anxiety; Blood clot of neck vein; H/O pulmonary fibrosis; Pneumonitis; and Spinal stenosis  PAST SURGICAL HISTORY:   has a past surgical history that includes Lung transplant, double; Appendectomy; Tonsillectomy; and Knee arthroscopy  CURRENT MEDICATIONS:     Current Outpatient Prescriptions   Medication Sig Dispense Refill    atovaquone (MEPRON) 750 mg/5 mL suspension Take 750 mg by mouth 2 (two) times a day        brompheniramine-pseudoephedrine-DM 30-2-10 MG/5ML syrup TAKE 10 ML BY MOUTH FOUR TIMES DAILY AS NEEDED FOR CONGESTION OR COUGH 180 mL 0    Calcium Carb-Cholecalciferol (CALCIUM 600+D) 600-800 MG-UNIT TABS Take by mouth 2 (two) times a day      cholestyramine (QUESTRAN) 4 g packet Take 1 packet (4 g total) by mouth daily for 30 days 30 each 3    citalopram (CeleXA) 10 mg tablet Take 10 mg by mouth daily      Cyanocobalamin (HM SUPER VITAMIN B12 PO) Take 1 tablet by mouth daily   cycloSPORINE (RESTASIS) 0 05 % ophthalmic emulsion 1 drop 2 (two) times a day      docusate sodium (COLACE) 100 mg capsule Take 100 mg by mouth as needed for constipation   gabapentin (NEURONTIN) 600 MG tablet Take 300 mg by mouth 2 (two) times a day        lidocaine (XYLOCAINE) 5 % ointment Apply topically 3 (three) times a day as needed for mild pain 35 44 g 1    Magnesium Cl-Calcium Carbonate (SLOW-MAG PO) Take 138 mg by mouth daily   metoprolol tartrate (LOPRESSOR) 50 mg tablet Take 25 mg by mouth daily        Multiple Vitamins-Minerals (MULTIPLE VITAMINS/WOMENS PO) Take by mouth daily   mycophenolate (CELLCEPT) 250 mg capsule Take 1,000 mg by mouth 2 (two) times a day        pantoprazole (PROTONIX) 40 mg tablet Take 40 mg by mouth daily   pravastatin (PRAVACHOL) 40 mg tablet Take 40 mg by mouth daily   predniSONE 5 mg tablet Take 5 mg by mouth daily   risedronate (ACTONEL) 5 MG tablet Take 5 mg by mouth every morning before breakfast with water on empty stomach, nothing by mouth or lie down for next 30 minutes        Tacrolimus (PROGRAF PO) Take 3 mg by mouth 2 (two) times a day        traMADol (ULTRAM) 50 mg tablet Take 50 mg by mouth daily        valACYclovir (VALTREX) 1,000 mg tablet Take 1 tablet (1,000 mg total) by mouth 3 (three) times a day for 7 days 21 tablet 0    valACYclovir (VALTREX) 1,000 mg tablet Take 1 tablet (1,000 mg total) by mouth 3 (three) times a day for 3 days 9 tablet 0    zolpidem (AMBIEN) 5 mg tablet Take 1 tablet (5 mg total) by mouth daily at bedtime as needed for sleep 30 tablet 3     No current facility-administered medications for this visit  [unfilled]    SOCIAL HISTORY:   reports that she has never smoked  She has never used smokeless tobacco  She reports that she does not drink alcohol or use drugs  FAMILY HISTORY:  family history includes COPD in her father; Hypertension in her father and mother; Lung cancer in her father; Lupus in her sister; Skin cancer in her mother  ALLERGIES:  is allergic to bactrim [sulfamethoxazole-trimethoprim]  REVIEW OF SYSTEMS:  Please note that a 14-point review of systems was performed to include Constitutional, HEENT, Respiratory, CVS, GI, , Musculoskeletal, Integumentary, Neurologic, Rheumatologic, Endocrinologic, Psychiatric, Lymphatic, and Hematologic/Oncologic systems were reviewed and are negative unless otherwise stated in HPI  Positive and negative findings pertinent to this evaluation are incorporated into the history of present illness  LAB:    Lab Results   Component Value Date    WBC 10 50 (H) 10/03/2017    HGB 8 5 (L) 10/03/2017    HCT 28 4 (L) 10/03/2017     (H) 10/03/2017     10/03/2017       Lab Results   Component Value Date     06/24/2016    K 6 2 (H) 06/24/2016     (H) 06/24/2016    CO2 18 (L) 06/24/2016    ANIONGAP 5 02/16/2015    BUN 42 (H) 06/24/2016    CREATININE 1 57 (H) 06/24/2016    GLUCOSE 115 02/16/2015    CALCIUM 8 3 06/24/2016    AST 27 06/24/2016    ALT 33 06/24/2016    ALKPHOS 93 06/24/2016    PROT 5 8 (L) 06/16/2016    BILITOT 0 2 06/16/2016    EGFR 32 8 06/24/2016       IMAGING:    No orders to display     No results found

## 2018-10-15 ENCOUNTER — HOSPITAL ENCOUNTER (OUTPATIENT)
Dept: INFUSION CENTER | Facility: CLINIC | Age: 68
Discharge: HOME | End: 2018-10-15
Payer: MEDICARE

## 2018-10-15 VITALS
TEMPERATURE: 99.4 F | HEART RATE: 82 BPM | HEIGHT: 64 IN | WEIGHT: 157 LBS | RESPIRATION RATE: 18 BRPM | OXYGEN SATURATION: 99 % | BODY MASS INDEX: 26.8 KG/M2 | SYSTOLIC BLOOD PRESSURE: 112 MMHG | DIASTOLIC BLOOD PRESSURE: 60 MMHG

## 2018-10-15 PROCEDURE — 96365 THER/PROPH/DIAG IV INF INIT: CPT

## 2018-10-15 RX ADMIN — FERUMOXYTOL 510 MG: 510 INJECTION INTRAVENOUS at 11:41

## 2018-10-17 ENCOUNTER — HOSPITAL ENCOUNTER (OUTPATIENT)
Dept: INFUSION CENTER | Facility: CLINIC | Age: 68
Discharge: HOME | End: 2018-10-17
Payer: MEDICARE

## 2018-10-17 VITALS — DIASTOLIC BLOOD PRESSURE: 65 MMHG | HEART RATE: 82 BPM | SYSTOLIC BLOOD PRESSURE: 142 MMHG

## 2018-10-17 PROCEDURE — 96372 THER/PROPH/DIAG INJ SC/IM: CPT

## 2018-10-17 RX ADMIN — DARBEPOETIN ALFA 100 MCG: 100 INJECTION, SOLUTION INTRAVENOUS; SUBCUTANEOUS at 13:35

## 2018-10-17 NOTE — PROGRESS NOTES
Patient here for aranesp and is doing well, no c/o offered  She tolerated injection as ordered to [facility], bandaid applied  She is due to RTO on 10/22/18 for 2nd dose feraheme as previously scheduled

## 2018-10-22 ENCOUNTER — HOSPITAL ENCOUNTER (OUTPATIENT)
Dept: INFUSION CENTER | Facility: CLINIC | Age: 68
Discharge: HOME | End: 2018-10-22
Payer: MEDICARE

## 2018-10-22 VITALS
SYSTOLIC BLOOD PRESSURE: 124 MMHG | RESPIRATION RATE: 18 BRPM | DIASTOLIC BLOOD PRESSURE: 60 MMHG | TEMPERATURE: 98.6 F | HEART RATE: 84 BPM

## 2018-10-22 PROCEDURE — 96365 THER/PROPH/DIAG IV INF INIT: CPT

## 2018-10-22 RX ADMIN — FERUMOXYTOL 510 MG: 510 INJECTION INTRAVENOUS at 11:38

## 2018-10-24 ENCOUNTER — HOSPITAL ENCOUNTER (OUTPATIENT)
Dept: INFUSION CENTER | Facility: CLINIC | Age: 68
Discharge: HOME | End: 2018-10-24
Payer: MEDICARE

## 2018-10-24 PROCEDURE — 96372 THER/PROPH/DIAG INJ SC/IM: CPT

## 2018-10-24 RX ADMIN — DARBEPOETIN ALFA 100 MCG: 100 INJECTION, SOLUTION INTRAVENOUS; SUBCUTANEOUS at 13:17

## 2018-10-24 NOTE — PROGRESS NOTES
Pt resting with no complaints  Blood done monthly at [facility]; most recent Hgb-7 5 from 10/8/18  Aranesp given in [facility] without issue  Given AVS  Pt aware of next appt

## 2018-10-29 ENCOUNTER — TRANSCRIBE ORDERS (OUTPATIENT)
Dept: ADMINISTRATIVE | Facility: HOSPITAL | Age: 68
End: 2018-10-29

## 2018-10-29 DIAGNOSIS — T86.819 COMPLICATION OF TRANSPLANTED LUNG, UNSPECIFIED COMPLICATION (HCC): Primary | ICD-10-CM

## 2018-10-31 ENCOUNTER — HOSPITAL ENCOUNTER (OUTPATIENT)
Dept: INFUSION CENTER | Facility: CLINIC | Age: 68
Discharge: HOME | End: 2018-10-31
Payer: MEDICARE

## 2018-10-31 VITALS — DIASTOLIC BLOOD PRESSURE: 66 MMHG | SYSTOLIC BLOOD PRESSURE: 124 MMHG

## 2018-10-31 PROCEDURE — 96372 THER/PROPH/DIAG INJ SC/IM: CPT

## 2018-10-31 RX ADMIN — DARBEPOETIN ALFA 100 MCG: 100 INJECTION, SOLUTION INTRAVENOUS; SUBCUTANEOUS at 13:21

## 2018-10-31 NOTE — PROGRESS NOTES
Pt resting with no complaints  Aranesp given in [facility] without issue  Given AVS  Pt aware of next appt

## 2018-10-31 NOTE — PLAN OF CARE
Problem: Potential for Falls  Goal: Patient will remain free of falls  INTERVENTIONS:  - Assess patient frequently for physical needs  -  Identify cognitive and physical deficits and behaviors that affect risk of falls    -  [place] fall precautions as indicated by assessment   - Educate patient/family on patient safety including physical limitations  - Instruct patient to call for assistance with activity based on assessment  - Modify environment to reduce risk of injury  - Consider OT/PT consult to assist with strengthening/mobility   Outcome: Progressing

## 2018-11-04 ENCOUNTER — HOSPITAL ENCOUNTER (OUTPATIENT)
Dept: CT IMAGING | Facility: HOSPITAL | Age: 68
Discharge: HOME | End: 2018-11-04
Payer: MEDICARE

## 2018-11-04 DIAGNOSIS — T86.819 COMPLICATION OF TRANSPLANTED LUNG, UNSPECIFIED COMPLICATION (HCC): ICD-10-CM

## 2018-11-04 PROCEDURE — 71250 CT THORAX DX C-: CPT

## 2018-11-07 ENCOUNTER — HOSPITAL ENCOUNTER (OUTPATIENT)
Dept: INFUSION CENTER | Facility: CLINIC | Age: 68
Discharge: HOME | End: 2018-11-07
Payer: MEDICARE

## 2018-11-07 VITALS — SYSTOLIC BLOOD PRESSURE: 118 MMHG | DIASTOLIC BLOOD PRESSURE: 58 MMHG

## 2018-11-07 PROCEDURE — 96372 THER/PROPH/DIAG INJ SC/IM: CPT

## 2018-11-07 RX ADMIN — DARBEPOETIN ALFA 100 MCG: 100 INJECTION, SOLUTION INTRAVENOUS; SUBCUTANEOUS at 14:28

## 2018-11-07 NOTE — PROGRESS NOTES
aranesp given in right arm and patient tolerated it well  Confirmed with patient she is to get her labs monthly and she states she does have slips to do so   avs and schedule given

## 2018-11-07 NOTE — PLAN OF CARE
Problem: Potential for Falls  Goal: Patient will remain free of falls  INTERVENTIONS:  - Assess patient frequently for physical needs  -  Identify cognitive and physical deficits and behaviors that affect risk of falls    -  [place] fall precautions as indicated by assessment   - Educate patient/family on patient safety including physical limitations  - Instruct patient to call for assistance with activity based on assessment  - Modify environment to reduce risk of injury  - Consider OT/PT consult to assist with strengthening/mobility   Outcome: Progressing

## 2018-11-07 NOTE — PROGRESS NOTES
Patient's HGB is 10 1 from labs drawn on 11/5/18  Patient is to get aranesp today for chronic kidney disease  Today is her last one Notified Kristofer Alaniz rn of patient's hgb   She stated patient is ok to get her aranesp today   She stated patient is to get labs drawn monthly as well

## 2018-12-03 ENCOUNTER — CLINICAL SUPPORT (OUTPATIENT)
Dept: FAMILY MEDICINE CLINIC | Facility: CLINIC | Age: 68
End: 2018-12-03
Payer: MEDICARE

## 2018-12-03 DIAGNOSIS — N39.0 URINARY TRACT INFECTION WITHOUT HEMATURIA, SITE UNSPECIFIED: Primary | ICD-10-CM

## 2018-12-03 LAB
SL AMB  POCT GLUCOSE, UA: NEGATIVE
SL AMB LEUKOCYTE ESTERASE,UA: ABNORMAL
SL AMB POCT BILIRUBIN,UA: NEGATIVE
SL AMB POCT BLOOD,UA: ABNORMAL
SL AMB POCT CLARITY,UA: ABNORMAL
SL AMB POCT COLOR,UA: YELLOW
SL AMB POCT KETONES,UA: NEGATIVE
SL AMB POCT NITRITE,UA: POSITIVE
SL AMB POCT PH,UA: 5
SL AMB POCT SPECIFIC GRAVITY,UA: 1.02
SL AMB POCT URINE PROTEIN: ABNORMAL
SL AMB POCT UROBILINOGEN: 0.2

## 2018-12-03 PROCEDURE — 81002 URINALYSIS NONAUTO W/O SCOPE: CPT

## 2018-12-03 RX ORDER — CIPROFLOXACIN 250 MG/1
250 TABLET, FILM COATED ORAL EVERY 12 HOURS SCHEDULED
Qty: 20 TABLET | Refills: 0 | Status: SHIPPED | OUTPATIENT
Start: 2018-12-03 | End: 2018-12-13

## 2019-01-29 ENCOUNTER — OFFICE VISIT (OUTPATIENT)
Dept: PAIN MEDICINE | Facility: CLINIC | Age: 69
End: 2019-01-29
Payer: MEDICARE

## 2019-01-29 VITALS
DIASTOLIC BLOOD PRESSURE: 78 MMHG | HEART RATE: 83 BPM | TEMPERATURE: 97.9 F | RESPIRATION RATE: 16 BRPM | SYSTOLIC BLOOD PRESSURE: 138 MMHG | BODY MASS INDEX: 26.95 KG/M2 | HEIGHT: 64 IN

## 2019-01-29 DIAGNOSIS — M51.16 INTERVERTEBRAL DISC DISORDERS WITH RADICULOPATHY, LUMBAR REGION: ICD-10-CM

## 2019-01-29 DIAGNOSIS — M48.062 SPINAL STENOSIS OF LUMBAR REGION WITH NEUROGENIC CLAUDICATION: Primary | ICD-10-CM

## 2019-01-29 DIAGNOSIS — M47.816 LUMBAR FACET ARTHROPATHY: ICD-10-CM

## 2019-01-29 PROCEDURE — 99214 OFFICE O/P EST MOD 30 MIN: CPT | Performed by: NURSE PRACTITIONER

## 2019-01-29 NOTE — PROGRESS NOTES
Pt c/o back pain that radiates to the hips and legs    Assessment:  1  Spinal stenosis of lumbar region with neurogenic claudication    2  Intervertebral disc disorders with radiculopathy, lumbar region    3  Lumbar facet arthropathy        Plan:  Mustapha Vázquez is a 77 y o  female with a history of lumbar spinal stenosis with neurogenic claudication  The patient presents with ongoing low back pain, which is consistent with her ongoing severe lumbar stenosis at L4-L5  She reports that her pain and symptoms are the same pain and symptoms she experienced prior to undergoing a bilateral L4 transforaminal epidural steroid injection on 5/24/2018 which provided her 4 months relief of symptoms  She is requesting to move forward with an additional bilateral L4 transforaminal epidural steroid injection at this time  She was educated on the procedures most common risks, was given a brochure the procedure  She verbalized understanding, and would like to proceed with the procedure  Therefore, she will be scheduled for an upcoming Tuesday, Thursday or Friday  The patient has a history of bilateral lung transplants 3 5 years ago  She reported that her lung transplant doctor encouraged her to follow up with our office for a repeat injection  Complete risks and benefits including bleeding, infection, tissue reaction, nerve injury and allergic reaction were discussed  The approach was demonstrated using models and literature was provided  The patient will follow up after the completion of her bilateral L4 TFESI or sooner with the worsening of symptoms  My impressions and treatment recommendations were discussed in detail with the patient who verbalized understanding and had no further questions  Discharge instructions were provided  I personally saw and examined the patient and I agree with the above discussed plan of care      Orders Placed This Encounter   Procedures    FL spine and pain procedure Standing Status:   Future     Standing Expiration Date:   1/29/2023     Order Specific Question:   Reason for Exam:     Answer:   Bilateral L4 TFESI     Order Specific Question:   Anticoagulant hold needed? Answer:   No     No orders of the defined types were placed in this encounter  History of Present Illness:  Mariela Hemphill is a 77 y o  female with a history of lumbar spinal stenosis with neurogenic claudication  The patient was last seen office on 5/24/2018 where she underwent bilateral L4 transforaminal epidural steroid injection, which provided her 4 months relief of symptoms  She presents for a follow up office visit in regards to Back Pain; Hip Pain; and Leg Pain  The patients current symptoms include low back pain that radiates down the posterior aspect of her bilateral legs to her feet  She reports bilateral foot numbness  She denies bilateral leg weakness, or bowel or bladder issues  She describes her pain as dull aching, pressure-like, numbness pain that is intermittent nature occurring throughout the day  The patient reports that her pain is symptoms have worsened since last office visit  I have personally reviewed and/or updated the patient's past medical history, past surgical history, family history, social history, current medications, allergies, and vital signs today  Review of Systems   Respiratory: Negative for shortness of breath  Cardiovascular: Negative for chest pain  Gastrointestinal: Negative for constipation, diarrhea, nausea and vomiting  Musculoskeletal: Positive for gait problem  Negative for arthralgias, joint swelling and myalgias  Decreased ROM  Joint stiffness   Skin: Negative for rash  Neurological: Negative for dizziness, seizures and weakness  All other systems reviewed and are negative        Patient Active Problem List   Diagnosis    [facility] status[facility] [address] [facility]    ROSEANNE [facility]acute kidney injury) ([facility] [address] )    Neutropenia, drug-induced ([facility] [address] [facility]    Anemia    Right shoulder pain    Spinal stenosis    Stage 3 chronic kidney disease (HCC)       Past Medical History:   Diagnosis Date    Anxiety     Blood clot of neck vein     R side    H/O pulmonary fibrosis     Pneumonitis     Spinal stenosis        Past Surgical History:   Procedure Laterality Date    APPENDECTOMY      KNEE ARTHROSCOPY      LUNG TRANSPLANT, DOUBLE      Pt had fibrosis and pneumonitis    TONSILLECTOMY         Family History   Problem Relation Age of Onset    Hypertension Mother     Skin cancer Mother    Earna Nelsy COPD Father     Hypertension Father         ESSENTIAL    Lung cancer Father     Lupus Sister        Social History     Occupational History    Not on file  Social History Main Topics    Smoking status: Never Smoker    Smokeless tobacco: Never Used    Alcohol use No    Drug use: No    Sexual activity: Not on file       Current Outpatient Prescriptions on File Prior to Visit   Medication Sig    atovaquone (MEPRON) 750 mg/5 mL suspension Take 750 mg by mouth 2 (two) times a day   brompheniramine-pseudoephedrine-DM 30-2-10 MG/5ML syrup TAKE 10 ML BY MOUTH FOUR TIMES DAILY AS NEEDED FOR CONGESTION OR COUGH    Calcium Carb-Cholecalciferol (CALCIUM 600+D) 600-800 MG-UNIT TABS Take by mouth 2 (two) times a day    citalopram (CeleXA) 10 mg tablet Take 10 mg by mouth daily    Cyanocobalamin (HM SUPER VITAMIN B12 PO) Take 1 tablet by mouth daily   cycloSPORINE (RESTASIS) 0 05 % ophthalmic emulsion 1 drop 2 (two) times a day    docusate sodium (COLACE) 100 mg capsule Take 100 mg by mouth as needed for constipation   gabapentin (NEURONTIN) 600 MG tablet Take 300 mg by mouth 2 (two) times a day      lidocaine (XYLOCAINE) 5 % ointment Apply topically 3 (three) times a day as needed for mild pain    Magnesium Cl-Calcium Carbonate (SLOW-MAG PO) Take 138 mg by mouth daily        metoprolol tartrate (LOPRESSOR) 50 mg tablet Take 25 mg by mouth daily      Multiple Vitamins-Minerals (MULTIPLE VITAMINS/WOMENS PO) Take by mouth daily   mycophenolate (CELLCEPT) 250 mg capsule Take 1,000 mg by mouth 2 (two) times a day      pantoprazole (PROTONIX) 40 mg tablet Take 40 mg by mouth daily   pravastatin (PRAVACHOL) 40 mg tablet Take 40 mg by mouth daily   predniSONE 5 mg tablet Take 5 mg by mouth daily   risedronate (ACTONEL) 5 MG tablet Take 5 mg by mouth every morning before breakfast with water on empty stomach, nothing by mouth or lie down for next 30 minutes   Tacrolimus (PROGRAF PO) Take 3 mg by mouth 2 (two) times a day      traMADol (ULTRAM) 50 mg tablet Take 50 mg by mouth daily      zolpidem (AMBIEN) 5 mg tablet Take 1 tablet (5 mg total) by mouth daily at bedtime as needed for sleep    cholestyramine (QUESTRAN) 4 g packet Take 1 packet (4 g total) by mouth daily for 30 days     No current facility-administered medications on file prior to visit  Allergies   Allergen Reactions    Zofran [Ondansetron] Hallucinations    Bactrim [Sulfamethoxazole-Trimethoprim] Rash       Physical Exam:    /78   Pulse 83   Temp 97 9 °F (36 6 °C)   Resp 16   Ht 5' 4" (1 626 m)   LMP  (LMP Unknown)   BMI 26 95 kg/m²     Constitutional:normal, well developed, well nourished, alert, in no distress and non-toxic and no overt pain behavior   and overweight  Eyes:anicteric  HEENT:grossly intact  Neck:supple, symmetric, trachea midline and no masses   Pulmonary:even and unlabored  Cardiovascular:No edema or pitting edema present  Skin:Normal without rashes or lesions and well hydrated  Psychiatric:Mood and affect appropriate  Neurologic:Cranial Nerves II-XII grossly intact  Musculoskeletal:normal     Lumbar Spine Exam    Appearance:  Normal lordosis  Palpation/Tenderness:  left lumbar paraspinal tenderness  right lumbar paraspinal tenderness  Sensory:  no sensory deficits noted  Range of Motion:  Flexion:  Minimally limited  with pain  Extension:  Minimally limited  with pain  Lateral Flexion - Left:  Minimally limited  with pain  Lateral Flexion - Right:  Minimally limited  with pain  Rotation - Left:  Minimally limited  with pain  Rotation - Right:  Minimally limited  with pain  Motor Strength:  Left hip flexion:  5/5  Right hip flexion:  5/5  Left knee extension:  5/5  Right knee extension:  5/5  Left foot dorsiflexion:  5/5  Left foot plantar flexion:  5/5  Right foot dorsiflexion:  2/5  Right foot plantar flexion:  5/5        Imaging     MRI Lumbar Spine (2/14/2018)  L1-2:  Moderate bilateral facet hypertrophy  Disc osteophyte complex with prominent osteophyte posterior to the L1 vertebral body  Severe spinal canal stenosis      L2-3:  Diffuse disc bulge  Moderate bilateral facet hypertrophy and ligamentum flavum infolding  Severe spinal canal stenosis  Severe bilateral neural foraminal stenosis      L3-4:  Moderate bilateral facet hypertrophy  Severe spinal canal stenosis  Severe right and moderate to severe left neural foraminal stenosis      L4-5:  Moderate bilateral facet hypertrophy  Diffuse disc bulge  Severe spinal canal stenosis      L5-S1:  Diffuse disc bulge  Mild bilateral facet hypertrophy

## 2019-03-14 ENCOUNTER — HOSPITAL ENCOUNTER (OUTPATIENT)
Dept: RADIOLOGY | Facility: CLINIC | Age: 69
Discharge: HOME | End: 2019-03-14
Attending: ANESTHESIOLOGY | Admitting: ANESTHESIOLOGY
Payer: MEDICARE

## 2019-03-14 VITALS
HEART RATE: 71 BPM | DIASTOLIC BLOOD PRESSURE: 71 MMHG | TEMPERATURE: 99 F | OXYGEN SATURATION: 100 % | RESPIRATION RATE: 20 BRPM | SYSTOLIC BLOOD PRESSURE: 115 MMHG

## 2019-03-14 DIAGNOSIS — M48.062 SPINAL STENOSIS OF LUMBAR REGION WITH NEUROGENIC CLAUDICATION: ICD-10-CM

## 2019-03-14 PROCEDURE — 64483 NJX AA&/STRD TFRM EPI L/S 1: CPT | Performed by: ANESTHESIOLOGY

## 2019-03-14 RX ORDER — BUPIVACAINE HCL/PF 2.5 MG/ML
10 VIAL (ML) INJECTION ONCE
Status: COMPLETED | OUTPATIENT
Start: 2019-03-14 | End: 2019-03-14

## 2019-03-14 RX ORDER — METHYLPREDNISOLONE ACETATE 80 MG/ML
80 INJECTION, SUSPENSION INTRA-ARTICULAR; INTRALESIONAL; INTRAMUSCULAR; PARENTERAL; SOFT TISSUE ONCE
Status: COMPLETED | OUTPATIENT
Start: 2019-03-14 | End: 2019-03-14

## 2019-03-14 RX ORDER — 0.9 % SODIUM CHLORIDE 0.9 %
10 VIAL (ML) INJECTION ONCE
Status: COMPLETED | OUTPATIENT
Start: 2019-03-14 | End: 2019-03-14

## 2019-03-14 RX ADMIN — SODIUM CHLORIDE 5 ML: 9 INJECTION, SOLUTION INTRAMUSCULAR; INTRAVENOUS; SUBCUTANEOUS at 09:43

## 2019-03-14 RX ADMIN — Medication 5 ML: at 09:43

## 2019-03-14 RX ADMIN — BUPIVACAINE HYDROCHLORIDE 2 ML: 2.5 INJECTION, SOLUTION EPIDURAL; INFILTRATION; INTRACAUDAL at 09:44

## 2019-03-14 RX ADMIN — IOHEXOL 1 ML: 300 INJECTION, SOLUTION INTRAVENOUS at 09:44

## 2019-03-14 RX ADMIN — METHYLPREDNISOLONE ACETATE 80 MG: 80 INJECTION, SUSPENSION INTRA-ARTICULAR; INTRALESIONAL; INTRAMUSCULAR; PARENTERAL; SOFT TISSUE at 09:44

## 2019-03-14 NOTE — H&P
History of Present Illness: The patient is a 77 y o  female who presents with complaints of lower back and leg pain secondary to spinal stenosis and is here today for bilateral L4 transforaminal epidural steroid injection  Patient Active Problem List   Diagnosis    Lung transplant status, bilateral ([facility] [address] [facility]    ROSEANNE (acute kidney injury) ([facility] [address] )    Neutropenia, drug-induced (HCC)    Anemia    Right shoulder pain    Spinal stenosis    Stage 3 chronic kidney disease (HCC)       Past Medical History:   Diagnosis Date    Anxiety     Blood clot of neck vein     R side    H/O pulmonary fibrosis     Pneumonitis     Spinal stenosis        Past Surgical History:   Procedure Laterality Date    APPENDECTOMY      KNEE ARTHROSCOPY      LUNG TRANSPLANT, DOUBLE      Pt had fibrosis and pneumonitis    TONSILLECTOMY           Current Outpatient Medications:     atovaquone (MEPRON) 750 mg/5 mL suspension, Take 750 mg by mouth 2 (two) times a day , Disp: , Rfl:     brompheniramine-pseudoephedrine-DM 30-2-10 MG/5ML syrup, TAKE 10 ML BY MOUTH FOUR TIMES DAILY AS NEEDED FOR CONGESTION OR COUGH, Disp: 180 mL, Rfl: 0    Calcium Carb-Cholecalciferol (CALCIUM 600+D) 600-800 MG-UNIT TABS, Take by mouth 2 (two) times a day, Disp: , Rfl:     cholestyramine (QUESTRAN) 4 g packet, Take 1 packet (4 g total) by mouth daily for 30 days, Disp: 30 each, Rfl: 3    citalopram (CeleXA) 10 mg tablet, Take 10 mg by mouth daily, Disp: , Rfl:     Cyanocobalamin (HM SUPER VITAMIN B12 PO), Take 1 tablet by mouth daily  , Disp: , Rfl:     cycloSPORINE (RESTASIS) 0 05 % ophthalmic emulsion, 1 drop 2 (two) times a day, Disp: , Rfl:     docusate sodium (COLACE) 100 mg capsule, Take 100 mg by mouth as needed for constipation    , Disp: , Rfl:     gabapentin (NEURONTIN) 600 MG tablet, Take 300 mg by mouth 2 (two) times a day  , Disp: , Rfl:     lidocaine (XYLOCAINE) 5 % ointment, Apply topically 3 (three) times a day as needed for mild pain (Patient not taking: Reported on 3/14/2019), Disp: 35 44 g, Rfl: 1    Magnesium Cl-Calcium Carbonate (SLOW-MAG PO), Take 138 mg by mouth daily  , Disp: , Rfl:     metoprolol tartrate (LOPRESSOR) 50 mg tablet, Take 25 mg by mouth daily  , Disp: , Rfl:     Multiple Vitamins-Minerals (MULTIPLE VITAMINS/WOMENS PO), Take by mouth daily  , Disp: , Rfl:     mycophenolate (CELLCEPT) 250 mg capsule, Take 1,000 mg by mouth 2 (two) times a day  , Disp: , Rfl:     pantoprazole (PROTONIX) 40 mg tablet, Take 40 mg by mouth daily  , Disp: , Rfl:     pravastatin (PRAVACHOL) 40 mg tablet, Take 40 mg by mouth daily  , Disp: , Rfl:     predniSONE 5 mg tablet, Take 5 mg by mouth daily  , Disp: , Rfl:     risedronate (ACTONEL) 5 MG tablet, Take 5 mg by mouth every morning before breakfast with water on empty stomach, nothing by mouth or lie down for next 30 minutes  , Disp: , Rfl:     Tacrolimus (PROGRAF PO), Take 3 mg by mouth 2 (two) times a day  , Disp: , Rfl:     traMADol (ULTRAM) 50 mg tablet, Take 50 mg by mouth daily  , Disp: , Rfl:     zolpidem (AMBIEN) 5 mg tablet, Take 1 tablet (5 mg total) by mouth daily at bedtime as needed for sleep, Disp: 30 tablet, Rfl: 3    Current Facility-Administered Medications:     bupivacaine (PF) (MARCAINE) 0 25 % injection 10 mL, 10 mL, Epidural, Once, Francisco Jeff MD    iohexol (OMNIPAQUE) 300 mg/mL injection 50 mL, 50 mL, Epidural, Once, Francisco Jeff MD    lidocaine (PF) (XYLOCAINE-MPF) 2 % injection 5 mL, 5 mL, Infiltration, Once, Francisco Jeff MD    methylPREDNISolone acetate (DEPO-MEDROL) injection 80 mg, 80 mg, Epidural, Once, Francisco Jeff MD    sodium chloride (PF) 0 9 % injection 10 mL, 10 mL, Infiltration, Once, Francisco Jeff MD    Allergies   Allergen Reactions    Zofran [Ondansetron] Hallucinations    Bactrim [Sulfamethoxazole-Trimethoprim] Rash       Physical Exam:   Vitals:    03/14/19 0931   BP: 108/68   Pulse: 73   Resp: 20   Temp: 99 °F (37 2 °C) SpO2: 100%     General: Awake, Alert, Oriented x 3, Mood and affect appropriate  Respiratory: Respirations even and unlabored  Cardiovascular: Peripheral pulses intact; no edema  Musculoskeletal Exam:   Lower back tenderness    ASA Score: 3    Patient/Chart Verification  Patient ID Verified: Verbal  Consents Confirmed: To be obtained in the Pre-Procedure area  H&P( within 30 days) Verified: To be obtained in the Pre-Procedure area  Allergies Reviewed: Yes  Anticoag/NSAID held?: NA  Currently on antibiotics?: No    Assessment:   1   Spinal stenosis of lumbar region with neurogenic claudication        Plan: Bilateral L4 TFESI

## 2019-03-21 ENCOUNTER — TELEPHONE (OUTPATIENT)
Dept: PAIN MEDICINE | Facility: CLINIC | Age: 69
End: 2019-03-21

## 2019-04-02 ENCOUNTER — OFFICE VISIT (OUTPATIENT)
Dept: PAIN MEDICINE | Facility: CLINIC | Age: 69
End: 2019-04-02
Payer: MEDICARE

## 2019-04-02 VITALS
BODY MASS INDEX: 26.95 KG/M2 | DIASTOLIC BLOOD PRESSURE: 74 MMHG | SYSTOLIC BLOOD PRESSURE: 126 MMHG | RESPIRATION RATE: 18 BRPM | HEART RATE: 76 BPM | HEIGHT: 64 IN

## 2019-04-02 DIAGNOSIS — M47.816 LUMBAR FACET ARTHROPATHY: ICD-10-CM

## 2019-04-02 DIAGNOSIS — M48.062 SPINAL STENOSIS OF LUMBAR REGION WITH NEUROGENIC CLAUDICATION: ICD-10-CM

## 2019-04-02 DIAGNOSIS — M51.16 INTERVERTEBRAL DISC DISORDERS WITH RADICULOPATHY, LUMBAR REGION: ICD-10-CM

## 2019-04-02 DIAGNOSIS — G89.4 CHRONIC PAIN SYNDROME: Primary | ICD-10-CM

## 2019-04-02 PROCEDURE — 99213 OFFICE O/P EST LOW 20 MIN: CPT | Performed by: NURSE PRACTITIONER

## 2019-04-05 ENCOUNTER — EVALUATION (OUTPATIENT)
Dept: PHYSICAL THERAPY | Facility: CLINIC | Age: 69
End: 2019-04-05
Payer: MEDICARE

## 2019-04-05 DIAGNOSIS — M48.062 SPINAL STENOSIS OF LUMBAR REGION WITH NEUROGENIC CLAUDICATION: Primary | ICD-10-CM

## 2019-04-05 DIAGNOSIS — Z12.39 SCREENING FOR BREAST CANCER: Primary | ICD-10-CM

## 2019-04-05 PROCEDURE — 97162 PT EVAL MOD COMPLEX 30 MIN: CPT | Performed by: PHYSICAL THERAPIST

## 2019-04-08 ENCOUNTER — OFFICE VISIT (OUTPATIENT)
Dept: PHYSICAL THERAPY | Facility: CLINIC | Age: 69
End: 2019-04-08
Payer: MEDICARE

## 2019-04-08 DIAGNOSIS — M48.062 SPINAL STENOSIS OF LUMBAR REGION WITH NEUROGENIC CLAUDICATION: Primary | ICD-10-CM

## 2019-04-08 PROCEDURE — 97112 NEUROMUSCULAR REEDUCATION: CPT | Performed by: PHYSICAL THERAPIST

## 2019-04-08 PROCEDURE — 97110 THERAPEUTIC EXERCISES: CPT | Performed by: PHYSICAL THERAPIST

## 2019-04-10 ENCOUNTER — OFFICE VISIT (OUTPATIENT)
Dept: HEMATOLOGY ONCOLOGY | Facility: CLINIC | Age: 69
End: 2019-04-10
Payer: MEDICARE

## 2019-04-10 VITALS
BODY MASS INDEX: 29.45 KG/M2 | HEART RATE: 82 BPM | TEMPERATURE: 99 F | HEIGHT: 64 IN | RESPIRATION RATE: 18 BRPM | WEIGHT: 172.5 LBS | OXYGEN SATURATION: 98 %

## 2019-04-10 DIAGNOSIS — D70.2 NEUTROPENIA, DRUG-INDUCED (HCC): ICD-10-CM

## 2019-04-10 DIAGNOSIS — D50.9 IRON DEFICIENCY ANEMIA, UNSPECIFIED IRON DEFICIENCY ANEMIA TYPE: ICD-10-CM

## 2019-04-10 DIAGNOSIS — N18.9 ANEMIA DUE TO CHRONIC RENAL FAILURE TREATED WITH ERYTHROPOIETIN, UNSPECIFIED CKD STAGE: ICD-10-CM

## 2019-04-10 DIAGNOSIS — N18.9 ANEMIA DUE TO CHRONIC KIDNEY DISEASE, UNSPECIFIED CKD STAGE: Primary | ICD-10-CM

## 2019-04-10 DIAGNOSIS — D63.1 ANEMIA DUE TO CHRONIC RENAL FAILURE TREATED WITH ERYTHROPOIETIN, UNSPECIFIED CKD STAGE: ICD-10-CM

## 2019-04-10 DIAGNOSIS — D63.1 ANEMIA DUE TO CHRONIC KIDNEY DISEASE, UNSPECIFIED CKD STAGE: Primary | ICD-10-CM

## 2019-04-10 PROCEDURE — 99214 OFFICE O/P EST MOD 30 MIN: CPT | Performed by: INTERNAL MEDICINE

## 2019-04-12 ENCOUNTER — OFFICE VISIT (OUTPATIENT)
Dept: PHYSICAL THERAPY | Facility: CLINIC | Age: 69
End: 2019-04-12
Payer: MEDICARE

## 2019-04-12 DIAGNOSIS — M48.062 SPINAL STENOSIS OF LUMBAR REGION WITH NEUROGENIC CLAUDICATION: Primary | ICD-10-CM

## 2019-04-12 PROCEDURE — 97112 NEUROMUSCULAR REEDUCATION: CPT | Performed by: PHYSICAL THERAPIST

## 2019-04-15 ENCOUNTER — OFFICE VISIT (OUTPATIENT)
Dept: PHYSICAL THERAPY | Facility: CLINIC | Age: 69
End: 2019-04-15
Payer: MEDICARE

## 2019-04-15 DIAGNOSIS — M48.062 SPINAL STENOSIS OF LUMBAR REGION WITH NEUROGENIC CLAUDICATION: Primary | ICD-10-CM

## 2019-04-15 PROCEDURE — 97110 THERAPEUTIC EXERCISES: CPT | Performed by: PHYSICAL THERAPIST

## 2019-04-15 PROCEDURE — 97112 NEUROMUSCULAR REEDUCATION: CPT | Performed by: PHYSICAL THERAPIST

## 2019-04-15 PROCEDURE — 97140 MANUAL THERAPY 1/> REGIONS: CPT | Performed by: PHYSICAL THERAPIST

## 2019-04-19 ENCOUNTER — OFFICE VISIT (OUTPATIENT)
Dept: PHYSICAL THERAPY | Facility: CLINIC | Age: 69
End: 2019-04-19
Payer: MEDICARE

## 2019-04-19 DIAGNOSIS — M48.062 SPINAL STENOSIS OF LUMBAR REGION WITH NEUROGENIC CLAUDICATION: Primary | ICD-10-CM

## 2019-04-19 PROCEDURE — 97140 MANUAL THERAPY 1/> REGIONS: CPT | Performed by: PHYSICAL THERAPIST

## 2019-04-19 PROCEDURE — 97112 NEUROMUSCULAR REEDUCATION: CPT | Performed by: PHYSICAL THERAPIST

## 2019-04-20 DIAGNOSIS — R19.7 DIARRHEA, UNSPECIFIED TYPE: ICD-10-CM

## 2019-04-22 RX ORDER — CHOLESTYRAMINE 4 G/9G
POWDER, FOR SUSPENSION ORAL
Qty: 90 PACKET | Refills: 3 | Status: SHIPPED | OUTPATIENT
Start: 2019-04-22 | End: 2020-06-19 | Stop reason: ALTCHOICE

## 2019-04-23 ENCOUNTER — OFFICE VISIT (OUTPATIENT)
Dept: PHYSICAL THERAPY | Facility: CLINIC | Age: 69
End: 2019-04-23
Payer: MEDICARE

## 2019-04-23 DIAGNOSIS — M48.062 SPINAL STENOSIS OF LUMBAR REGION WITH NEUROGENIC CLAUDICATION: Primary | ICD-10-CM

## 2019-04-23 PROCEDURE — 97116 GAIT TRAINING THERAPY: CPT | Performed by: PHYSICAL THERAPIST

## 2019-04-23 PROCEDURE — 97112 NEUROMUSCULAR REEDUCATION: CPT | Performed by: PHYSICAL THERAPIST

## 2019-04-23 PROCEDURE — 97110 THERAPEUTIC EXERCISES: CPT | Performed by: PHYSICAL THERAPIST

## 2019-04-24 ENCOUNTER — HOSPITAL ENCOUNTER (OUTPATIENT)
Dept: INFUSION CENTER | Facility: CLINIC | Age: 69
Discharge: HOME | End: 2019-04-24
Payer: MEDICARE

## 2019-04-24 VITALS
RESPIRATION RATE: 20 BRPM | TEMPERATURE: 97.6 F | DIASTOLIC BLOOD PRESSURE: 69 MMHG | SYSTOLIC BLOOD PRESSURE: 144 MMHG | HEART RATE: 77 BPM

## 2019-04-24 PROCEDURE — 96365 THER/PROPH/DIAG IV INF INIT: CPT

## 2019-04-24 RX ADMIN — FERUMOXYTOL 510 MG: 510 INJECTION INTRAVENOUS at 12:40

## 2019-04-24 NOTE — PROGRESS NOTES
Patient here for feraheme and tolerated infusion without incident  15 min post dosing no s/s lydia's noted  Patient discharged and will RTO 5/8/19 for aranesp and 5/22/19 for feraheme as previously scheduled  AVS given

## 2019-04-26 ENCOUNTER — OFFICE VISIT (OUTPATIENT)
Dept: PHYSICAL THERAPY | Facility: CLINIC | Age: 69
End: 2019-04-26
Payer: MEDICARE

## 2019-04-26 DIAGNOSIS — M48.062 SPINAL STENOSIS OF LUMBAR REGION WITH NEUROGENIC CLAUDICATION: Primary | ICD-10-CM

## 2019-04-26 PROCEDURE — 97110 THERAPEUTIC EXERCISES: CPT | Performed by: PHYSICAL THERAPIST

## 2019-04-26 PROCEDURE — 97112 NEUROMUSCULAR REEDUCATION: CPT | Performed by: PHYSICAL THERAPIST

## 2019-04-30 ENCOUNTER — OFFICE VISIT (OUTPATIENT)
Dept: PHYSICAL THERAPY | Facility: CLINIC | Age: 69
End: 2019-04-30
Payer: MEDICARE

## 2019-04-30 DIAGNOSIS — M48.062 SPINAL STENOSIS OF LUMBAR REGION WITH NEUROGENIC CLAUDICATION: Primary | ICD-10-CM

## 2019-04-30 DIAGNOSIS — N18.30 STAGE 3 CHRONIC KIDNEY DISEASE (HCC): ICD-10-CM

## 2019-04-30 PROCEDURE — 97110 THERAPEUTIC EXERCISES: CPT | Performed by: PHYSICAL THERAPIST

## 2019-04-30 PROCEDURE — 97112 NEUROMUSCULAR REEDUCATION: CPT | Performed by: PHYSICAL THERAPIST

## 2019-04-30 PROCEDURE — 97140 MANUAL THERAPY 1/> REGIONS: CPT | Performed by: PHYSICAL THERAPIST

## 2019-04-30 RX ORDER — SODIUM CHLORIDE 9 MG/ML
20 INJECTION, SOLUTION INTRAVENOUS ONCE
Status: CANCELLED | OUTPATIENT
Start: 2019-05-22

## 2019-05-03 ENCOUNTER — EVALUATION (OUTPATIENT)
Dept: PHYSICAL THERAPY | Facility: CLINIC | Age: 69
End: 2019-05-03
Payer: MEDICARE

## 2019-05-03 DIAGNOSIS — M48.062 SPINAL STENOSIS OF LUMBAR REGION WITH NEUROGENIC CLAUDICATION: Primary | ICD-10-CM

## 2019-05-03 PROCEDURE — 97110 THERAPEUTIC EXERCISES: CPT | Performed by: PHYSICAL THERAPIST

## 2019-05-03 PROCEDURE — 97112 NEUROMUSCULAR REEDUCATION: CPT | Performed by: PHYSICAL THERAPIST

## 2019-05-06 ENCOUNTER — OFFICE VISIT (OUTPATIENT)
Dept: PHYSICAL THERAPY | Facility: CLINIC | Age: 69
End: 2019-05-06
Payer: MEDICARE

## 2019-05-06 DIAGNOSIS — M48.062 SPINAL STENOSIS OF LUMBAR REGION WITH NEUROGENIC CLAUDICATION: Primary | ICD-10-CM

## 2019-05-06 PROCEDURE — 97112 NEUROMUSCULAR REEDUCATION: CPT | Performed by: PHYSICAL THERAPIST

## 2019-05-06 PROCEDURE — 97140 MANUAL THERAPY 1/> REGIONS: CPT | Performed by: PHYSICAL THERAPIST

## 2019-05-08 ENCOUNTER — HOSPITAL ENCOUNTER (OUTPATIENT)
Dept: INFUSION CENTER | Facility: CLINIC | Age: 69
Discharge: HOME | End: 2019-05-08
Payer: MEDICARE

## 2019-05-08 VITALS — SYSTOLIC BLOOD PRESSURE: 109 MMHG | DIASTOLIC BLOOD PRESSURE: 63 MMHG | HEART RATE: 97 BPM

## 2019-05-08 PROCEDURE — 96372 THER/PROPH/DIAG INJ SC/IM: CPT

## 2019-05-08 RX ADMIN — DARBEPOETIN ALFA 100 MCG: 100 INJECTION, SOLUTION INTRAVENOUS; SUBCUTANEOUS at 13:03

## 2019-05-10 ENCOUNTER — OFFICE VISIT (OUTPATIENT)
Dept: PHYSICAL THERAPY | Facility: CLINIC | Age: 69
End: 2019-05-10
Payer: MEDICARE

## 2019-05-10 DIAGNOSIS — M48.062 SPINAL STENOSIS OF LUMBAR REGION WITH NEUROGENIC CLAUDICATION: Primary | ICD-10-CM

## 2019-05-10 PROCEDURE — 97110 THERAPEUTIC EXERCISES: CPT | Performed by: PHYSICAL THERAPIST

## 2019-05-10 PROCEDURE — 97112 NEUROMUSCULAR REEDUCATION: CPT | Performed by: PHYSICAL THERAPIST

## 2019-05-13 ENCOUNTER — APPOINTMENT (OUTPATIENT)
Dept: PHYSICAL THERAPY | Facility: CLINIC | Age: 69
End: 2019-05-13
Payer: MEDICARE

## 2019-05-17 ENCOUNTER — OFFICE VISIT (OUTPATIENT)
Dept: PHYSICAL THERAPY | Facility: CLINIC | Age: 69
End: 2019-05-17
Payer: MEDICARE

## 2019-05-17 DIAGNOSIS — M48.062 SPINAL STENOSIS OF LUMBAR REGION WITH NEUROGENIC CLAUDICATION: Primary | ICD-10-CM

## 2019-05-17 PROCEDURE — 97112 NEUROMUSCULAR REEDUCATION: CPT | Performed by: PHYSICAL THERAPIST

## 2019-05-17 PROCEDURE — 97110 THERAPEUTIC EXERCISES: CPT | Performed by: PHYSICAL THERAPIST

## 2019-05-21 ENCOUNTER — OFFICE VISIT (OUTPATIENT)
Dept: PHYSICAL THERAPY | Facility: CLINIC | Age: 69
End: 2019-05-21
Payer: MEDICARE

## 2019-05-21 DIAGNOSIS — M48.062 SPINAL STENOSIS OF LUMBAR REGION WITH NEUROGENIC CLAUDICATION: Primary | ICD-10-CM

## 2019-05-21 PROCEDURE — 97110 THERAPEUTIC EXERCISES: CPT | Performed by: PHYSICAL THERAPIST

## 2019-05-21 PROCEDURE — 97116 GAIT TRAINING THERAPY: CPT | Performed by: PHYSICAL THERAPIST

## 2019-05-21 PROCEDURE — 97112 NEUROMUSCULAR REEDUCATION: CPT | Performed by: PHYSICAL THERAPIST

## 2019-05-22 ENCOUNTER — HOSPITAL ENCOUNTER (OUTPATIENT)
Dept: INFUSION CENTER | Facility: CLINIC | Age: 69
Discharge: HOME | End: 2019-05-22
Payer: MEDICARE

## 2019-05-22 VITALS
SYSTOLIC BLOOD PRESSURE: 137 MMHG | DIASTOLIC BLOOD PRESSURE: 66 MMHG | RESPIRATION RATE: 20 BRPM | TEMPERATURE: 96.4 F | HEART RATE: 100 BPM

## 2019-05-22 DIAGNOSIS — N18.30 STAGE 3 CHRONIC KIDNEY DISEASE (HCC): Primary | ICD-10-CM

## 2019-05-22 PROCEDURE — 96365 THER/PROPH/DIAG IV INF INIT: CPT

## 2019-05-22 RX ORDER — SODIUM CHLORIDE 9 MG/ML
20 INJECTION, SOLUTION INTRAVENOUS ONCE
Status: CANCELLED | OUTPATIENT
Start: 2019-06-05

## 2019-05-22 RX ORDER — SODIUM CHLORIDE 9 MG/ML
20 INJECTION, SOLUTION INTRAVENOUS ONCE
Status: COMPLETED | OUTPATIENT
Start: 2019-05-22 | End: 2019-05-22

## 2019-05-22 RX ADMIN — SODIUM CHLORIDE 20 ML/HR: 0.9 INJECTION, SOLUTION INTRAVENOUS at 11:20

## 2019-05-22 RX ADMIN — FERUMOXYTOL 510 MG: 510 INJECTION INTRAVENOUS at 11:41

## 2019-05-22 NOTE — PROGRESS NOTES
Pt  Tolerated treatment & observed x 30 min post infusion w/out adverse reaction  Confirmed pts  next appt

## 2019-05-23 ENCOUNTER — CONSULT (OUTPATIENT)
Dept: NEUROLOGY | Facility: CLINIC | Age: 69
End: 2019-05-23
Payer: MEDICARE

## 2019-05-23 VITALS — BODY MASS INDEX: 29.35 KG/M2 | SYSTOLIC BLOOD PRESSURE: 140 MMHG | WEIGHT: 171 LBS | DIASTOLIC BLOOD PRESSURE: 82 MMHG

## 2019-05-23 DIAGNOSIS — R25.1 TREMOR: Primary | ICD-10-CM

## 2019-05-23 PROCEDURE — 99204 OFFICE O/P NEW MOD 45 MIN: CPT | Performed by: PSYCHIATRY & NEUROLOGY

## 2019-05-23 RX ORDER — ERGOCALCIFEROL (VITAMIN D2) 50 MCG
CAPSULE ORAL DAILY
COMMUNITY

## 2019-05-24 ENCOUNTER — OFFICE VISIT (OUTPATIENT)
Dept: PHYSICAL THERAPY | Facility: CLINIC | Age: 69
End: 2019-05-24
Payer: MEDICARE

## 2019-05-24 DIAGNOSIS — M48.062 SPINAL STENOSIS OF LUMBAR REGION WITH NEUROGENIC CLAUDICATION: Primary | ICD-10-CM

## 2019-05-24 PROCEDURE — 97116 GAIT TRAINING THERAPY: CPT

## 2019-05-24 PROCEDURE — 97110 THERAPEUTIC EXERCISES: CPT

## 2019-05-24 PROCEDURE — 97112 NEUROMUSCULAR REEDUCATION: CPT

## 2019-05-31 ENCOUNTER — OFFICE VISIT (OUTPATIENT)
Dept: URGENT CARE | Facility: CLINIC | Age: 69
End: 2019-05-31
Payer: MEDICARE

## 2019-05-31 VITALS
HEIGHT: 64 IN | WEIGHT: 170 LBS | RESPIRATION RATE: 16 BRPM | OXYGEN SATURATION: 98 % | HEART RATE: 74 BPM | SYSTOLIC BLOOD PRESSURE: 132 MMHG | DIASTOLIC BLOOD PRESSURE: 67 MMHG | BODY MASS INDEX: 29.02 KG/M2 | TEMPERATURE: 97.4 F

## 2019-05-31 DIAGNOSIS — L03.114 CELLULITIS OF LEFT UPPER EXTREMITY: Primary | ICD-10-CM

## 2019-05-31 PROCEDURE — 99213 OFFICE O/P EST LOW 20 MIN: CPT | Performed by: FAMILY MEDICINE

## 2019-05-31 PROCEDURE — G0463 HOSPITAL OUTPT CLINIC VISIT: HCPCS | Performed by: FAMILY MEDICINE

## 2019-05-31 RX ORDER — CLINDAMYCIN HYDROCHLORIDE 300 MG/1
300 CAPSULE ORAL 4 TIMES DAILY
Qty: 40 CAPSULE | Refills: 0 | Status: SHIPPED | OUTPATIENT
Start: 2019-05-31 | End: 2019-06-10

## 2019-06-03 ENCOUNTER — EVALUATION (OUTPATIENT)
Dept: PHYSICAL THERAPY | Facility: CLINIC | Age: 69
End: 2019-06-03
Payer: MEDICARE

## 2019-06-03 DIAGNOSIS — M48.062 SPINAL STENOSIS OF LUMBAR REGION WITH NEUROGENIC CLAUDICATION: Primary | ICD-10-CM

## 2019-06-03 PROCEDURE — 97112 NEUROMUSCULAR REEDUCATION: CPT | Performed by: PHYSICAL THERAPIST

## 2019-06-04 ENCOUNTER — HOSPITAL ENCOUNTER (OUTPATIENT)
Dept: INFUSION CENTER | Facility: CLINIC | Age: 69
Discharge: HOME | End: 2019-06-04
Payer: MEDICARE

## 2019-06-04 DIAGNOSIS — N18.30 STAGE 3 CHRONIC KIDNEY DISEASE (HCC): Primary | ICD-10-CM

## 2019-06-04 PROCEDURE — 96372 THER/PROPH/DIAG INJ SC/IM: CPT

## 2019-06-04 RX ORDER — SODIUM CHLORIDE 9 MG/ML
20 INJECTION, SOLUTION INTRAVENOUS ONCE
Status: CANCELLED | OUTPATIENT
Start: 2019-06-19

## 2019-06-04 RX ADMIN — DARBEPOETIN ALFA 100 MCG: 100 INJECTION, SOLUTION INTRAVENOUS; SUBCUTANEOUS at 14:29

## 2019-06-19 ENCOUNTER — HOSPITAL ENCOUNTER (OUTPATIENT)
Dept: INFUSION CENTER | Facility: CLINIC | Age: 69
Discharge: HOME | End: 2019-06-19
Payer: MEDICARE

## 2019-06-19 VITALS
SYSTOLIC BLOOD PRESSURE: 137 MMHG | DIASTOLIC BLOOD PRESSURE: 73 MMHG | TEMPERATURE: 97.7 F | RESPIRATION RATE: 22 BRPM | HEART RATE: 79 BPM

## 2019-06-19 DIAGNOSIS — N18.30 STAGE 3 CHRONIC KIDNEY DISEASE (HCC): Primary | ICD-10-CM

## 2019-06-19 PROCEDURE — 96365 THER/PROPH/DIAG IV INF INIT: CPT

## 2019-06-19 RX ORDER — SODIUM CHLORIDE 9 MG/ML
20 INJECTION, SOLUTION INTRAVENOUS ONCE
Status: COMPLETED | OUTPATIENT
Start: 2019-06-19 | End: 2019-06-19

## 2019-06-19 RX ORDER — SODIUM CHLORIDE 9 MG/ML
20 INJECTION, SOLUTION INTRAVENOUS ONCE
Status: CANCELLED | OUTPATIENT
Start: 2019-07-03

## 2019-06-19 RX ADMIN — FERUMOXYTOL 510 MG: 510 INJECTION INTRAVENOUS at 09:02

## 2019-06-19 RX ADMIN — SODIUM CHLORIDE 20 ML/HR: 0.9 INJECTION, SOLUTION INTRAVENOUS at 08:43

## 2019-06-19 NOTE — PROGRESS NOTES
Patient was here for feraheme and is doing well today, she offers no c/o  She tolerated infusion without incident and was discharged post   She will RTO 7/3/19 for aranesp as previously scheduled  AVS given

## 2019-06-19 NOTE — PATIENT INSTRUCTIONS
June 2019 Sunday Monday Tuesday Wednesday Thursday Friday Saturday                                 1                2     3    [id number]  11:30 AM   (45 min )   Beau Flores, PT   Physical Therapy at [facility] 4    INF COLONY STIMULATING FACTORS   2:00 PM   (30 min )   AN INF QUICK CHAIR   St  [address] [place] [address] 5     6     7     8         Cycle 1, Treatment 2       9     10     11     12     13     14     15                16     17     18     19    INF IV MED IRON OTHER   8:30 AM   (60 min )   AN INF CHAIR [address] 20     21     22          Cycle 1, Treatment 3      23     24     25     26     27     28     29                30                                                   Treatment Details       6/4/2019 - Cycle 1, Treatment 2      Supportive Care: [facility] PROVIDER COMMUNICATION4, ferumoxytol Shweta Bermudez), darbepoetin pita (ARANESP)    6/19/2019 - Cycle 1, Treatment 3      Supportive Care: [facility] PROVIDER COMMUNICATION4, ferumoxytol Shweta Bermudez), darbepoetin pita Delmiduglas Lovell)        July 2019 Sunday Monday Tuesday Wednesday Thursday Friday Saturday        1     2     3    INF COLONY STIMULATING FACTORS   1:00 PM   (30 min )   AN INF QUICK CHAIR   [address] 4     5     6                7     8     9     10     11     12     13                14     15     16     17    INF IV MED IRON OTHER  11:30 AM   (60 min )   AN INF CHAIR [address] 18     19     20                21     22     23     24    FOLLOW UP PG   3:15 PM   (30 min )   Froilan Shen MD PhD   [facility] [address]     31     32                28     29     30     31    INF COLONY STIMULATING FACTORS   2:00 PM   (30 min )   AN INF QUICK CHAIR [address]

## 2019-07-03 ENCOUNTER — HOSPITAL ENCOUNTER (OUTPATIENT)
Dept: INFUSION CENTER | Facility: CLINIC | Age: 69
Discharge: HOME | End: 2019-07-03
Payer: MEDICARE

## 2019-07-03 DIAGNOSIS — N18.30 STAGE 3 CHRONIC KIDNEY DISEASE (HCC): Primary | ICD-10-CM

## 2019-07-03 PROCEDURE — 96372 THER/PROPH/DIAG INJ SC/IM: CPT

## 2019-07-03 RX ORDER — SODIUM CHLORIDE 9 MG/ML
20 INJECTION, SOLUTION INTRAVENOUS ONCE
Status: CANCELLED | OUTPATIENT
Start: 2019-07-17

## 2019-07-03 RX ADMIN — DARBEPOETIN ALFA 100 MCG: 100 INJECTION, SOLUTION INTRAVENOUS; SUBCUTANEOUS at 13:07

## 2019-07-03 NOTE — PROGRESS NOTES
Pt resting with no complaints  Hgb from 6/17 10 8  Pt gets bloodwork monthly  Aranesp given in MARITZA without issue  Given AVS  Pt aware of next appt

## 2019-07-17 ENCOUNTER — HOSPITAL ENCOUNTER (OUTPATIENT)
Dept: INFUSION CENTER | Facility: CLINIC | Age: 69
Discharge: HOME | End: 2019-07-17
Payer: MEDICARE

## 2019-07-17 DIAGNOSIS — N18.30 STAGE 3 CHRONIC KIDNEY DISEASE (HCC): Primary | ICD-10-CM

## 2019-07-17 PROCEDURE — 96365 THER/PROPH/DIAG IV INF INIT: CPT

## 2019-07-17 RX ORDER — SODIUM CHLORIDE 9 MG/ML
20 INJECTION, SOLUTION INTRAVENOUS ONCE
Status: CANCELLED | OUTPATIENT
Start: 2019-08-14

## 2019-07-17 RX ORDER — SODIUM CHLORIDE 9 MG/ML
20 INJECTION, SOLUTION INTRAVENOUS ONCE
Status: DISCONTINUED | OUTPATIENT
Start: 2019-07-17 | End: 2019-07-20 | Stop reason: HOSPADM

## 2019-07-17 RX ADMIN — FERUMOXYTOL 510 MG: 510 INJECTION INTRAVENOUS at 12:03

## 2019-07-24 ENCOUNTER — OFFICE VISIT (OUTPATIENT)
Dept: HEMATOLOGY ONCOLOGY | Facility: CLINIC | Age: 69
End: 2019-07-24
Payer: MEDICARE

## 2019-07-24 VITALS
TEMPERATURE: 97.4 F | BODY MASS INDEX: 29.02 KG/M2 | SYSTOLIC BLOOD PRESSURE: 110 MMHG | OXYGEN SATURATION: 97 % | HEART RATE: 77 BPM | HEIGHT: 64 IN | DIASTOLIC BLOOD PRESSURE: 70 MMHG | WEIGHT: 170 LBS | RESPIRATION RATE: 16 BRPM

## 2019-07-24 DIAGNOSIS — N18.30 STAGE 3 CHRONIC KIDNEY DISEASE (HCC): ICD-10-CM

## 2019-07-24 DIAGNOSIS — N18.9 ANEMIA DUE TO CHRONIC KIDNEY DISEASE, UNSPECIFIED CKD STAGE: Primary | ICD-10-CM

## 2019-07-24 DIAGNOSIS — D63.1 ANEMIA DUE TO CHRONIC KIDNEY DISEASE, UNSPECIFIED CKD STAGE: Primary | ICD-10-CM

## 2019-07-24 PROCEDURE — 99214 OFFICE O/P EST MOD 30 MIN: CPT | Performed by: INTERNAL MEDICINE

## 2019-07-24 NOTE — PROGRESS NOTES
HEMATOLOGY / ONCOLOGY CLINIC NOTE    Primary Care Provider: Lauren Cabezas DO  Referring Provider: Carrie Sy  MRN: [id number]  : 1950    Reason for Encounter:    Chief Complaint   Patient presents with   Adam Jose Follow-up         Hematology / Oncology History:     Nitesh Herrera is a 68 y o  female who came in for follow up  Anemia : Normocytic; multifactorial, likely drug (tacrolimus) related , and CKD  repeat work up showed no blood loss or hemolysis  Patient had a bone marrow biopsy in [place] [address] showed no major abnormalities per patient  Hemoglobin:  6 9  (),  7 2   (),  7 4 (2018),   8 1 ()  ,  7 5 (10/8/2018)    - 2018:  received 4  iv iron Venofer 300 mg iv weekly  ;   Aranesp 100 mcg weekly x 4   - :  Received 1 unit of PRBC transfusion  - 10/10 : plan to give feraheme 510 mg iv weekly x 2 , Aranesp 100 mcg weekly x 4      - 2019:  Plan to start Feraheme 500 mg IV every 4 weeks, Aranesp 100 mics subcu injection every 4 weeks  - current treatment:  As above      Interval History:     3/20 :  Patient came in for follow-up  Reported no bleeding  Strength stable  Jose dyspnea while walking a long distance  Has been reported patient still appears pale  Patient stopped following hematology in [facility]  :  Came in for follow-up  Reported doing well, no bleeding  Strengths is weak however stable  10/10 : Came in for follow-up  Reported feeling fatigued  No bleeding  No other complaints  4/10/2019:  Patient reported overall has been doing well  She feels the energy has been increasing  No bleeding  Reported her tremor has been getting worse, she scheduled to see a neurologist     2019:  Emanuel Munoz for follow-up  Reported doing well  Reported still having fatigue  No bleeding no other issues  Following transplant doctor, considered to further lower dose of tacrolimus          Problem list: Patient Active Problem List   Diagnosis    [facility] status, [facility] [address] )    ROSEANNE (acute kidney injury) ([facility] [address] )    Neutropenia, drug-induced (HCC)    Anemia    Right shoulder pain    Spinal stenosis    Stage 3 chronic kidney disease (HCC)    Tremor       Assessment / Plan:       1  Anemia in chronic kidney disease, unspecified CKD stage   -  Macrocytic; multifactorial, likely drug (tacrolimus) - related , and CKD  repeat work up in 10/2017 showed no blood loss or hemolysis  Iron panel is normal, no bleeding  Suggesting the anemia is likely due to bone marrow suppression by transplant/tacrolimus      -         patient appears responded to last Feraheme and Aranesp  Her hemoglobin is fairly stable, at goal  from 10-11  She is tolerating treatment  No leg swelling, no evidence for thrombosis  -      continue current treatment follow-up in 6 months        Plan  -     Give Feraheme and Aranesp every 4 weeks, follow-up in 6 months          2, neutropenia is stable    3, high risk for iron def  - high risk for iron def due to on Procrit  Need iv iron support  25    minutes were spent on this visit  All questions answered to satisfaction; Advised pt to call if there is any further questions  PHYSICIAL EXAMINATION:       Vital Signs:   [unfilled]  Body mass index is 29 18 kg/m²  Body surface area is 1 83 meters squared  Still appears pale otherwise no major finding on physical examination  No major difference    GEN: Alert, awake oriented x3, in no acute distress;     Wearing mask, appears pale  HEENT- No pallor, icterus, cyanosis, no oral mucosal lesions,   LAD - no palpable cervical, clavicle, axillary, inguinal LAD  Heart- normal S1 S2, regular rate and rhythm, No murmur, rubs     Lungs- clear breathing sound bilateral    Abdomen- soft, Non tender, bowel sounds present  Extremities- No cyanosis, clubbing, edema  Neuro- No focal neurological deficit           PAST MEDICAL HISTORY:   has a past medical history of Anxiety, Arthritis, Blood clot of neck vein, H/O pulmonary fibrosis, High cholesterol, Kidney disease, Lung disease, Pneumonitis, and Spinal stenosis  PAST SURGICAL HISTORY:   has a past surgical history that includes Lung transplant, double; Appendectomy; Tonsillectomy; and Knee arthroscopy  CURRENT MEDICATIONS:     Current Outpatient Medications   Medication Sig Dispense Refill    atovaquone (MEPRON) 750 mg/5 mL suspension Take 750 mg by mouth 2 (two) times a day   brompheniramine-pseudoephedrine-DM 30-2-10 MG/5ML syrup TAKE 10 ML BY MOUTH FOUR TIMES DAILY AS NEEDED FOR CONGESTION OR COUGH 180 mL 0    Calcium Carb-Cholecalciferol (CALCIUM 600+D) 600-800 MG-UNIT TABS Take by mouth 2 (two) times a day      cholestyramine (QUESTRAN) 4 g packet MIX AND DRINK 1 PACKET(4 GRAMS) BY MOUTH DAILY 90 packet 3    citalopram (CeleXA) 10 mg tablet Take 10 mg by mouth daily      Cyanocobalamin (HM SUPER VITAMIN B12 PO) Take 1 tablet by mouth daily   cycloSPORINE (RESTASIS) 0 05 % ophthalmic emulsion 1 drop 2 (two) times a day      docusate sodium (COLACE) 100 mg capsule Take 100 mg by mouth as needed for constipation   gabapentin (NEURONTIN) 600 MG tablet Take 300 mg by mouth 2 (two) times a day        lidocaine (XYLOCAINE) 5 % ointment Apply topically 3 (three) times a day as needed for mild pain 35 44 g 1    Magnesium Cl-Calcium Carbonate (SLOW-MAG PO) Take 138 mg by mouth daily   metoprolol tartrate (LOPRESSOR) 50 mg tablet Take 25 mg by mouth daily        Multiple Vitamins-Minerals (MULTIPLE VITAMINS/WOMENS PO) Take by mouth daily   mycophenolate (CELLCEPT) 250 mg capsule Take 1,000 mg by mouth 2 (two) times a day        pantoprazole (PROTONIX) 40 mg tablet Take 40 mg by mouth daily   pravastatin (PRAVACHOL) 40 mg tablet Take 40 mg by mouth daily   predniSONE 5 mg tablet Take 5 mg by mouth daily        risedronate (ACTONEL) 5 MG tablet Take 5 mg by mouth every morning before breakfast with water on empty stomach, nothing by mouth or lie down for next 30 minutes   Tacrolimus (PROGRAF PO) Take 2 mg by mouth 2 (two) times a day   05 mg twice daily      traMADol (ULTRAM) 50 mg tablet Take 50 mg by mouth daily        Vitamin D, Ergocalciferol, 2000 units CAPS 2 (two) times a day      zolpidem (AMBIEN) 5 mg tablet Take 1 tablet (5 mg total) by mouth daily at bedtime as needed for sleep 30 tablet 3     No current facility-administered medications for this visit  [unfilled]    SOCIAL HISTORY:   reports that she has never smoked  She has never used smokeless tobacco  She reports that she does not drink alcohol or use drugs  FAMILY HISTORY:  family history includes COPD in her father; Hypertension in her father and mother; Lung cancer in her father; Lupus in her other and sister; Skin cancer in her mother  ALLERGIES:  is allergic to zofran [ondansetron] and bactrim [sulfamethoxazole-trimethoprim]  REVIEW OF SYSTEMS:  Please note that a 14-point review of systems was performed to include Constitutional, HEENT, Respiratory, CVS, GI, , Musculoskeletal, Integumentary, Neurologic, Rheumatologic, Endocrinologic, Psychiatric, Lymphatic, and Hematologic/Oncologic systems were reviewed and are negative unless otherwise stated in HPI  Positive and negative findings pertinent to this evaluation are incorporated into the history of present illness              LAB:    Lab Results   Component Value Date    WBC 10 50 (H) 10/03/2017    HGB 8 5 (L) 10/03/2017    HCT 28 4 (L) 10/03/2017     (H) 10/03/2017     10/03/2017       Lab Results   Component Value Date     (L) 06/16/2016    K 6 2 (H) 06/24/2016     (H) 06/24/2016    CO2 18 (L) 06/24/2016    ANIONGAP 5 02/16/2015    BUN 42 (H) 06/24/2016    CREATININE 1 57 (H) 06/24/2016    GLUCOSE 115 02/16/2015    CALCIUM 8 3 06/24/2016    AST 27 06/24/2016 ALT 33 2016    ALKPHOS 93 2016    PROT 5 8 (L) 2016    BILITOT 0 2 2016    EGFR 32 8 2016       IMAGING:    No orders to display     No results found  HEMATOLOGY / ONCOLOGY CLINIC NOTE    Primary Care Provider: Tyra Martino DO  Referring Provider: Jorge Carlson  MRN: [id number]  : 1950    Reason for Encounter:    Chief Complaint   Patient presents with   Louise Singh Follow-up         Hematology / Oncology History:     Noa Marcelo is a 68 y o  female who came in for follow up  Anemia : Normocytic; multifactorial, likely drug (tacrolimus) related , and CKD  repeat work up showed no blood loss or hemolysis  Patient had a bone marrow biopsy in [facility] showed no major abnormalities per patient  Hemoglobin:  6 9  (),  7 2   (),  7 4 (2018),   8 1 ()  ,  7 5 (10/8/2018)    - 2018:  received 4  iv iron Venofer 300 mg iv weekly  ;   Aranesp 100 mcg weekly x 4   - :  Received 1 unit of PRBC transfusion  - 10/10 : plan to give feraheme 510 mg iv weekly x 2 , Aranesp 100 mcg weekly x 4      - 2019:  Plan to start Feraheme 500 mg IV every 4 weeks, Aranesp 100 mics subcu injection every 4 weeks  - current treatment:  As above      Interval History:     3/20 :  Patient came in for follow-up  Reported no bleeding  Strength stable  Jose dyspnea while walking a long distance  Has been reported patient still appears pale  Patient stopped following hematology in [facility]  :  Came in for follow-up  Reported doing well, no bleeding  Strengths is weak however stable  10/10 : Came in for follow-up  Reported feeling fatigued  No bleeding  No other complaints  4/10/2019:  Patient reported overall has been doing well  She feels the energy has been increasing  No bleeding    Reported her tremor has been getting worse, she scheduled to see a neurologist     Problem list:       Patient Active Problem List   Diagnosis    [facility] status[facility] bilateral [facility] [address] )    ROSEANNE (acute kidney injury) ([facility] [address] )    Neutropenia, drug-induced (HCC)    Anemia    Right shoulder pain    Spinal stenosis    Stage 3 chronic kidney disease (HCC)    Tremor       Assessment / Plan:       1  Anemia in chronic kidney disease, unspecified CKD stage   -  Macrocytic; multifactorial, likely drug (tacrolimus) - related , and CKD  repeat work up in 10/2017 showed no blood loss or hemolysis  Iron panel is normal, no bleeding  Suggesting the anemia is likely due to bone marrow suppression by transplant/tacrolimus      -         patient appears responded to last Feraheme and Aranesp  Her hemoglobin has increased to 11 5 in 11/2018, not gradually decreased to 8 5  We decided to start regular monthly based Feraheme and Aranesp  Will follow patient in 3 months with pre visit lab  Go over hemoglobin is 10 to 11  Plan  -     Give Feraheme and Aranesp every 4 weeks, follow-up in 3 months         2, neutropenia is stable    3, high risk for iron def  - high risk for iron def due to on Procrit  Need iv iron support  25    minutes were spent on this visit  All questions answered to satisfaction; Advised pt to call if there is any further questions  PHYSICIAL EXAMINATION:       Vital Signs:   [unfilled]  Body mass index is 29 18 kg/m²  Body surface area is 1 83 meters squared  No change from last visit  GEN: Alert, awake oriented x3, in no acute distress;     Wearing mask, appears pale  HEENT- No pallor, icterus, cyanosis, no oral mucosal lesions,   LAD - no palpable cervical, clavicle, axillary, inguinal LAD  Heart- normal S1 S2, regular rate and rhythm, No murmur, rubs     Lungs- clear breathing sound bilateral    Abdomen- soft, Non tender, bowel sounds present  Extremities- No cyanosis, clubbing, edema  Neuro- No focal neurological deficit           PAST MEDICAL HISTORY:   has a past medical history of Anxiety, Arthritis, Blood clot of neck vein, H/O pulmonary fibrosis, High cholesterol, Kidney disease, Lung disease, Pneumonitis, and Spinal stenosis  PAST SURGICAL HISTORY:   has a past surgical history that includes Lung transplant, double; Appendectomy; Tonsillectomy; and Knee arthroscopy  CURRENT MEDICATIONS:     Current Outpatient Medications   Medication Sig Dispense Refill    atovaquone (MEPRON) 750 mg/5 mL suspension Take 750 mg by mouth 2 (two) times a day   brompheniramine-pseudoephedrine-DM 30-2-10 MG/5ML syrup TAKE 10 ML BY MOUTH FOUR TIMES DAILY AS NEEDED FOR CONGESTION OR COUGH 180 mL 0    Calcium Carb-Cholecalciferol (CALCIUM 600+D) 600-800 MG-UNIT TABS Take by mouth 2 (two) times a day      cholestyramine (QUESTRAN) 4 g packet MIX AND DRINK 1 PACKET(4 GRAMS) BY MOUTH DAILY 90 packet 3    citalopram (CeleXA) 10 mg tablet Take 10 mg by mouth daily      Cyanocobalamin (HM SUPER VITAMIN B12 PO) Take 1 tablet by mouth daily   cycloSPORINE (RESTASIS) 0 05 % ophthalmic emulsion 1 drop 2 (two) times a day      docusate sodium (COLACE) 100 mg capsule Take 100 mg by mouth as needed for constipation   gabapentin (NEURONTIN) 600 MG tablet Take 300 mg by mouth 2 (two) times a day        lidocaine (XYLOCAINE) 5 % ointment Apply topically 3 (three) times a day as needed for mild pain 35 44 g 1    Magnesium Cl-Calcium Carbonate (SLOW-MAG PO) Take 138 mg by mouth daily   metoprolol tartrate (LOPRESSOR) 50 mg tablet Take 25 mg by mouth daily        Multiple Vitamins-Minerals (MULTIPLE VITAMINS/WOMENS PO) Take by mouth daily   mycophenolate (CELLCEPT) 250 mg capsule Take 1,000 mg by mouth 2 (two) times a day        pantoprazole (PROTONIX) 40 mg tablet Take 40 mg by mouth daily   pravastatin (PRAVACHOL) 40 mg tablet Take 40 mg by mouth daily   predniSONE 5 mg tablet Take 5 mg by mouth daily        risedronate (ACTONEL) 5 MG tablet Take 5 mg by mouth every morning before breakfast with water on empty stomach, nothing by mouth or lie down for next 30 minutes   Tacrolimus (PROGRAF PO) Take 2 mg by mouth 2 (two) times a day   05 mg twice daily      traMADol (ULTRAM) 50 mg tablet Take 50 mg by mouth daily        Vitamin D, Ergocalciferol, 2000 units CAPS 2 (two) times a day      zolpidem (AMBIEN) 5 mg tablet Take 1 tablet (5 mg total) by mouth daily at bedtime as needed for sleep 30 tablet 3     No current facility-administered medications for this visit  [unfilled]    SOCIAL HISTORY:   reports that she has never smoked  She has never used smokeless tobacco  She reports that she does not drink alcohol or use drugs  FAMILY HISTORY:  family history includes COPD in her father; Hypertension in her father and mother; Lung cancer in her father; Lupus in her other and sister; Skin cancer in her mother  ALLERGIES:  is allergic to zofran [ondansetron] and bactrim [sulfamethoxazole-trimethoprim]  REVIEW OF SYSTEMS:  Please note that a 14-point review of systems was performed to include Constitutional, HEENT, Respiratory, CVS, GI, , Musculoskeletal, Integumentary, Neurologic, Rheumatologic, Endocrinologic, Psychiatric, Lymphatic, and Hematologic/Oncologic systems were reviewed and are negative unless otherwise stated in HPI  Positive and negative findings pertinent to this evaluation are incorporated into the history of present illness              LAB:    Lab Results   Component Value Date    WBC 10 50 (H) 10/03/2017    HGB 8 5 (L) 10/03/2017    HCT 28 4 (L) 10/03/2017     (H) 10/03/2017     10/03/2017       Lab Results   Component Value Date     (L) 06/16/2016    K 6 2 (H) 06/24/2016     (H) 06/24/2016    CO2 18 (L) 06/24/2016    ANIONGAP 5 02/16/2015    BUN 42 (H) 06/24/2016    CREATININE 1 57 (H) 06/24/2016    GLUCOSE 115 02/16/2015    CALCIUM 8 3 06/24/2016    AST 27 06/24/2016    ALT 33 06/24/2016    ALKPHOS 93 06/24/2016 PROT 5 8 (L) 06/16/2016    BILITOT 0 2 06/16/2016    EGFR 32 8 06/24/2016       IMAGING:    No orders to display     No results found

## 2019-07-31 ENCOUNTER — HOSPITAL ENCOUNTER (OUTPATIENT)
Dept: INFUSION CENTER | Facility: CLINIC | Age: 69
Discharge: HOME | End: 2019-07-31
Payer: MEDICARE

## 2019-07-31 VITALS — DIASTOLIC BLOOD PRESSURE: 60 MMHG | SYSTOLIC BLOOD PRESSURE: 120 MMHG

## 2019-07-31 DIAGNOSIS — N18.9 ANEMIA DUE TO CHRONIC KIDNEY DISEASE, UNSPECIFIED CKD STAGE: Primary | ICD-10-CM

## 2019-07-31 DIAGNOSIS — D63.1 ANEMIA DUE TO CHRONIC KIDNEY DISEASE, UNSPECIFIED CKD STAGE: Primary | ICD-10-CM

## 2019-07-31 PROCEDURE — 96372 THER/PROPH/DIAG INJ SC/IM: CPT

## 2019-07-31 RX ADMIN — DARBEPOETIN ALFA 100 MCG: 100 INJECTION, SOLUTION INTRAVENOUS; SUBCUTANEOUS at 14:26

## 2019-07-31 NOTE — PROGRESS NOTES
Pt labs reviewed and copy placed on chart  Hemoglobin 10  4   avs given to patient and appt checked

## 2019-08-14 ENCOUNTER — HOSPITAL ENCOUNTER (OUTPATIENT)
Dept: INFUSION CENTER | Facility: CLINIC | Age: 69
Discharge: HOME | End: 2019-08-14
Payer: MEDICARE

## 2019-08-14 VITALS
HEART RATE: 88 BPM | SYSTOLIC BLOOD PRESSURE: 118 MMHG | RESPIRATION RATE: 18 BRPM | DIASTOLIC BLOOD PRESSURE: 72 MMHG | TEMPERATURE: 97.8 F

## 2019-08-14 DIAGNOSIS — N18.30 STAGE 3 CHRONIC KIDNEY DISEASE (HCC): Primary | ICD-10-CM

## 2019-08-14 DIAGNOSIS — D63.1 ANEMIA DUE TO CHRONIC KIDNEY DISEASE, UNSPECIFIED CKD STAGE: ICD-10-CM

## 2019-08-14 DIAGNOSIS — N18.9 ANEMIA DUE TO CHRONIC KIDNEY DISEASE, UNSPECIFIED CKD STAGE: ICD-10-CM

## 2019-08-14 PROCEDURE — 96365 THER/PROPH/DIAG IV INF INIT: CPT

## 2019-08-14 RX ORDER — SODIUM CHLORIDE 9 MG/ML
20 INJECTION, SOLUTION INTRAVENOUS ONCE
Status: COMPLETED | OUTPATIENT
Start: 2019-08-14 | End: 2019-08-14

## 2019-08-14 RX ORDER — SODIUM CHLORIDE 9 MG/ML
20 INJECTION, SOLUTION INTRAVENOUS ONCE
Status: CANCELLED | OUTPATIENT
Start: 2019-09-11

## 2019-08-14 RX ADMIN — FERUMOXYTOL 510 MG: 510 INJECTION INTRAVENOUS at 15:25

## 2019-08-14 RX ADMIN — SODIUM CHLORIDE 20 ML/HR: 0.9 INJECTION, SOLUTION INTRAVENOUS at 15:03

## 2019-08-14 NOTE — PATIENT INSTRUCTIONS
August 2019 Sunday Monday Tuesday Wednesday Thursday Friday Saturday                       1     2     3                4     5     6     7     8     9     10                11     12     13     14    INF THERAPY PLAN   2:30 PM   (60 min )   AN INF CHAIR [address] 15     16     17          Cycle 1, Treatment 6      18     19     20     21     22     23     24                25     26     27     28    INF THERAPY PLAN   2:30 PM   (30 min )   AN INF QUICK CHAIR [address] 41     16     53                     Treatment Details       8/14/2019 - Cycle 1, Treatment 6      Supportive Care: [facility], ferumoxytol Milind Buff)        September 2019 Sunday Monday Tuesday Wednesday Thursday Friday Saturday   1     2     3     4     5     6     7                8     9     10     11    INF IV MED IRON OTHER   2:30 PM   (60 min )   AN INF CHAIR [facility] 12     13     14                15     16     17     18     19     20     21                22     23     24     25    INF THERAPY PLAN   2:00 PM   (30 min )   AN INF QUICK CHAIR [address] 26     27     28                29     30

## 2019-08-14 NOTE — PROGRESS NOTES
Patient was here for feraheme and is doing well, she offers no c/o today  She tolerated treatment without incident and was discharged post   Next appointment here 8/28/19 for aranesp as previously scheduled

## 2019-08-27 ENCOUNTER — TELEPHONE (OUTPATIENT)
Dept: HEMATOLOGY ONCOLOGY | Facility: CLINIC | Age: 69
End: 2019-08-27

## 2019-08-27 ENCOUNTER — OFFICE VISIT (OUTPATIENT)
Dept: FAMILY MEDICINE CLINIC | Facility: CLINIC | Age: 69
End: 2019-08-27
Payer: MEDICARE

## 2019-08-27 VITALS
BODY MASS INDEX: 28.85 KG/M2 | SYSTOLIC BLOOD PRESSURE: 110 MMHG | DIASTOLIC BLOOD PRESSURE: 72 MMHG | HEIGHT: 64 IN | TEMPERATURE: 98.1 F | HEART RATE: 80 BPM | WEIGHT: 169 LBS

## 2019-08-27 DIAGNOSIS — M79.642 PAIN IN BOTH HANDS: ICD-10-CM

## 2019-08-27 DIAGNOSIS — R19.7 DIARRHEA, UNSPECIFIED TYPE: ICD-10-CM

## 2019-08-27 DIAGNOSIS — R31.9 URINARY TRACT INFECTION WITH HEMATURIA, SITE UNSPECIFIED: Primary | ICD-10-CM

## 2019-08-27 DIAGNOSIS — M79.641 PAIN IN BOTH HANDS: ICD-10-CM

## 2019-08-27 DIAGNOSIS — N39.0 URINARY TRACT INFECTION WITH HEMATURIA, SITE UNSPECIFIED: Primary | ICD-10-CM

## 2019-08-27 PROBLEM — N18.4 CHRONIC KIDNEY DISEASE, STAGE IV (SEVERE) (HCC): Status: ACTIVE | Noted: 2019-08-27

## 2019-08-27 PROBLEM — I82.4Z3 ACUTE EMBOLISM AND THROMBOSIS OF DEEP VEIN OF BOTH DISTAL LOWER EXTREMITIES (HCC): Status: ACTIVE | Noted: 2019-08-27

## 2019-08-27 LAB
SL AMB  POCT GLUCOSE, UA: NEGATIVE
SL AMB LEUKOCYTE ESTERASE,UA: ABNORMAL
SL AMB POCT BILIRUBIN,UA: NEGATIVE
SL AMB POCT BLOOD,UA: ABNORMAL
SL AMB POCT CLARITY,UA: CLEAR
SL AMB POCT COLOR,UA: YELLOW
SL AMB POCT KETONES,UA: NEGATIVE
SL AMB POCT NITRITE,UA: NEGATIVE
SL AMB POCT PH,UA: 5
SL AMB POCT SPECIFIC GRAVITY,UA: 1.02
SL AMB POCT URINE PROTEIN: ABNORMAL
SL AMB POCT UROBILINOGEN: 0.2

## 2019-08-27 PROCEDURE — 99214 OFFICE O/P EST MOD 30 MIN: CPT | Performed by: FAMILY MEDICINE

## 2019-08-27 PROCEDURE — 81002 URINALYSIS NONAUTO W/O SCOPE: CPT | Performed by: FAMILY MEDICINE

## 2019-08-27 RX ORDER — CEPHALEXIN 500 MG/1
500 CAPSULE ORAL EVERY 8 HOURS SCHEDULED
Qty: 30 CAPSULE | Refills: 0 | Status: SHIPPED | OUTPATIENT
Start: 2019-08-27 | End: 2019-09-06

## 2019-08-27 RX ORDER — DIPHENOXYLATE HYDROCHLORIDE AND ATROPINE SULFATE 2.5; .025 MG/1; MG/1
1 TABLET ORAL 4 TIMES DAILY PRN
Qty: 30 TABLET | Refills: 0 | Status: SHIPPED | OUTPATIENT
Start: 2019-08-27 | End: 2020-06-19 | Stop reason: ALTCHOICE

## 2019-08-27 NOTE — TELEPHONE ENCOUNTER
Called and left message again for patient to advise that next months treatment will be based off of the patients next monthly lab results  I encouraged a return call if there were any additional questions

## 2019-08-27 NOTE — TELEPHONE ENCOUNTER
Cassidy Taylor called and just was answering Palak Axon  call, I read the prior note and Cassidy Taylor wanted to know if he will need the iron infusion and the shot next month   Please reach out to him at [phone]

## 2019-08-27 NOTE — PROGRESS NOTES
BMI Counseling: Body mass index is 29 01 kg/m²  Discussed the patient's BMI with her  The BMI is above average  BMI counseling and education was provided to the patient  Nutrition recommendations include reducing portion sizes  Patient ID: Niranjan Ang is a 68 y o  female  HPI: 68 y  o female presenting with urinary frequency and urgency for the past 72 hrs;  Pt denies any dysuria, fever or chills  She is still experiencing chronic diarrhea as a side effect of the many meds she is on and Pauline Huston is not helping  In addition, pt has osteoarthritis of the hands and because of stage 3 renal failure and chronic prednisone therapy as part of her antirejection regimen, she cannot take an NSAID  She complains of chronic pain and stiffness of her hands        SUBJECTIVE    Family History   Problem Relation Age of Onset    Hypertension Mother     Skin cancer Mother     COPD Father     Hypertension Father         ESSENTIAL    Lung cancer Father     Lupus Sister     Lupus Other      Social History     Socioeconomic History    Marital status: /Civil Union     Spouse name: Not on file    Number of children: Not on file    Years of education: Not on file    Highest education level: Not on file   Occupational History    Not on file   Social Needs    Financial resource strain: Not on file    Food insecurity:     Worry: Not on file     Inability: Not on file    Transportation needs:     Medical: Not on file     Non-medical: Not on file   Tobacco Use    Smoking status: Never Smoker    Smokeless tobacco: Never Used   Substance and Sexual Activity    Alcohol use: No    Drug use: No    Sexual activity: Not Currently   Lifestyle    Physical activity:     Days per week: Not on file     Minutes per session: Not on file    Stress: Not on file   Relationships    Social connections:     Talks on phone: Not on file     Gets together: Not on file     Attends [employer] service: Not on file     Active member of club or organization: Not on file     Attends meetings of clubs or organizations: Not on file     Relationship status: Not on file    Intimate partner violence:     Fear of current or ex partner: Not on file     Emotionally abused: Not on file     Physically abused: Not on file     Forced sexual activity: Not on file   Other Topics Concern    Not on file   Social History Narrative    DAILY COFFEE CONSUMPTION    DAILY COLA CONSUMPTION    DAILY TEA CONSUMPTION     Past Medical History:   Diagnosis Date    Anxiety     Arthritis     Blood clot of neck vein     R side    H/O pulmonary fibrosis     High cholesterol     Kidney disease     Lung disease     Pneumonitis     Spinal stenosis      Past Surgical History:   Procedure Laterality Date    APPENDECTOMY      KNEE ARTHROSCOPY      LUNG TRANSPLANT, DOUBLE      Pt had fibrosis and pneumonitis    TONSILLECTOMY       Allergies   Allergen Reactions    Zofran [Ondansetron] Hallucinations    Bactrim [Sulfamethoxazole-Trimethoprim] Rash       Current Outpatient Medications:     atovaquone (MEPRON) 750 mg/5 mL suspension, Take 750 mg by mouth 2 (two) times a day , Disp: , Rfl:     Calcium Carb-Cholecalciferol (CALCIUM 600+D) 600-800 MG-UNIT TABS, Take by mouth 2 (two) times a day, Disp: , Rfl:     cholestyramine (QUESTRAN) 4 g packet, MIX AND DRINK 1 PACKET(4 GRAMS) BY MOUTH DAILY, Disp: 90 packet, Rfl: 3    citalopram (CeleXA) 10 mg tablet, Take 10 mg by mouth daily, Disp: , Rfl:     Cyanocobalamin (HM SUPER VITAMIN B12 PO), Take 1 tablet by mouth daily  , Disp: , Rfl:     cycloSPORINE (RESTASIS) 0 05 % ophthalmic emulsion, 1 drop 2 (two) times a day, Disp: , Rfl:     docusate sodium (COLACE) 100 mg capsule, Take 100 mg by mouth as needed for constipation    , Disp: , Rfl:     gabapentin (NEURONTIN) 600 MG tablet, Take 300 mg by mouth 2 (two) times a day  , Disp: , Rfl:     lidocaine (XYLOCAINE) 5 % ointment, Apply topically 3 (three) times a day as needed for mild pain, Disp: 35 44 g, Rfl: 1    Magnesium Cl-Calcium Carbonate (SLOW-MAG PO), Take 138 mg by mouth daily  , Disp: , Rfl:     metoprolol tartrate (LOPRESSOR) 50 mg tablet, Take 25 mg by mouth daily  , Disp: , Rfl:     Multiple Vitamins-Minerals (MULTIPLE VITAMINS/WOMENS PO), Take by mouth daily  , Disp: , Rfl:     mycophenolate (CELLCEPT) 250 mg capsule, Take 1,000 mg by mouth 2 (two) times a day  , Disp: , Rfl:     pantoprazole (PROTONIX) 40 mg tablet, Take 40 mg by mouth daily  , Disp: , Rfl:     pravastatin (PRAVACHOL) 40 mg tablet, Take 40 mg by mouth daily  , Disp: , Rfl:     predniSONE 5 mg tablet, Take 5 mg by mouth daily  , Disp: , Rfl:     risedronate (ACTONEL) 5 MG tablet, Take 5 mg by mouth every morning before breakfast with water on empty stomach, nothing by mouth or lie down for next 30 minutes  , Disp: , Rfl:     traMADol (ULTRAM) 50 mg tablet, Take 50 mg by mouth daily  , Disp: , Rfl:     Vitamin D, Ergocalciferol, 2000 units CAPS, 2 (two) times a day, Disp: , Rfl:     zolpidem (AMBIEN) 5 mg tablet, Take 1 tablet (5 mg total) by mouth daily at bedtime as needed for sleep, Disp: 30 tablet, Rfl: 3    cephalexin (KEFLEX) 500 mg capsule, Take 1 capsule (500 mg total) by mouth every 8 (eight) hours for 10 days, Disp: 30 capsule, Rfl: 0    diclofenac sodium (VOLTAREN) 1 %, Apply 2 g topically 4 (four) times a day, Disp: 100 g, Rfl: 3    diphenoxylate-atropine (LOMOTIL) 2 5-0 025 mg per tablet, Take 1 tablet by mouth 4 (four) times a day as needed for diarrhea, Disp: 30 tablet, Rfl: 0    Tacrolimus (PROGRAF PO), Take 3 mg by mouth 2 (two) times a day   05 mg twice daily, Disp: , Rfl:     Review of Systems  Constitutional:     Denies fever, chills ,fatigue ,weakness ,weight loss, weight gain     ENT: Denies earache ,loss of hearing ,nosebleed, nasal discharge,nasal congestion ,sore throat ,hoarseness  Pulmonary: Denies shortness of breath ,cough  ,dyspnea on exertion, orthopnea  ,PND   Cardiovascular:  Denies bradycardia , tachycardia  ,palpations, lower extremity edema leg, claudication  Breast:  Denies new or changing breast lumps ,nipple discharge ,nipple changes  Abdomen:  Denies abdominal pain , anorexia , indigestion, nausea, vomiting, constipation, + chronic diarrhea  Musculoskeletal: Denies myalgias, , joint swelling, , limb pain, limb swelling+ chronic pain and stiffness of both hands  Gu: denies dysuria,+ polyuria and urgency  Skin: Denies skin rash, skin lesion, skin wound, itching, dry skin  Neuro: Denies headache, numbness, tingling, confusion, loss of consciousness, dizziness, vertigo  Psychiatric: Denies feelings of depression, suicidal ideation, anxiety, sleep disturbances    OBJECTIVE  /72   Pulse 80   Temp 98 1 °F (36 7 °C)   Ht 5' 4" (1 626 m)   Wt 76 7 kg (169 lb)   LMP  (LMP Unknown)   BMI 29 01 kg/m²   Constitutional:   NAD, well appearing and well nourished      ENT:   Conjunctiva and lids: no injection, edema, or discharge     Pupils and iris: DIMA bilaterally    External inspection of ears and nose: normal without deformities or discharge  Otoscopic exam: Canals patent without erythema  Nasal mucosa, septum and turbinates: Normal or edema or discharge         Oropharynx:  Moist mucosa, normal tongue and tonsils without lesions  No erythema        Pulmonary:Respiratory effort normal rate and rhythm, no increased work of breathing   Auscultation of lungs:  Clear bilaterally with no adventitious breath sounds       Cardiovascular: regular rate and rhythm, S1 and S2, no murmur, no edema and/or varicosities of LE      Abdomen: Soft and non-distended     Positive bowel sounds      No heptomegaly or splenomegaly      Gu: no suprapubic tenderness or CVA tenderness, no urethral discharge  Lymphatic:  No anterior or posterior cervical lymphadenopathy         Musculoskeletal:  Gait and station: Normal gait      Digits and nails normal without clubbing or cyanosis       Inspection/palpation of joints, bones, and muscles:  No joint tenderness, swelling, full active and passive range of motion + distal nodules on fingers bilaterally with difficulty flexing and fully extending fingers      Skin: Normal skin turgor and no rashes      Neuro:      Normal reflexes      Psych:   alert and oriented to person, place and time     normal mood and affect       Assessment/Plan:Diagnoses and all orders for this visit:    Urinary tract infection with hematuria, site unspecified  -     cephalexin (KEFLEX) 500 mg capsule; Take 1 capsule (500 mg total) by mouth every 8 (eight) hours for 10 days  -     POCT urine dip    Diarrhea, unspecified type  -     diphenoxylate-atropine (LOMOTIL) 2 5-0 025 mg per tablet; Take 1 tablet by mouth 4 (four) times a day as needed for diarrhea    Pain in both hands  -     diclofenac sodium (VOLTAREN) 1 %; Apply 2 g topically 4 (four) times a day        Reviewed with patient plan to treat with plan as above      Patient instructed to call in 72 hours if not feeling better or if symptoms worsen

## 2019-08-27 NOTE — TELEPHONE ENCOUNTER
Called and left message on voicemail advising patient infusion treatment for monthly jeremy was being cancelled for tomorrow (8/28/19) per Dr Karis Omalley as her Hgb=11 3 on 8/12 - which is above parameter for monthly aranesp treatment  I explained we would continue with next months treatment as scheduled  I encouraged a call back if there were any questions or concerns

## 2019-08-28 ENCOUNTER — HOSPITAL ENCOUNTER (OUTPATIENT)
Dept: INFUSION CENTER | Facility: CLINIC | Age: 69
End: 2019-08-28

## 2019-09-03 ENCOUNTER — CLINICAL SUPPORT (OUTPATIENT)
Dept: FAMILY MEDICINE CLINIC | Facility: CLINIC | Age: 69
End: 2019-09-03
Payer: MEDICARE

## 2019-09-03 DIAGNOSIS — N39.0 URINARY TRACT INFECTION WITHOUT HEMATURIA, SITE UNSPECIFIED: Primary | ICD-10-CM

## 2019-09-03 LAB
SL AMB  POCT GLUCOSE, UA: ABNORMAL
SL AMB LEUKOCYTE ESTERASE,UA: ABNORMAL
SL AMB POCT BILIRUBIN,UA: ABNORMAL
SL AMB POCT BLOOD,UA: ABNORMAL
SL AMB POCT CLARITY,UA: CLEAR
SL AMB POCT COLOR,UA: YELLOW
SL AMB POCT KETONES,UA: ABNORMAL
SL AMB POCT NITRITE,UA: ABNORMAL
SL AMB POCT PH,UA: 5
SL AMB POCT SPECIFIC GRAVITY,UA: 1.01
SL AMB POCT URINE PROTEIN: ABNORMAL
SL AMB POCT UROBILINOGEN: 0.2

## 2019-09-03 PROCEDURE — 81002 URINALYSIS NONAUTO W/O SCOPE: CPT | Performed by: FAMILY MEDICINE

## 2019-09-03 NOTE — PROGRESS NOTES
Pt was here with Dr Brandy Barroso for appoint 8-27-19 and had a UTI and was put on antibiotic ,she was unable to come in ,but Dr Brandy Barroso told Pt to bring in urine for check after completing antibiotic ,so someone dropped it off for her ,and she is still Positive for UTI

## 2019-09-11 ENCOUNTER — HOSPITAL ENCOUNTER (OUTPATIENT)
Dept: INFUSION CENTER | Facility: CLINIC | Age: 69
Discharge: HOME | End: 2019-09-11
Payer: MEDICARE

## 2019-09-11 VITALS
HEART RATE: 86 BPM | TEMPERATURE: 98.3 F | RESPIRATION RATE: 18 BRPM | DIASTOLIC BLOOD PRESSURE: 57 MMHG | SYSTOLIC BLOOD PRESSURE: 120 MMHG

## 2019-09-11 DIAGNOSIS — D63.1 ANEMIA DUE TO CHRONIC KIDNEY DISEASE, UNSPECIFIED CKD STAGE: ICD-10-CM

## 2019-09-11 DIAGNOSIS — N18.9 ANEMIA DUE TO CHRONIC KIDNEY DISEASE, UNSPECIFIED CKD STAGE: ICD-10-CM

## 2019-09-11 DIAGNOSIS — N18.30 STAGE 3 CHRONIC KIDNEY DISEASE (HCC): Primary | ICD-10-CM

## 2019-09-11 PROCEDURE — 96365 THER/PROPH/DIAG IV INF INIT: CPT

## 2019-09-11 RX ORDER — SODIUM CHLORIDE 9 MG/ML
20 INJECTION, SOLUTION INTRAVENOUS ONCE
Status: CANCELLED | OUTPATIENT
Start: 2019-10-09

## 2019-09-11 RX ORDER — SODIUM CHLORIDE 9 MG/ML
20 INJECTION, SOLUTION INTRAVENOUS ONCE
Status: COMPLETED | OUTPATIENT
Start: 2019-09-11 | End: 2019-09-11

## 2019-09-11 RX ADMIN — SODIUM CHLORIDE 20 ML/HR: 0.9 INJECTION, SOLUTION INTRAVENOUS at 15:33

## 2019-09-11 RX ADMIN — FERUMOXYTOL 510 MG: 510 INJECTION INTRAVENOUS at 15:33

## 2019-09-11 NOTE — PLAN OF CARE
Problem: Potential for Falls  Goal: Patient will remain free of falls  Description  INTERVENTIONS:  - Assess patient frequently for physical needs  -  Identify cognitive and physical deficits and behaviors that affect risk of falls  -  [place] fall precautions as indicated by assessment   - Educate patient/family on patient safety including physical limitations  - Instruct patient to call for assistance with activity based on assessment  - Modify environment to reduce risk of injury  - Consider OT/PT consult to assist with strengthening/mobility  Outcome: Progressing     Problem: Knowledge Deficit  Goal: Patient/family/caregiver demonstrates understanding of disease process, treatment plan, medications, and discharge instructions  Description  Complete learning assessment and assess knowledge base    Interventions:  - Provide teaching at level of understanding  - Provide teaching via preferred learning methods  Outcome: Progressing

## 2019-09-11 NOTE — PROGRESS NOTES
Patient tolerated infusion well and without incident, patient observed for 30 mins post infusion, patient offers no complaints, AVS given, patient is aware of future apt, patient D/C in stable condition

## 2019-09-11 NOTE — PROGRESS NOTES
Patient here today for farheme infusion, patient offers no complaints, vitals are stable   PIV placed without incident

## 2019-09-13 ENCOUNTER — CLINICAL SUPPORT (OUTPATIENT)
Dept: FAMILY MEDICINE CLINIC | Facility: CLINIC | Age: 69
End: 2019-09-13
Payer: MEDICARE

## 2019-09-13 VITALS
DIASTOLIC BLOOD PRESSURE: 60 MMHG | SYSTOLIC BLOOD PRESSURE: 118 MMHG | BODY MASS INDEX: 28.85 KG/M2 | HEIGHT: 64 IN | TEMPERATURE: 96.8 F | WEIGHT: 169 LBS | HEART RATE: 82 BPM

## 2019-09-13 DIAGNOSIS — N39.0 URINARY TRACT INFECTION WITHOUT HEMATURIA, SITE UNSPECIFIED: Primary | ICD-10-CM

## 2019-09-13 LAB
SL AMB  POCT GLUCOSE, UA: ABNORMAL
SL AMB LEUKOCYTE ESTERASE,UA: ABNORMAL
SL AMB POCT BILIRUBIN,UA: ABNORMAL
SL AMB POCT BLOOD,UA: ABNORMAL
SL AMB POCT CLARITY,UA: CLEAR
SL AMB POCT COLOR,UA: YELLOW
SL AMB POCT KETONES,UA: ABNORMAL
SL AMB POCT NITRITE,UA: ABNORMAL
SL AMB POCT PH,UA: 5
SL AMB POCT SPECIFIC GRAVITY,UA: 1.02
SL AMB POCT URINE PROTEIN: ABNORMAL
SL AMB POCT UROBILINOGEN: 0.2

## 2019-09-13 PROCEDURE — 81002 URINALYSIS NONAUTO W/O SCOPE: CPT | Performed by: FAMILY MEDICINE

## 2019-09-13 PROCEDURE — 99211 OFF/OP EST MAY X REQ PHY/QHP: CPT | Performed by: FAMILY MEDICINE

## 2019-09-13 PROCEDURE — 1124F ACP DISCUSS-NO DSCNMKR DOCD: CPT | Performed by: FAMILY MEDICINE

## 2019-09-13 RX ORDER — NITROFURANTOIN 25; 75 MG/1; MG/1
100 CAPSULE ORAL 2 TIMES DAILY
Qty: 10 CAPSULE | Refills: 0 | Status: SHIPPED | OUTPATIENT
Start: 2019-09-13 | End: 2019-09-18

## 2019-09-13 NOTE — PROGRESS NOTES
Pt here for a recheck after completing antibiotic for UTI ,a urine dip was preformed and Pt is still Positive for a UTI, as per Natalia Crowell Pt will be placed on another antibiotic and she is to go for a formal UA C&S about 3 days after completing antibiotic to see if she is clear

## 2019-09-20 RX ORDER — SODIUM CHLORIDE 9 MG/ML
20 INJECTION, SOLUTION INTRAVENOUS ONCE
Status: CANCELLED | OUTPATIENT
Start: 2019-09-25

## 2019-09-24 RX ORDER — SODIUM CHLORIDE 9 MG/ML
20 INJECTION, SOLUTION INTRAVENOUS ONCE
Status: CANCELLED | OUTPATIENT
Start: 2019-10-09

## 2019-09-25 ENCOUNTER — HOSPITAL ENCOUNTER (OUTPATIENT)
Dept: INFUSION CENTER | Facility: CLINIC | Age: 69
Discharge: HOME | End: 2019-09-25
Payer: MEDICARE

## 2019-09-25 VITALS
OXYGEN SATURATION: 98 % | TEMPERATURE: 98.3 F | SYSTOLIC BLOOD PRESSURE: 108 MMHG | HEART RATE: 76 BPM | RESPIRATION RATE: 18 BRPM | DIASTOLIC BLOOD PRESSURE: 64 MMHG

## 2019-09-25 DIAGNOSIS — D63.1 ANEMIA DUE TO CHRONIC KIDNEY DISEASE, UNSPECIFIED CKD STAGE: Primary | ICD-10-CM

## 2019-09-25 DIAGNOSIS — N18.9 ANEMIA DUE TO CHRONIC KIDNEY DISEASE, UNSPECIFIED CKD STAGE: Primary | ICD-10-CM

## 2019-09-25 LAB
APPEARANCE UR: ABNORMAL
BACTERIA UR QL AUTO: ABNORMAL /HPF
BASOPHILS # BLD AUTO: 0.08 THOUSANDS/ΜL (ref 0–0.1)
BASOPHILS NFR BLD AUTO: 1 % (ref 0–1)
BILIRUB UR QL STRIP: NEGATIVE
COLOR UR: YELLOW
EOSINOPHIL # BLD AUTO: 0.21 THOUSAND/ΜL (ref 0–0.61)
EOSINOPHIL NFR BLD AUTO: 2 % (ref 0–6)
ERYTHROCYTE [DISTWIDTH] IN BLOOD BY AUTOMATED COUNT: 13.7 % (ref 11.6–15.1)
GLUCOSE UR QL STRIP: NEGATIVE
HCT VFR BLD AUTO: 33.2 % (ref 34.8–46.1)
HGB BLD-MCNC: 10.1 G/DL (ref 11.5–15.4)
HGB UR QL STRIP: NEGATIVE
HYALINE CASTS #/AREA URNS LPF: ABNORMAL /LPF
IMM GRANULOCYTES # BLD AUTO: 0.17 THOUSAND/UL (ref 0–0.2)
IMM GRANULOCYTES NFR BLD AUTO: 2 % (ref 0–2)
KETONES UR QL STRIP: NEGATIVE
LEUKOCYTE ESTERASE UR QL STRIP: ABNORMAL
LYMPHOCYTES # BLD AUTO: 0.75 THOUSANDS/ΜL (ref 0.6–4.47)
LYMPHOCYTES NFR BLD AUTO: 8 % (ref 14–44)
MCH RBC QN AUTO: 32.1 PG (ref 26.8–34.3)
MCHC RBC AUTO-ENTMCNC: 30.4 G/DL (ref 31.4–37.4)
MCV RBC AUTO: 105 FL (ref 82–98)
MONOCYTES # BLD AUTO: 0.96 THOUSAND/ΜL (ref 0.17–1.22)
MONOCYTES NFR BLD AUTO: 10 % (ref 4–12)
NEUTROPHILS # BLD AUTO: 7.52 THOUSANDS/ΜL (ref 1.85–7.62)
NEUTS SEG NFR BLD AUTO: 77 % (ref 43–75)
NITRITE UR QL STRIP: NEGATIVE
NRBC BLD AUTO-RTO: 0 /100 WBCS
PH UR STRIP: ABNORMAL [PH] (ref 5–8)
PLATELET # BLD AUTO: 251 THOUSANDS/UL (ref 149–390)
PMV BLD AUTO: 9.6 FL (ref 8.9–12.7)
PROT UR QL STRIP: NEGATIVE
RBC # BLD AUTO: 3.15 MILLION/UL (ref 3.81–5.12)
RBC #/AREA URNS HPF: ABNORMAL /HPF
SP GR UR STRIP: 1.01 (ref 1–1.03)
SQUAMOUS #/AREA URNS HPF: ABNORMAL /HPF
WBC # BLD AUTO: 9.69 THOUSAND/UL (ref 4.31–10.16)
WBC #/AREA URNS HPF: ABNORMAL /HPF

## 2019-09-25 PROCEDURE — 96372 THER/PROPH/DIAG INJ SC/IM: CPT

## 2019-09-25 PROCEDURE — 85025 COMPLETE CBC W/AUTO DIFF WBC: CPT | Performed by: INTERNAL MEDICINE

## 2019-09-25 RX ADMIN — DARBEPOETIN ALFA 100 MCG: 100 INJECTION, SOLUTION INTRAVENOUS; SUBCUTANEOUS at 14:54

## 2019-09-25 NOTE — PROGRESS NOTES
Patient tolerated injection well in the right arm, patient offers no complaints, patietn given a copy of todays labs, patient refused avs and is aware of future apt   Pt d/c in stable condition

## 2019-09-25 NOTE — PROGRESS NOTES
Hemoglobin back and is confirmed at 10 1 with Deniz Navarro RN    Ok to treat as per defined parameters

## 2019-09-25 NOTE — PROGRESS NOTES
Patient here today for aranesp and peripheral lab, labs sent to lab and will waiting for hemoglobin prior to injection

## 2019-10-09 ENCOUNTER — HOSPITAL ENCOUNTER (OUTPATIENT)
Dept: INFUSION CENTER | Facility: CLINIC | Age: 69
Discharge: HOME | End: 2019-10-09
Payer: MEDICARE

## 2019-10-09 VITALS
SYSTOLIC BLOOD PRESSURE: 100 MMHG | DIASTOLIC BLOOD PRESSURE: 60 MMHG | RESPIRATION RATE: 18 BRPM | TEMPERATURE: 98.3 F | HEART RATE: 78 BPM

## 2019-10-09 DIAGNOSIS — N18.30 STAGE 3 CHRONIC KIDNEY DISEASE (HCC): Primary | ICD-10-CM

## 2019-10-09 DIAGNOSIS — D63.1 ANEMIA DUE TO CHRONIC KIDNEY DISEASE, UNSPECIFIED CKD STAGE: ICD-10-CM

## 2019-10-09 DIAGNOSIS — N18.9 ANEMIA DUE TO CHRONIC KIDNEY DISEASE, UNSPECIFIED CKD STAGE: ICD-10-CM

## 2019-10-09 PROCEDURE — 96365 THER/PROPH/DIAG IV INF INIT: CPT

## 2019-10-09 RX ORDER — SODIUM CHLORIDE 9 MG/ML
20 INJECTION, SOLUTION INTRAVENOUS ONCE
Status: CANCELLED | OUTPATIENT
Start: 2019-11-06

## 2019-10-09 RX ORDER — SODIUM CHLORIDE 9 MG/ML
20 INJECTION, SOLUTION INTRAVENOUS ONCE
Status: COMPLETED | OUTPATIENT
Start: 2019-10-09 | End: 2019-10-09

## 2019-10-09 RX ADMIN — SODIUM CHLORIDE 20 ML/HR: 0.9 INJECTION, SOLUTION INTRAVENOUS at 14:53

## 2019-10-09 RX ADMIN — FERUMOXYTOL 510 MG: 510 INJECTION INTRAVENOUS at 14:53

## 2019-10-09 NOTE — PROGRESS NOTES
Pt presents for Feraheme infusion  Pt offers no complaints  Call bell within reach, will continue to monitor

## 2019-10-21 ENCOUNTER — OFFICE VISIT (OUTPATIENT)
Dept: FAMILY MEDICINE CLINIC | Facility: CLINIC | Age: 69
End: 2019-10-21
Payer: MEDICARE

## 2019-10-21 VITALS
SYSTOLIC BLOOD PRESSURE: 118 MMHG | WEIGHT: 168 LBS | BODY MASS INDEX: 28.68 KG/M2 | HEIGHT: 64 IN | HEART RATE: 70 BPM | DIASTOLIC BLOOD PRESSURE: 74 MMHG | TEMPERATURE: 99.1 F

## 2019-10-21 DIAGNOSIS — Z00.00 MEDICARE ANNUAL WELLNESS VISIT, SUBSEQUENT: Primary | ICD-10-CM

## 2019-10-21 DIAGNOSIS — I82.4Z3 ACUTE EMBOLISM AND THROMBOSIS OF DEEP VEIN OF BOTH DISTAL LOWER EXTREMITIES (HCC): ICD-10-CM

## 2019-10-21 DIAGNOSIS — N18.4 CHRONIC KIDNEY DISEASE, STAGE IV (SEVERE) (HCC): ICD-10-CM

## 2019-10-21 DIAGNOSIS — Z23 NEED FOR VACCINATION: ICD-10-CM

## 2019-10-21 PROCEDURE — 90732 PPSV23 VACC 2 YRS+ SUBQ/IM: CPT | Performed by: FAMILY MEDICINE

## 2019-10-21 PROCEDURE — G0009 ADMIN PNEUMOCOCCAL VACCINE: HCPCS | Performed by: FAMILY MEDICINE

## 2019-10-21 PROCEDURE — G0439 PPPS, SUBSEQ VISIT: HCPCS | Performed by: FAMILY MEDICINE

## 2019-10-21 NOTE — PATIENT INSTRUCTIONS
Medicare Preventive Visit Patient Instructions  Thank you for completing your Welcome to Medicare Visit or Medicare Annual Wellness Visit today  Your next wellness visit will be due in one year (10/21/2020)  The screening/preventive services that you may require over the next 5-10 years are detailed below  Some tests may not apply to you based off risk factors and/or age  Screening tests ordered at today's visit but not completed yet may show as past due  Also, please note that scanned in results may not display below  Preventive Screenings:  Service Recommendations Previous Testing/Comments   Colorectal Cancer Screening  * Colonoscopy    * Fecal Occult Blood Test (FOBT)/Fecal Immunochemical Test (FIT)  * Fecal DNA/Cologuard Test  * Flexible Sigmoidoscopy Age: 54-65 years old   Colonoscopy: every 10 years (may be performed more frequently if at higher risk)  OR  FOBT/FIT: every 1 year  OR  Cologuard: every 3 years  OR  Sigmoidoscopy: every 5 years  Screening may be recommended earlier than age 48 if at higher risk for colorectal cancer  Also, an individualized decision between you and your healthcare provider will decide whether screening between the ages of 74-80 would be appropriate  Colonoscopy: 07/15/2015  FOBT/FIT: Not on file  Cologuard: Not on file  Sigmoidoscopy: Not on file    Screening Current     Breast Cancer Screening Age: 36 years old  Frequency: every 1-2 years  Not required if history of left and right mastectomy Mammogram: 06/09/2015       Cervical Cancer Screening Between the ages of 21-29, pap smear recommended once every 3 years  Between the ages of 33-67, can perform pap smear with HPV co-testing every 5 years     Recommendations may differ for women with a history of total hysterectomy, cervical cancer, or abnormal pap smears in past  Pap Smear: Not on file    Screening Not Indicated   Hepatitis C Screening Once for adults born between 1945 and 1965  More frequently in patients at high risk for Hepatitis C Hep C Antibody: Not on file       Diabetes Screening 1-2 times per year if you're at risk for diabetes or have pre-diabetes Fasting glucose: No results in last 5 years   A1C: 6 3 %       Cholesterol Screening Once every 5 years if you don't have a lipid disorder  May order more often based on risk factors  Lipid panel: 02/08/2015    Screening Current     Other Preventive Screenings Covered by Medicare:  1  Abdominal Aortic Aneurysm (AAA) Screening: covered once if your at risk  You're considered to be at risk if you have a family history of AAA  2  Lung Cancer Screening: covers low dose CT scan once per year if you meet all of the following conditions: (1) Age 50-69; (2) No signs or symptoms of lung cancer; (3) Current smoker or have quit smoking within the last 15 years; (4) You have a tobacco smoking history of at least 30 pack years (packs per day multiplied by number of years you smoked); (5) You get a written order from a healthcare provider  3  Glaucoma Screening: covered annually if you're considered high risk: (1) You have diabetes OR (2) Family history of glaucoma OR (3)  aged 48 and older OR (3)  American aged 72 and older  3  Osteoporosis Screening: covered every 2 years if you meet one of the following conditions: (1) You're estrogen deficient and at risk for osteoporosis based off medical history and other findings; (2) Have a vertebral abnormality; (3) On glucocorticoid therapy for more than 3 months; (4) Have primary hyperparathyroidism; (5) On osteoporosis medications and need to assess response to drug therapy  · Last bone density test (DXA Scan): 08/28/2014   5  HIV Screening: covered annually if you're between the age of 15-65  Also covered annually if you are younger than 13 and older than 72 with risk factors for HIV infection  For pregnant patients, it is covered up to 3 times per pregnancy      Immunizations:  Immunization Recommendations Influenza Vaccine Annual influenza vaccination during flu season is recommended for all persons aged >= 6 months who do not have contraindications   Pneumococcal Vaccine (Prevnar and Pneumovax)  * Prevnar = PCV13  * Pneumovax = PPSV23   Adults 25-60 years old: 1-3 doses may be recommended based on certain risk factors  Adults 72 years old: Prevnar (PCV13) vaccine recommended followed by Pneumovax (PPSV23) vaccine  If already received PPSV23 since turning 65, then PCV13 recommended at least one year after PPSV23 dose  Hepatitis B Vaccine 3 dose series if at intermediate or high risk (ex: diabetes, end stage renal disease, liver disease)   Tetanus (Td) Vaccine - COST NOT COVERED BY MEDICARE PART B Following completion of primary series, a booster dose should be given every 10 years to maintain immunity against tetanus  Td may also be given as tetanus wound prophylaxis  Tdap Vaccine - COST NOT COVERED BY MEDICARE PART B Recommended at least once for all adults  For pregnant patients, recommended with each pregnancy  Shingles Vaccine (Shingrix) - COST NOT COVERED BY MEDICARE PART B  2 shot series recommended in those aged 48 and above     Health Maintenance Due:      Topic Date Due    Hepatitis C Screening  1950    MAMMOGRAM  06/09/2016    CRC Screening: Colonoscopy  07/15/2020     Immunizations Due:      Topic Date Due    DTaP,Tdap,and Td Vaccines (1 - Tdap) 04/16/1969    HEPATITIS B VACCINES (3 of 3 - Risk 3-dose series) 11/09/2015    Pneumococcal Vaccine: 65+ Years (2 of 2 - PPSV23) 09/13/2017    INFLUENZA VACCINE  07/01/2019     Advance Directives   What are advance directives? Advance directives are legal documents that state your wishes and plans for medical care  These plans are made ahead of time in case you lose your ability to make decisions for yourself  Advance directives can apply to any medical decision, such as the treatments you want, and if you want to donate organs     What are the types of advance directives? There are many types of advance directives, and each state has rules about how to use them  You may choose a combination of any of the following:  · Living will: This is a written record of the treatment you want  You can also choose which treatments you do not want, which to limit, and which to stop at a certain time  This includes surgery, medicine, IV fluid, and tube feedings  · Durable power of  for healthcare [facility]): This is a written record that states who you want to make healthcare choices for you when you are unable to make them for yourself  This person, called a proxy, is usually a family member or a friend  You may choose more than 1 proxy  · Do not resuscitate (DNR) order:  A DNR order is used in case your heart stops beating or you stop breathing  It is a request not to have certain forms of treatment, such as CPR  A DNR order may be included in other types of advance directives  · Medical directive: This covers the care that you want if you are in a coma, near death, or unable to make decisions for yourself  You can list the treatments you want for each condition  Treatment may include pain medicine, surgery, blood transfusions, dialysis, IV or tube feedings, and a ventilator (breathing machine)  · Values history: This document has questions about your views, beliefs, and how you feel and think about life  This information can help others choose the care that you would choose  Why are advance directives important? An advance directive helps you control your care  Although spoken wishes may be used, it is better to have your wishes written down  Spoken wishes can be misunderstood, or not followed  Treatments may be given even if you do not want them  An advance directive may make it easier for your family to make difficult choices about your care  Urinary Incontinence   Urinary incontinence (UI)  is when you lose control of your bladder   UI develops because your bladder cannot store or empty urine properly  The 3 most common types of UI are stress incontinence, urge incontinence, or both  Medicines:   · May be given to help strengthen your bladder control  Report any side effects of medication to your healthcare provider  Do pelvic muscle exercises often:  Your pelvic muscles help you stop urinating  Squeeze these muscles tight for 5 seconds, then relax for 5 seconds  Gradually work up to squeezing for 10 seconds  Do 3 sets of 15 repetitions a day, or as directed  This will help strengthen your pelvic muscles and improve bladder control  Train your bladder:  Go to the bathroom at set times, such as every 2 hours, even if you do not feel the urge to go  You can also try to hold your urine when you feel the urge to go  For example, hold your urine for 5 minutes when you feel the urge to go  As that becomes easier, hold your urine for 10 minutes  Self-care:   · Keep a UI record  Write down how often you leak urine and how much you leak  Make a note of what you were doing when you leaked urine  · Drink liquids as directed  You may need to limit the amount of liquid you drink to help control your urine leakage  Do not drink any liquid right before you go to bed  Limit or do not have drinks that contain caffeine or alcohol  · Prevent constipation  Eat a variety of high-fiber foods  Good examples are high-fiber cereals, beans, vegetables, and whole-grain breads  Walking is the best way to trigger your intestines to have a bowel movement  · Exercise regularly and maintain a healthy weight  Weight loss and exercise will decrease pressure on your bladder and help you control your leakage  · Use a catheter as directed  to help empty your bladder  A catheter is a tiny, plastic tube that is put into your bladder to drain your urine  · Go to behavior therapy as directed    Behavior therapy may be used to help you learn to control your urge to urinate  Weight Management   Why it is important to manage your weight:  Being overweight increases your risk of health conditions such as heart disease, high blood pressure, type 2 diabetes, and certain types of cancer  It can also increase your risk for osteoarthritis, sleep apnea, and other respiratory problems  Aim for a slow, steady weight loss  Even a small amount of weight loss can lower your risk of health problems  How to lose weight safely:  A safe and healthy way to lose weight is to eat fewer calories and get regular exercise  You can lose up about 1 pound a week by decreasing the number of calories you eat by 500 calories each day  Healthy meal plan for weight management:  A healthy meal plan includes a variety of foods, contains fewer calories, and helps you stay healthy  A healthy meal plan includes the following:  · Eat whole-grain foods more often  A healthy meal plan should contain fiber  Fiber is the part of grains, fruits, and vegetables that is not broken down by your body  Whole-grain foods are healthy and provide extra fiber in your diet  Some examples of whole-grain foods are whole-wheat breads and pastas, oatmeal, brown rice, and bulgur  · Eat a variety of vegetables every day  Include dark, leafy greens such as spinach, kale, darryl greens, and mustard greens  Eat yellow and orange vegetables such as carrots, sweet potatoes, and winter squash  · Eat a variety of fruits every day  Choose fresh or canned fruit (canned in its own juice or light syrup) instead of juice  Fruit juice has very little or no fiber  · Eat low-fat dairy foods  Drink fat-free (skim) milk or 1% milk  Eat fat-free yogurt and low-fat cottage cheese  Try low-fat cheeses such as mozzarella and other reduced-fat cheeses  · Choose meat and other protein foods that are low in fat  Choose beans or other legumes such as split peas or lentils   Choose fish, skinless poultry (chicken or turkey), or lean cuts of red meat (beef or pork)  Before you cook meat or poultry, cut off any visible fat  · Use less fat and oil  Try baking foods instead of frying them  Add less fat, such as margarine, sour cream, regular salad dressing and mayonnaise to foods  Eat fewer high-fat foods  Some examples of high-fat foods include french fries, doughnuts, ice cream, and cakes  · Eat fewer sweets  Limit foods and drinks that are high in sugar  This includes candy, cookies, regular soda, and sweetened drinks  Exercise:  Exercise at least 30 minutes per day on most days of the week  Some examples of exercise include walking, biking, dancing, and swimming  You can also fit in more physical activity by taking the stairs instead of the elevator or parking farther away from stores  Ask your healthcare provider about the best exercise plan for you  © Copyright [employer] 2018 Information is for End User's use only and may not be sold, redistributed or otherwise used for commercial purposes   All illustrations and images included in CareNotes® are the copyrighted property of [employer]  or [address] [place] [address]

## 2019-10-21 NOTE — PROGRESS NOTES
Assessment and Plan:     Problem List Items Addressed This Visit     None           Preventive health issues were discussed with patient, and age appropriate screening tests were ordered as noted in patient's After Visit Summary  Personalized health advice and appropriate referrals for health education or preventive services given if needed, as noted in patient's After Visit Summary  History of Present Illness:     Patient presents for Medicare Annual Wellness visit    She needs a pneumovax 23 and a mammogram       Patient Care Team:  Beryl Keith DO as PCP - General  MD Oliva Huang MD Pandora Pae, MD Scottie Farrow, MD Daril Foreman, MD Billye Michaelis, MD     Problem List:     Patient Active Problem List   Diagnosis    Lung [facility] status, bilateral [facility] [address] [facility]    ROSEANNE (acute kidney injury) ([facility] [address] [facility]    Neutropenia, drug-induced ([facility] [address] )    Anemia    Right shoulder pain    Spinal stenosis    Stage 3 chronic kidney disease ([facility] [address] [facility]    Tremor    Acute embolism and thrombosis of deep vein of both distal lower extremities (HCC)    Chronic kidney disease, stage IV (severe) ([facility] [address] )      Past Medical and Surgical History:     Past Medical History:   Diagnosis Date    Anxiety     Arthritis     Blood clot of neck vein     R side    H/O pulmonary fibrosis     High cholesterol     Kidney disease     Lung disease     Pneumonitis     Spinal stenosis      Past Surgical History:   Procedure Laterality Date    APPENDECTOMY      KNEE ARTHROSCOPY      LUNG TRANSPLANT, DOUBLE      Pt had fibrosis and pneumonitis    TONSILLECTOMY        Family History:     Family History   Problem Relation Age of Onset    Hypertension Mother     Skin cancer Mother    Martell Froy COPD Father     Hypertension Father         ESSENTIAL    Lung cancer Father     Lupus Sister     Lupus Other       Social History:     Social History     Socioeconomic History    Marital status: /Civil Union     Spouse name: Not on file    Number of children: Not on file    Years of education: Not on file    Highest education level: Not on file   Occupational History    Not on file   Social Needs    Financial resource strain: Not on file    Food insecurity:     Worry: Not on file     Inability: Not on file    Transportation needs:     Medical: Not on file     Non-medical: Not on file   Tobacco Use    Smoking status: Never Smoker    Smokeless tobacco: Never Used   Substance and Sexual Activity    Alcohol use: No    Drug use: No    Sexual activity: Not Currently   Lifestyle    Physical activity:     Days per week: Not on file     Minutes per session: Not on file    Stress: Not on file   Relationships    Social connections:     Talks on phone: Not on file     Gets together: Not on file     Attends [employer] service: Not on file     Active member of club or organization: Not on file     Attends meetings of clubs or organizations: Not on file     Relationship status: Not on file    Intimate partner violence:     Fear of current or ex partner: Not on file     Emotionally abused: Not on file     Physically abused: Not on file     Forced sexual activity: Not on file   Other Topics Concern    Not on file   Social History Narrative    DAILY COFFEE CONSUMPTION    DAILY COLA CONSUMPTION    DAILY TEA CONSUMPTION       Medications and Allergies:     Current Outpatient Medications   Medication Sig Dispense Refill    atovaquone (MEPRON) 750 mg/5 mL suspension Take 750 mg by mouth 2 (two) times a day   Calcium Carb-Cholecalciferol (CALCIUM 600+D) 600-800 MG-UNIT TABS Take by mouth 2 (two) times a day      cholestyramine (QUESTRAN) 4 g packet MIX AND DRINK 1 PACKET(4 GRAMS) BY MOUTH DAILY 90 packet 3    citalopram (CeleXA) 10 mg tablet Take 10 mg by mouth daily      Cyanocobalamin (HM SUPER VITAMIN B12 PO) Take 1 tablet by mouth daily          cycloSPORINE (RESTASIS) 0 05 % ophthalmic emulsion 1 drop 2 (two) times a day      diclofenac sodium (VOLTAREN) 1 % Apply 2 g topically 4 (four) times a day 100 g 3    diphenoxylate-atropine (LOMOTIL) 2 5-0 025 mg per tablet Take 1 tablet by mouth 4 (four) times a day as needed for diarrhea 30 tablet 0    docusate sodium (COLACE) 100 mg capsule Take 100 mg by mouth as needed for constipation   gabapentin (NEURONTIN) 600 MG tablet Take 300 mg by mouth 2 (two) times a day        lidocaine (XYLOCAINE) 5 % ointment Apply topically 3 (three) times a day as needed for mild pain 35 44 g 1    Magnesium Cl-Calcium Carbonate (SLOW-MAG PO) Take 138 mg by mouth daily   metoprolol tartrate (LOPRESSOR) 50 mg tablet Take 25 mg by mouth daily        Multiple Vitamins-Minerals (MULTIPLE VITAMINS/WOMENS PO) Take by mouth daily   mycophenolate (CELLCEPT) 250 mg capsule Take 1,000 mg by mouth 2 (two) times a day        pantoprazole (PROTONIX) 40 mg tablet Take 40 mg by mouth daily   pravastatin (PRAVACHOL) 40 mg tablet Take 40 mg by mouth daily   predniSONE 5 mg tablet Take 5 mg by mouth daily   risedronate (ACTONEL) 5 MG tablet Take 5 mg by mouth every morning before breakfast with water on empty stomach, nothing by mouth or lie down for next 30 minutes   Tacrolimus (PROGRAF PO) Take 3 mg by mouth 2 (two) times a day   05 mg twice daily      traMADol (ULTRAM) 50 mg tablet Take 50 mg by mouth daily        Vitamin D, Ergocalciferol, 2000 units CAPS 2 (two) times a day      zolpidem (AMBIEN) 5 mg tablet Take 1 tablet (5 mg total) by mouth daily at bedtime as needed for sleep 30 tablet 3     No current facility-administered medications for this visit        Allergies   Allergen Reactions    Zofran [Ondansetron] Hallucinations    Bactrim [Sulfamethoxazole-Trimethoprim] Rash      Immunizations:     Immunization History   Administered Date(s) Administered    Fluzone Split Quad 0 25 mL 09/13/2016    Hep B, adult 05/21/2015, 07/09/2015    INFLUENZA 12/03/2015, 09/14/2017, 10/10/2018    Pneumococcal Conjugate 13-Valent 09/13/2016    Tuberculin Skin Test-PPD Intradermal 05/05/2015    Zoster Vaccine Recombinant 07/25/2019      Health Maintenance:         Topic Date Due    Hepatitis C Screening  1950    MAMMOGRAM  06/09/2016    CRC Screening: Colonoscopy  07/15/2020         Topic Date Due    DTaP,Tdap,and Td Vaccines (1 - Tdap) 04/16/1969    HEPATITIS B VACCINES (3 of 3 - Risk 3-dose series) 11/09/2015    Pneumococcal Vaccine: 65+ Years (2 of 2 - PPSV23) 09/13/2017    INFLUENZA VACCINE  07/01/2019      Medicare Health Risk Assessment:     LMP  (LMP Unknown)      Megan Locke is here for her Subsequent Wellness visit  Last Medicare Wellness visit information reviewed, patient interviewed, no change since last AWV  Health Risk Assessment:   Patient rates overall health as good  Patient feels that their physical health rating is same  Eyesight was rated as same  Hearing was rated as same  Patient feels that their emotional and mental health rating is slightly worse  Pain experienced in the last 7 days has been some  Patient's pain rating has been 6/10  Patient states that she has experienced no weight loss or gain in last 6 months  Depression Screening:   PHQ-2 Score: 2      Fall Risk Screening: In the past year, patient has experienced: no history of falling in past year      Urinary Incontinence Screening:   Patient has leaked urine accidently in the last six months  Home Safety:  Patient has trouble with stairs inside or outside of their home  Patient has working smoke alarms and has working carbon monoxide detector  Home safety hazards include: none  Nutrition:   Current diet is Regular and Limited junk food  Medications:   Patient is currently taking over-the-counter supplements  OTC medications include: see medication list  Patient is able to manage medications       Activities of Daily Living (ADLs)/Instrumental Activities of Daily Living (IADLs): Walk and transfer into and out of bed and chair?: Yes  Dress and groom yourself?: Yes    Bathe or shower yourself?: No    Feed yourself? Yes  Do your laundry/housekeeping?: Yes  Manage your money, pay your bills and track your expenses?: No  Make your own meals?: Yes    Do your own shopping?: Yes    Previous Hospitalizations:   Any hospitalizations or ED visits within the last 12 months?: No      Advance Care Planning:   Living will: Yes    Durable POA for healthcare: Yes    Advanced directive: Yes    Advanced directive counseling given: Yes    Five wishes given: Yes    Patient declined ACP directive: No    End of Life Decisions reviewed with patient: Yes    Provider agrees with end of life decisions: Yes      PREVENTIVE SCREENINGS      Cardiovascular Screening:    General: Screening Current      Diabetes Screening:     General: Screening Current      Colorectal Cancer Screening:     General: Screening Current      Breast Cancer Screening:     General: Risks and Benefits Discussed    Due for: Mammogram        Cervical Cancer Screening:    General: Screening Not Indicated      Osteoporosis Screening:    General: Screening Current      Abdominal Aortic Aneurysm (AAA) Screening:        General: Screening Not Indicated      Lung Cancer Screening:     General: Screening Current      Hepatitis C Screening:    General: Screening Current    Patient ID: Dara Daley is a 68 y o  female  HPI: 68 y  o female presents for medicare wellness visit  She is 4 years s/p lung transplant and following with various specialists      SUBJECTIVE    Family History   Problem Relation Age of Onset    Hypertension Mother     Skin cancer Mother     COPD Father     Hypertension Father         ESSENTIAL    Lung cancer Father     Lupus Sister     Lupus Other      Social History     Socioeconomic History    Marital status: /Civil Union     Spouse name: Not on file    Number of children: Not on file    Years of education: Not on file    Highest education level: Not on file   Occupational History    Not on file   Social Needs    Financial resource strain: Not on file    Food insecurity:     Worry: Not on file     Inability: Not on file    Transportation needs:     Medical: Not on file     Non-medical: Not on file   Tobacco Use    Smoking status: Never Smoker    Smokeless tobacco: Never Used   Substance and Sexual Activity    Alcohol use: No    Drug use: No    Sexual activity: Not Currently   Lifestyle    Physical activity:     Days per week: Not on file     Minutes per session: Not on file    Stress: Not on file   Relationships    Social connections:     Talks on phone: Not on file     Gets together: Not on file     Attends [employer] service: Not on file     Active member of club or organization: Not on file     Attends meetings of clubs or organizations: Not on file     Relationship status: Not on file    Intimate partner violence:     Fear of current or ex partner: Not on file     Emotionally abused: Not on file     Physically abused: Not on file     Forced sexual activity: Not on file   Other Topics Concern    Not on file   Social History Narrative    DAILY COFFEE CONSUMPTION    DAILY COLA CONSUMPTION    DAILY TEA CONSUMPTION     Past Medical History:   Diagnosis Date    Anxiety     Arthritis     Blood clot of neck vein     R side    H/O pulmonary fibrosis     High cholesterol     Kidney disease     Lung disease     Pneumonitis     Spinal stenosis      Past Surgical History:   Procedure Laterality Date    APPENDECTOMY      KNEE ARTHROSCOPY      LUNG TRANSPLANT, DOUBLE      Pt had fibrosis and pneumonitis    TONSILLECTOMY       Allergies   Allergen Reactions    Zofran [Ondansetron] Hallucinations    Bactrim [Sulfamethoxazole-Trimethoprim] Rash       Current Outpatient Medications:     atovaquone (MEPRON) 750 mg/5 mL suspension, Take 750 mg by mouth 2 (two) times a day , Disp: , Rfl:    Calcium Carb-Cholecalciferol (CALCIUM 600+D) 600-800 MG-UNIT TABS, Take by mouth 2 (two) times a day, Disp: , Rfl:     cholestyramine (QUESTRAN) 4 g packet, MIX AND DRINK 1 PACKET(4 GRAMS) BY MOUTH DAILY, Disp: 90 packet, Rfl: 3    citalopram (CeleXA) 10 mg tablet, Take 10 mg by mouth daily, Disp: , Rfl:     Cyanocobalamin (HM SUPER VITAMIN B12 PO), Take 1 tablet by mouth daily  , Disp: , Rfl:     cycloSPORINE (RESTASIS) 0 05 % ophthalmic emulsion, 1 drop 2 (two) times a day, Disp: , Rfl:     diclofenac sodium (VOLTAREN) 1 %, Apply 2 g topically 4 (four) times a day, Disp: 100 g, Rfl: 3    diphenoxylate-atropine (LOMOTIL) 2 5-0 025 mg per tablet, Take 1 tablet by mouth 4 (four) times a day as needed for diarrhea, Disp: 30 tablet, Rfl: 0    docusate sodium (COLACE) 100 mg capsule, Take 100 mg by mouth as needed for constipation  , Disp: , Rfl:     gabapentin (NEURONTIN) 600 MG tablet, Take 300 mg by mouth 2 (two) times a day  , Disp: , Rfl:     lidocaine (XYLOCAINE) 5 % ointment, Apply topically 3 (three) times a day as needed for mild pain, Disp: 35 44 g, Rfl: 1    Magnesium Cl-Calcium Carbonate (SLOW-MAG PO), Take 138 mg by mouth daily  , Disp: , Rfl:     metoprolol tartrate (LOPRESSOR) 50 mg tablet, Take 25 mg by mouth daily  , Disp: , Rfl:     Multiple Vitamins-Minerals (MULTIPLE VITAMINS/WOMENS PO), Take by mouth daily  , Disp: , Rfl:     mycophenolate (CELLCEPT) 250 mg capsule, Take 1,000 mg by mouth 2 (two) times a day  , Disp: , Rfl:     pantoprazole (PROTONIX) 40 mg tablet, Take 40 mg by mouth daily  , Disp: , Rfl:     pravastatin (PRAVACHOL) 40 mg tablet, Take 40 mg by mouth daily  , Disp: , Rfl:     predniSONE 5 mg tablet, Take 5 mg by mouth daily  , Disp: , Rfl:     risedronate (ACTONEL) 5 MG tablet, Take 5 mg by mouth every morning before breakfast with water on empty stomach, nothing by mouth or lie down for next 30 minutes  , Disp: , Rfl:     Tacrolimus (PROGRAF PO), Take 2 mg by mouth 2 (two) times a day , Disp: , Rfl:     traMADol (ULTRAM) 50 mg tablet, Take 50 mg by mouth daily  , Disp: , Rfl:     Vitamin D, Ergocalciferol, 2000 units CAPS, 2 (two) times a day, Disp: , Rfl:     zolpidem (AMBIEN) 5 mg tablet, Take 1 tablet (5 mg total) by mouth daily at bedtime as needed for sleep, Disp: 30 tablet, Rfl: 3    Review of Systems  Constitutional:     Denies fever, chills ,fatigue ,weakness ,weight loss, weight gain     ENT: Denies earache ,loss of hearing ,nosebleed, nasal discharge,nasal congestion ,sore throat ,hoarseness  Pulmonary: Denies shortness of breath ,cough  ,dyspnea on exertion, orthopnea  ,PND   Cardiovascular:  Denies bradycardia , tachycardia  ,palpations, lower extremity edema leg, claudication  Breast:  Denies new or changing breast lumps ,nipple discharge ,nipple changes  Abdomen:  Denies abdominal pain , anorexia , indigestion, nausea, vomiting, constipation, diarrhea  Musculoskeletal: Denies myalgias, arthralgias, joint swelling, joint stiffness , limb pain, limb swelling  Gu: denies dysuria, polyuria  Skin: Denies skin rash, skin lesion, skin wound, itching, dry skin  Neuro: Denies headache, numbness, tingling, confusion, loss of consciousness, dizziness, vertigo  Psychiatric: Denies feelings of depression, suicidal ideation, anxiety, sleep disturbances    OBJECTIVE  /74   Pulse 70   Temp 99 1 °F (37 3 °C)   Ht 5' 4" (1 626 m)   Wt 76 2 kg (168 lb)   LMP  (LMP Unknown)   BMI 28 84 kg/m²   Constitutional:   NAD, well appearing and well nourished      ENT:   Conjunctiva and lids: no injection, edema, or discharge     Pupils and iris: DIMA bilaterally    External inspection of ears and nose: normal without deformities or discharge  Otoscopic exam: Canals patent without erythema  Nasal mucosa, septum and turbinates: Normal or edema or discharge         Oropharynx:  Moist mucosa, normal tongue and tonsils without lesions   No erythema Pulmonary:Respiratory effort normal rate and rhythm, no increased work of breathing  Auscultation of lungs:  Clear bilaterally with no adventitious breath sounds      Cardiovascular: regular rate and rhythm, S1 and S2, no murmur, no edema and/or varicosities of LE      Abdomen: Soft and non-distended     Positive bowel sounds    No heptomegaly or splenomegaly      Gu: no suprapubic tenderness or CVA tenderness, no urethral discharge  Lymphatic:  No anterior or posterior cervical lymphadenopathy         Musculoskeletal:  Gait and station: Normal gait      Digits and nails normal without clubbing or cyanosis       Inspection/palpation of joints, bones, and muscles:  No joint tenderness, swelling, full active and passive range of motion       Skin: Normal skin turgor and no rashes      Neuro:    Normal  CN 2-12     Normal reflexes     Normal sensation    Psych:   alert and oriented to person, place and time     normal mood and affect       Assessment/Plan:Diagnoses and all orders for this visit:    Medicare annual wellness visit, subsequent    Chronic kidney disease, stage IV (severe) ([facility] [address] )    Acute embolism and thrombosis of deep vein of both distal lower extremities ([facility] [address] )    Need for vaccination  -     PNEUMOCOCCAL POLYSACCHARIDE VACCINE 23-VALENT =>1YO SQ IM        Reviewed with patient plan to treat with above plan      Patient instructed to call in 72 hours if not feeling better or if symptoms worsen    Rupa Paddy, DO

## 2019-10-23 ENCOUNTER — HOSPITAL ENCOUNTER (OUTPATIENT)
Dept: INFUSION CENTER | Facility: CLINIC | Age: 69
Discharge: HOME | End: 2019-10-23
Payer: MEDICARE

## 2019-10-23 DIAGNOSIS — N18.9 ANEMIA DUE TO CHRONIC KIDNEY DISEASE, UNSPECIFIED CKD STAGE: Primary | ICD-10-CM

## 2019-10-23 DIAGNOSIS — D63.1 ANEMIA DUE TO CHRONIC KIDNEY DISEASE, UNSPECIFIED CKD STAGE: Primary | ICD-10-CM

## 2019-10-23 PROCEDURE — 96372 THER/PROPH/DIAG INJ SC/IM: CPT

## 2019-10-23 RX ADMIN — DARBEPOETIN ALFA 100 MCG: 100 INJECTION, SOLUTION INTRAVENOUS; SUBCUTANEOUS at 14:10

## 2019-10-23 NOTE — PROGRESS NOTES
Pt resting with no complaints  Pt's most recent bloodwork done 10/7/19 shows hgb of 8 8  Spoke to Brittanie from Dr Montero Flow office; okay to use that bloodwork for today's shot  Aranesp given in [place] without issue  Pt declined AVS  Next appt reviewed c/ pt

## 2019-11-06 ENCOUNTER — HOSPITAL ENCOUNTER (OUTPATIENT)
Dept: INFUSION CENTER | Facility: CLINIC | Age: 69
Discharge: HOME | End: 2019-11-06
Payer: MEDICARE

## 2019-11-06 VITALS
RESPIRATION RATE: 18 BRPM | OXYGEN SATURATION: 98 % | DIASTOLIC BLOOD PRESSURE: 76 MMHG | TEMPERATURE: 98.7 F | SYSTOLIC BLOOD PRESSURE: 110 MMHG | HEART RATE: 76 BPM

## 2019-11-06 DIAGNOSIS — N18.9 ANEMIA DUE TO CHRONIC KIDNEY DISEASE, UNSPECIFIED CKD STAGE: ICD-10-CM

## 2019-11-06 DIAGNOSIS — N18.30 STAGE 3 CHRONIC KIDNEY DISEASE (HCC): Primary | ICD-10-CM

## 2019-11-06 DIAGNOSIS — D63.1 ANEMIA DUE TO CHRONIC KIDNEY DISEASE, UNSPECIFIED CKD STAGE: ICD-10-CM

## 2019-11-06 PROCEDURE — 96365 THER/PROPH/DIAG IV INF INIT: CPT

## 2019-11-06 RX ORDER — SODIUM CHLORIDE 9 MG/ML
20 INJECTION, SOLUTION INTRAVENOUS ONCE
Status: COMPLETED | OUTPATIENT
Start: 2019-11-06 | End: 2019-11-06

## 2019-11-06 RX ORDER — SODIUM CHLORIDE 9 MG/ML
20 INJECTION, SOLUTION INTRAVENOUS ONCE
Status: CANCELLED | OUTPATIENT
Start: 2019-12-04

## 2019-11-06 RX ADMIN — FERUMOXYTOL 510 MG: 510 INJECTION INTRAVENOUS at 15:24

## 2019-11-06 RX ADMIN — SODIUM CHLORIDE 20 ML/HR: 0.9 INJECTION, SOLUTION INTRAVENOUS at 15:08

## 2019-11-06 NOTE — PLAN OF CARE
Problem: Potential for Falls  Goal: Patient will remain free of falls  Description  INTERVENTIONS:  - Assess patient frequently for physical needs  -  Identify cognitive and physical deficits and behaviors that affect risk of falls    -  [place] fall precautions as indicated by assessment   - Educate patient/family on patient safety including physical limitations  - Instruct patient to call for assistance with activity based on assessment  - Modify environment to reduce risk of injury  - Consider OT/PT consult to assist with strengthening/mobility  Outcome: Progressing

## 2019-11-20 ENCOUNTER — HOSPITAL ENCOUNTER (OUTPATIENT)
Dept: INFUSION CENTER | Facility: CLINIC | Age: 69
Discharge: HOME | End: 2019-11-20
Payer: MEDICARE

## 2019-11-20 VITALS — DIASTOLIC BLOOD PRESSURE: 63 MMHG | SYSTOLIC BLOOD PRESSURE: 131 MMHG

## 2019-11-20 DIAGNOSIS — N18.9 ANEMIA DUE TO CHRONIC KIDNEY DISEASE, UNSPECIFIED CKD STAGE: Primary | ICD-10-CM

## 2019-11-20 DIAGNOSIS — D63.1 ANEMIA DUE TO CHRONIC KIDNEY DISEASE, UNSPECIFIED CKD STAGE: Primary | ICD-10-CM

## 2019-11-20 PROCEDURE — 96372 THER/PROPH/DIAG INJ SC/IM: CPT

## 2019-11-20 RX ADMIN — DARBEPOETIN ALFA 100 MCG: 100 INJECTION, SOLUTION INTRAVENOUS; SUBCUTANEOUS at 14:18

## 2019-11-20 NOTE — PROGRESS NOTES
Pt  offers no complaints  Hgb 8 8 on 11/4/19   Aranesp admin  SQ in [place] without incident  AVS given  Next appt  confirmed

## 2019-12-04 ENCOUNTER — HOSPITAL ENCOUNTER (OUTPATIENT)
Dept: INFUSION CENTER | Facility: CLINIC | Age: 69
Discharge: HOME | End: 2019-12-04
Payer: MEDICARE

## 2019-12-04 VITALS
SYSTOLIC BLOOD PRESSURE: 98 MMHG | OXYGEN SATURATION: 98 % | RESPIRATION RATE: 18 BRPM | DIASTOLIC BLOOD PRESSURE: 48 MMHG | HEART RATE: 74 BPM | TEMPERATURE: 98.6 F

## 2019-12-04 DIAGNOSIS — N18.30 STAGE 3 CHRONIC KIDNEY DISEASE (HCC): Primary | ICD-10-CM

## 2019-12-04 DIAGNOSIS — D63.1 ANEMIA DUE TO CHRONIC KIDNEY DISEASE, UNSPECIFIED CKD STAGE: ICD-10-CM

## 2019-12-04 DIAGNOSIS — N18.9 ANEMIA DUE TO CHRONIC KIDNEY DISEASE, UNSPECIFIED CKD STAGE: ICD-10-CM

## 2019-12-04 PROCEDURE — 96365 THER/PROPH/DIAG IV INF INIT: CPT

## 2019-12-04 RX ORDER — SODIUM CHLORIDE 9 MG/ML
20 INJECTION, SOLUTION INTRAVENOUS ONCE
Status: COMPLETED | OUTPATIENT
Start: 2019-12-04 | End: 2019-12-04

## 2019-12-04 RX ORDER — SODIUM CHLORIDE 9 MG/ML
20 INJECTION, SOLUTION INTRAVENOUS ONCE
Status: CANCELLED | OUTPATIENT
Start: 2020-01-02

## 2019-12-04 RX ADMIN — SODIUM CHLORIDE 20 ML/HR: 0.9 INJECTION, SOLUTION INTRAVENOUS at 15:00

## 2019-12-04 RX ADMIN — FERUMOXYTOL 510 MG: 510 INJECTION INTRAVENOUS at 15:23

## 2019-12-04 NOTE — PROGRESS NOTES
Pt to clinic for feraheme infusion, tolerated well with no complaints, aware of next appointment, avs printed

## 2019-12-04 NOTE — PLAN OF CARE
Problem: Potential for Falls  Goal: Patient will remain free of falls  Description  INTERVENTIONS:  - Assess patient frequently for physical needs  -  Identify cognitive and physical deficits and behaviors that affect risk of falls    -  [place] fall precautions as indicated by assessment   - Educate patient/family on patient safety including physical limitations  - Instruct patient to call for assistance with activity based on assessment  - Modify environment to reduce risk of injury  - Consider OT/PT consult to assist with strengthening/mobility  Outcome: Progressing

## 2019-12-18 ENCOUNTER — HOSPITAL ENCOUNTER (OUTPATIENT)
Dept: INFUSION CENTER | Facility: CLINIC | Age: 69
Discharge: HOME | End: 2019-12-18
Payer: MEDICARE

## 2019-12-18 VITALS — SYSTOLIC BLOOD PRESSURE: 134 MMHG | DIASTOLIC BLOOD PRESSURE: 64 MMHG

## 2019-12-18 DIAGNOSIS — D63.1 ANEMIA DUE TO CHRONIC KIDNEY DISEASE, UNSPECIFIED CKD STAGE: Primary | ICD-10-CM

## 2019-12-18 DIAGNOSIS — N18.9 ANEMIA DUE TO CHRONIC KIDNEY DISEASE, UNSPECIFIED CKD STAGE: Primary | ICD-10-CM

## 2019-12-18 PROCEDURE — 96372 THER/PROPH/DIAG INJ SC/IM: CPT

## 2019-12-18 RX ORDER — MYCOPHENOLIC ACID 360 MG/1
720 TABLET, DELAYED RELEASE ORAL 2 TIMES DAILY
COMMUNITY

## 2019-12-18 RX ADMIN — DARBEPOETIN ALFA 100 MCG: 100 INJECTION, SOLUTION INTRAVENOUS; SUBCUTANEOUS at 14:46

## 2019-12-18 NOTE — PATIENT INSTRUCTIONS
December 2019 Sunday Monday Tuesday Wednesday Thursday Friday Saturday   1     2     3     4    INF IV MED IRON OTHER   2:30 PM   (60 min )   AN INF CHAIR [address] 5     6     7          Cycle 1, Treatment 10  + Add'l treatments      8     9     10     11     12     13     14                15     16     17     18    INF COLONY STIMULATING FACTORS   2:00 PM   (30 min )   AN INF QUICK CHAIR Sara Mares [address] [facility] 19     20     21            Add'l treatments      22     23     24     25     26     27     28                29     30     31                                         Treatment Details       12/4/2019 - Cycle 1, Treatment 10 + Additional treatments      Supportive Care: [facility] PROVIDER COMMUNICATION4, ferumnormatojody Rothman)      Supportive Care: darbepoetin pita (ARANESP)    12/18/2019 - Additional treatments      Supportive Care: darbepoetin pita (ARANESP)        January 2020 Sunday Monday Tuesday Wednesday Thursday Friday Saturday                  1     2    INF IV MED IRON OTHER   2:30 PM   (60 min )   [facility]     4                5     6     7     8     9     10     11                12     13     14     15    INF COLONY STIMULATING FACTORS   2:00 PM   (30 min )   AN INF QUICK CHAIR [facility] 11 16     17     18                19     20     21     22    FOLLOW UP PG   1:05 PM   (20 min )   Tonia Whalen MD PhD   [facility] [place] 23     24     25                26     32    BOB SCREENING BILAT W CAD   1:30 PM   (30 min )   AN MAMMO 1   [address] 83     34     70     56

## 2019-12-18 NOTE — PROGRESS NOTES
To Infusion Center for aranesp injection  Pt meets parameters  Offers no new complaints  Med list updated after visit to [facility] yesterday  AVS provided

## 2020-01-02 ENCOUNTER — HOSPITAL ENCOUNTER (OUTPATIENT)
Dept: INFUSION CENTER | Facility: CLINIC | Age: 70
Discharge: HOME | End: 2020-01-02
Payer: MEDICARE

## 2020-01-02 VITALS
TEMPERATURE: 98.4 F | SYSTOLIC BLOOD PRESSURE: 126 MMHG | HEART RATE: 74 BPM | RESPIRATION RATE: 18 BRPM | DIASTOLIC BLOOD PRESSURE: 56 MMHG | OXYGEN SATURATION: 98 %

## 2020-01-02 DIAGNOSIS — N18.9 ANEMIA DUE TO CHRONIC KIDNEY DISEASE, UNSPECIFIED CKD STAGE: ICD-10-CM

## 2020-01-02 DIAGNOSIS — N18.30 STAGE 3 CHRONIC KIDNEY DISEASE (HCC): Primary | ICD-10-CM

## 2020-01-02 DIAGNOSIS — D63.1 ANEMIA DUE TO CHRONIC KIDNEY DISEASE, UNSPECIFIED CKD STAGE: ICD-10-CM

## 2020-01-02 PROCEDURE — 96365 THER/PROPH/DIAG IV INF INIT: CPT

## 2020-01-02 RX ORDER — SODIUM CHLORIDE 9 MG/ML
20 INJECTION, SOLUTION INTRAVENOUS ONCE
Status: COMPLETED | OUTPATIENT
Start: 2020-01-02 | End: 2020-01-02

## 2020-01-02 RX ORDER — SODIUM CHLORIDE 9 MG/ML
20 INJECTION, SOLUTION INTRAVENOUS ONCE
Status: CANCELLED | OUTPATIENT
Start: 2020-01-29

## 2020-01-02 RX ADMIN — SODIUM CHLORIDE 20 ML/HR: 0.9 INJECTION, SOLUTION INTRAVENOUS at 15:45

## 2020-01-02 RX ADMIN — FERUMOXYTOL 510 MG: 510 INJECTION INTRAVENOUS at 15:53

## 2020-01-02 NOTE — PLAN OF CARE
Problem: Potential for Falls  Goal: Patient will remain free of falls  Description  INTERVENTIONS:  - Assess patient frequently for physical needs  -  Identify cognitive and physical deficits and behaviors that affect risk of falls  -  [place] fall precautions as indicated by assessment   - Educate patient/family on patient safety including physical limitations  - Instruct patient to call for assistance with activity based on assessment  - Modify environment to reduce risk of injury  - Consider OT/PT consult to assist with strengthening/mobility  Outcome: Progressing     Problem: Knowledge Deficit  Goal: Patient/family/caregiver demonstrates understanding of disease process, treatment plan, medications, and discharge instructions  Description  Complete learning assessment and assess knowledge base    Interventions:  - Provide teaching at level of understanding  - Provide teaching via preferred learning methods  Outcome: Progressing

## 2020-01-02 NOTE — PROGRESS NOTES
Pt to clinic for feraheme infusion, tolerated infusion without complications, ordered a 30 minute observation post-infusion, pt refused observation stating she has never sat for a 30 minute observation post-feraheme infusion, aware of next appointment, avs printed and reviewed

## 2020-01-15 ENCOUNTER — HOSPITAL ENCOUNTER (OUTPATIENT)
Dept: INFUSION CENTER | Facility: CLINIC | Age: 70
Discharge: HOME | End: 2020-01-15
Payer: MEDICARE

## 2020-01-15 DIAGNOSIS — D63.1 ANEMIA DUE TO CHRONIC KIDNEY DISEASE, UNSPECIFIED CKD STAGE: Primary | ICD-10-CM

## 2020-01-15 DIAGNOSIS — N18.9 ANEMIA DUE TO CHRONIC KIDNEY DISEASE, UNSPECIFIED CKD STAGE: Primary | ICD-10-CM

## 2020-01-15 PROCEDURE — 96372 THER/PROPH/DIAG INJ SC/IM: CPT

## 2020-01-15 RX ADMIN — DARBEPOETIN ALFA 100 MCG: 100 INJECTION, SOLUTION INTRAVENOUS; SUBCUTANEOUS at 14:23

## 2020-01-15 NOTE — PROGRESS NOTES
Blood pressure obtained, WNL  Not recorded  Labs reviewed from [place] Everywhere and patient meets parameters for aranesp today

## 2020-01-15 NOTE — PROGRESS NOTES
Patient came into infusion for Aranesp injection  Patients vitals WNL  Patient offers no complaints  Patient denies pain  Patient confirmed follow up appt  Patient declined AVS  Will continue to monitor

## 2020-02-04 ENCOUNTER — OFFICE VISIT (OUTPATIENT)
Dept: HEMATOLOGY ONCOLOGY | Facility: CLINIC | Age: 70
End: 2020-02-04
Payer: MEDICARE

## 2020-02-04 VITALS
HEART RATE: 86 BPM | TEMPERATURE: 98.6 F | WEIGHT: 154 LBS | HEIGHT: 64 IN | SYSTOLIC BLOOD PRESSURE: 142 MMHG | OXYGEN SATURATION: 94 % | BODY MASS INDEX: 26.29 KG/M2 | RESPIRATION RATE: 22 BRPM | DIASTOLIC BLOOD PRESSURE: 78 MMHG

## 2020-02-04 DIAGNOSIS — N18.9 ANEMIA IN CHRONIC KIDNEY DISEASE, UNSPECIFIED CKD STAGE: Primary | ICD-10-CM

## 2020-02-04 DIAGNOSIS — D63.1 ANEMIA IN CHRONIC KIDNEY DISEASE, UNSPECIFIED CKD STAGE: Primary | ICD-10-CM

## 2020-02-04 DIAGNOSIS — D70.2 NEUTROPENIA, DRUG-INDUCED (HCC): ICD-10-CM

## 2020-02-04 PROCEDURE — 4040F PNEUMOC VAC/ADMIN/RCVD: CPT | Performed by: INTERNAL MEDICINE

## 2020-02-04 PROCEDURE — 99214 OFFICE O/P EST MOD 30 MIN: CPT | Performed by: INTERNAL MEDICINE

## 2020-02-04 PROCEDURE — 3008F BODY MASS INDEX DOCD: CPT | Performed by: INTERNAL MEDICINE

## 2020-02-04 PROCEDURE — 1160F RVW MEDS BY RX/DR IN RCRD: CPT | Performed by: INTERNAL MEDICINE

## 2020-02-04 PROCEDURE — 1036F TOBACCO NON-USER: CPT | Performed by: INTERNAL MEDICINE

## 2020-02-04 RX ORDER — SODIUM CHLORIDE 9 MG/ML
20 INJECTION, SOLUTION INTRAVENOUS ONCE
Status: CANCELLED | OUTPATIENT
Start: 2020-02-05

## 2020-02-04 NOTE — PROGRESS NOTES
HEMATOLOGY / ONCOLOGY CLINIC NOTE    Primary Care Provider: Beryl Keith DO  Referring Provider:    MRN: [id number]  : 1950    Reason for Encounter:    Chief Complaint   Patient presents with    Follow-up     6 month          Hematology / Oncology History:     Lon Colvin is a 68 y o  female who came in for follow up  Anemia : Normocytic; multifactorial, likely drug (tacrolimus) related , and CKD  repeat work up showed no blood loss or hemolysis  Patient had a bone marrow biopsy in [facility] showed no major abnormalities per patient  Hemoglobin:  6 9  (),  7 2   (),  7 4 (2018),   8 1 ()  ,  7 5 (10/8/2018)    - 2018:  received 4  iv iron Venofer 300 mg iv weekly  ;   Aranesp 100 mcg weekly x 4   - :  Received 1 unit of PRBC transfusion  - 10/10 : plan to give feraheme 510 mg iv weekly x 2 , Aranesp 100 mcg weekly x 4      - 2019:  Plan to start Feraheme 500 mg IV every 4 weeks, Aranesp 100 mics subcu injection every 4 weeks  - 2020: Feraheme, Aranesp 200 mcg subcu injection alternate Q 2 w         current treatment:  As above      Interval History:     3/20 :  Patient came in for follow-up  Reported no bleeding  Strength stable  Jose dyspnea while walking a long distance  Has been reported patient still appears pale  Patient stopped following hematology in [facility]  :  Came in for follow-up  Reported doing well, no bleeding  Strengths is weak however stable  10/10 : Came in for follow-up  Reported feeling fatigued  No bleeding  No other complaints  4/10/2019:  Patient reported overall has been doing well  She feels the energy has been increasing  No bleeding  Reported her tremor has been getting worse, she scheduled to see a neurologist     2019:  Natalio Mcleod for follow-up  Reported doing well  Reported still having fatigue  No bleeding no other issues    Following transplant doctor, considered to further lower dose of tacrolimus  2/4/2020 : Came for follow-up  Reported overall has been doing okay, no bleeding  No other issues  Problem list:       Patient Active Problem List   Diagnosis    [facility] status[facility] [address] )    ROSEANNE (acute kidney injury) ([facility] [address] )    Neutropenia, drug-induced (HCC)    Anemia    Right shoulder pain    Spinal stenosis    Stage 3 chronic kidney disease (HCC)    Tremor    Acute embolism and thrombosis of deep vein of both distal lower extremities (HCC)    Chronic kidney disease, stage IV (severe) (HCC)       Assessment / Plan:       1  Anemia in chronic kidney disease, unspecified CKD stage   -  Macrocytic; multifactorial, likely drug (tacrolimus) - related , and CKD  repeat work up in 10/2017 showed no blood loss or hemolysis  Iron panel is normal, no bleeding  Suggesting the anemia is likely due to bone marrow suppression by transplant/tacrolimus      -       Had a good response to Feraheme and Aranesp  Since starting treatment in 04/2019  Hemoglobin increased from 8-10 to 11  However for the past 2 months,    Hemoglobin drop back to 8-9, no bleeding no clear cause  We decided increase dose of Aranesp from 100 to 200 mcg  Will follow patient 6 months  Made patient aware by increased dose of Aranesp, there is a small risk for DVT  Patient aware of the signs and symptoms, she will closely monitor  2, neutropenia is stable    3, high risk for iron def  - high risk for iron def due to on Procrit  Need iv iron support  25    minutes were spent on this visit  All questions answered to satisfaction; Advised pt to call if there is any further questions  PHYSICIAL EXAMINATION:       Vital Signs:   [unfilled]  Body mass index is 26 43 kg/m²  Body surface area is 1 75 meters squared  Still appears pale otherwise no major finding on physical examination  Appears well, no new findings on examination      GEN: Alert, awake oriented x3, in no acute distress;     Wearing mask, appears pale  HEENT- No pallor, icterus, cyanosis, no oral mucosal lesions,   LAD - no palpable cervical, clavicle, axillary, inguinal LAD  Heart- normal S1 S2, regular rate and rhythm, No murmur, rubs  Lungs- clear breathing sound bilateral    Abdomen- soft, Non tender, bowel sounds present  Extremities- No cyanosis, clubbing, edema  Neuro- No focal neurological deficit           PAST MEDICAL HISTORY:   has a past medical history of Anxiety, Arthritis, Blood clot of neck vein, H/O pulmonary fibrosis, High cholesterol, Kidney disease, Lung disease, Pneumonitis, and Spinal stenosis  PAST SURGICAL HISTORY:   has a past surgical history that includes Lung transplant, double; Appendectomy; Tonsillectomy; and Knee arthroscopy  CURRENT MEDICATIONS:     Current Outpatient Medications   Medication Sig Dispense Refill    atovaquone (MEPRON) 750 mg/5 mL suspension Take 750 mg by mouth 2 (two) times a day   Calcium Carb-Cholecalciferol (CALCIUM 600+D) 600-800 MG-UNIT TABS Take by mouth 2 (two) times a day      cholestyramine (QUESTRAN) 4 g packet MIX AND DRINK 1 PACKET(4 GRAMS) BY MOUTH DAILY (Patient not taking: Reported on 12/4/2019) 90 packet 3    citalopram (CeleXA) 10 mg tablet Take 10 mg by mouth daily      Cyanocobalamin (HM SUPER VITAMIN B12 PO) Take 1 tablet by mouth daily   cycloSPORINE (RESTASIS) 0 05 % ophthalmic emulsion 1 drop 2 (two) times a day      diclofenac sodium (VOLTAREN) 1 % Apply 2 g topically 4 (four) times a day (Patient not taking: Reported on 12/18/2019) 100 g 3    diphenoxylate-atropine (LOMOTIL) 2 5-0 025 mg per tablet Take 1 tablet by mouth 4 (four) times a day as needed for diarrhea (Patient not taking: Reported on 12/18/2019) 30 tablet 0    docusate sodium (COLACE) 100 mg capsule Take 100 mg by mouth as needed for constipation          gabapentin (NEURONTIN) 600 MG tablet Take 300 mg by mouth 2 (two) times a day        lidocaine (XYLOCAINE) 5 % ointment Apply topically 3 (three) times a day as needed for mild pain (Patient not taking: Reported on 12/18/2019) 35 44 g 1    Magnesium Cl-Calcium Carbonate (SLOW-MAG PO) Take 128 mg by mouth daily       metoprolol tartrate (LOPRESSOR) 50 mg tablet Take 25 mg by mouth daily        Multiple Vitamins-Minerals (MULTIPLE VITAMINS/WOMENS PO) Take by mouth daily   mycophenolate (CELLCEPT) 250 mg capsule Take 1,000 mg by mouth 2 (two) times a day        mycophenolate (MYFORTIC) 360 MG TBEC Take 720 mg by mouth 2 (two) times a day      pantoprazole (PROTONIX) 40 mg tablet Take 40 mg by mouth daily   pravastatin (PRAVACHOL) 40 mg tablet Take 40 mg by mouth daily   predniSONE 5 mg tablet Take 5 mg by mouth daily   risedronate (ACTONEL) 5 MG tablet Take 5 mg by mouth every morning before breakfast with water on empty stomach, nothing by mouth or lie down for next 30 minutes   Tacrolimus (PROGRAF PO) Take 2 mg by mouth 2 (two) times a day       traMADol (ULTRAM) 50 mg tablet Take 50 mg by mouth daily        Vitamin D, Ergocalciferol, 2000 units CAPS 2 (two) times a day      zolpidem (AMBIEN) 5 mg tablet Take 1 tablet (5 mg total) by mouth daily at bedtime as needed for sleep 30 tablet 3     No current facility-administered medications for this visit  [unfilled]    SOCIAL HISTORY:   reports that she has never smoked  She has never used smokeless tobacco  She reports that she does not drink alcohol or use drugs  FAMILY HISTORY:  family history includes COPD in her father; Hypertension in her father and mother; Lung cancer in her father; Lupus in her other and sister; Skin cancer in her mother  ALLERGIES:  is allergic to zofran [ondansetron] and bactrim [sulfamethoxazole-trimethoprim]      REVIEW OF SYSTEMS:  Please note that a 14-point review of systems was performed to include Constitutional, HEENT, Respiratory, CVS, GI, , Musculoskeletal, Integumentary, Neurologic, Rheumatologic, Endocrinologic, Psychiatric, Lymphatic, and Hematologic/Oncologic systems were reviewed and are negative unless otherwise stated in HPI  Positive and negative findings pertinent to this evaluation are incorporated into the history of present illness  LAB:    Lab Results   Component Value Date    WBC 9 69 2019    HGB 10 1 (L) 2019    HCT 33 2 (L) 2019     (H) 2019     2019       Lab Results   Component Value Date     (L) 2016    K 6 2 (H) 2016     (H) 2016    CO2 18 (L) 2016    ANIONGAP 5 2015    BUN 42 (H) 2016    CREATININE 1 57 (H) 2016    GLUCOSE 115 2015    CALCIUM 8 3 2016    AST 27 2016    ALT 33 2016    ALKPHOS 93 2016    PROT 5 8 (L) 2016    BILITOT 0 2 2016    EGFR 32 8 2016       IMAGING:    No orders to display     No results found  HEMATOLOGY / ONCOLOGY CLINIC NOTE    Primary Care Provider: Elver Segovia DO  Referring Provider:    MRN: [id number]  : 1950    Reason for Encounter:    Chief Complaint   Patient presents with    Follow-up     6 month          Hematology / Oncology History:     Ashley Ahumada is a 68 y o  female who came in for follow up  Anemia : Normocytic; multifactorial, likely drug (tacrolimus) related , and CKD  repeat work up showed no blood loss or hemolysis  Patient had a bone marrow biopsy in [facility] showed no major abnormalities per patient        Hemoglobin:  6 9  (),  7 2   (),  7 4 (2018),   8 1 ()  ,  7 5 (10/8/2018)    - 2018:  received 4  iv iron Venofer 300 mg iv weekly  ;   Aranesp 100 mcg weekly x 4   - :  Received 1 unit of PRBC transfusion  - 10/10 : plan to give feraheme 510 mg iv weekly x 2 , Aranesp 100 mcg weekly x 4      - 2019:  Plan to start Feraheme 500 mg IV every 4 weeks, Aranesp 100 mics subcu injection every 4 weeks  - current treatment:  As above      Interval History:     3/20 :  Patient came in for follow-up  Reported no bleeding  Strength stable  Jose dyspnea while walking a long distance  Has been reported patient still appears pale  Patient stopped following hematology in [facility]  7/16:  Came in for follow-up  Reported doing well, no bleeding  Strengths is weak however stable  10/10 : Came in for follow-up  Reported feeling fatigued  No bleeding  No other complaints  4/10/2019:  Patient reported overall has been doing well  She feels the energy has been increasing  No bleeding  Reported her tremor has been getting worse, she scheduled to see a neurologist     Problem list:       Patient Active Problem List   Diagnosis    Lung transplant status, bilateral ([address] )    ROSEANNE [facility]acute [facility]) [facility] [address] [facility]    Neutropenia, drug-induced ([address] )    Anemia    Right shoulder pain    Spinal stenosis    Stage 3 chronic kidney disease ([address] )    Tremor    Acute embolism and thrombosis of deep vein of both distal lower extremities (HCC)    Chronic kidney disease, stage IV (severe) ([address] )       Assessment / Plan:       1  Anemia in chronic kidney disease, unspecified CKD stage   -  Macrocytic; multifactorial, likely drug (tacrolimus) - related , and CKD  repeat work up in 10/2017 showed no blood loss or hemolysis  Iron panel is normal, no bleeding  Suggesting the anemia is likely due to bone marrow suppression by transplant/tacrolimus      -         patient appears responded to last Feraheme and Aranesp  Her hemoglobin has increased to 11 5 in 11/2018, not gradually decreased to 8 5  We decided to start regular monthly based Feraheme and Aranesp  Will follow patient in 3 months with pre visit lab  Go over hemoglobin is 10 to 11             Plan  -     Give Feraheme and Aranesp every 4 weeks, follow-up in 3 months         2, neutropenia is stable    3, high risk for iron def  - high risk for iron def due to on Procrit  Need iv iron support  25    minutes were spent on this visit  All questions answered to satisfaction; Advised pt to call if there is any further questions  PHYSICIAL EXAMINATION:       Vital Signs:   [unfilled]  Body mass index is 26 43 kg/m²  Body surface area is 1 75 meters squared  No change from last visit  GEN: Alert, awake oriented x3, in no acute distress;     Wearing mask, appears pale  HEENT- No pallor, icterus, cyanosis, no oral mucosal lesions,   LAD - no palpable cervical, clavicle, axillary, inguinal LAD  Heart- normal S1 S2, regular rate and rhythm, No murmur, rubs  Lungs- clear breathing sound bilateral    Abdomen- soft, Non tender, bowel sounds present  Extremities- No cyanosis, clubbing, edema  Neuro- No focal neurological deficit           PAST MEDICAL HISTORY:   has a past medical history of Anxiety, Arthritis, Blood clot of neck vein, H/O pulmonary fibrosis, High cholesterol, Kidney disease, Lung disease, Pneumonitis, and Spinal stenosis  PAST SURGICAL HISTORY:   has a past surgical history that includes Lung transplant, double; Appendectomy; Tonsillectomy; and Knee arthroscopy  CURRENT MEDICATIONS:     Current Outpatient Medications   Medication Sig Dispense Refill    atovaquone (MEPRON) 750 mg/5 mL suspension Take 750 mg by mouth 2 (two) times a day   Calcium Carb-Cholecalciferol (CALCIUM 600+D) 600-800 MG-UNIT TABS Take by mouth 2 (two) times a day      cholestyramine (QUESTRAN) 4 g packet MIX AND DRINK 1 PACKET(4 GRAMS) BY MOUTH DAILY (Patient not taking: Reported on 12/4/2019) 90 packet 3    citalopram (CeleXA) 10 mg tablet Take 10 mg by mouth daily      Cyanocobalamin (HM SUPER VITAMIN B12 PO) Take 1 tablet by mouth daily          cycloSPORINE (RESTASIS) 0 05 % ophthalmic emulsion 1 drop 2 (two) times a day      diclofenac sodium (VOLTAREN) 1 % Apply 2 g topically 4 (four) times a day (Patient not taking: Reported on 12/18/2019) 100 g 3    diphenoxylate-atropine (LOMOTIL) 2 5-0 025 mg per tablet Take 1 tablet by mouth 4 (four) times a day as needed for diarrhea (Patient not taking: Reported on 12/18/2019) 30 tablet 0    docusate sodium (COLACE) 100 mg capsule Take 100 mg by mouth as needed for constipation   gabapentin (NEURONTIN) 600 MG tablet Take 300 mg by mouth 2 (two) times a day        lidocaine (XYLOCAINE) 5 % ointment Apply topically 3 (three) times a day as needed for mild pain (Patient not taking: Reported on 12/18/2019) 35 44 g 1    Magnesium Cl-Calcium Carbonate (SLOW-MAG PO) Take 128 mg by mouth daily       metoprolol tartrate (LOPRESSOR) 50 mg tablet Take 25 mg by mouth daily        Multiple Vitamins-Minerals (MULTIPLE VITAMINS/WOMENS PO) Take by mouth daily   mycophenolate (CELLCEPT) 250 mg capsule Take 1,000 mg by mouth 2 (two) times a day        mycophenolate (MYFORTIC) 360 MG TBEC Take 720 mg by mouth 2 (two) times a day      pantoprazole (PROTONIX) 40 mg tablet Take 40 mg by mouth daily   pravastatin (PRAVACHOL) 40 mg tablet Take 40 mg by mouth daily   predniSONE 5 mg tablet Take 5 mg by mouth daily   risedronate (ACTONEL) 5 MG tablet Take 5 mg by mouth every morning before breakfast with water on empty stomach, nothing by mouth or lie down for next 30 minutes   Tacrolimus (PROGRAF PO) Take 2 mg by mouth 2 (two) times a day       traMADol (ULTRAM) 50 mg tablet Take 50 mg by mouth daily        Vitamin D, Ergocalciferol, 2000 units CAPS 2 (two) times a day      zolpidem (AMBIEN) 5 mg tablet Take 1 tablet (5 mg total) by mouth daily at bedtime as needed for sleep 30 tablet 3     No current facility-administered medications for this visit  [unfilled]    SOCIAL HISTORY:   reports that she has never smoked  She has never used smokeless tobacco  She reports that she does not drink alcohol or use drugs       FAMILY HISTORY:  family history includes COPD in her father; Hypertension in her father and mother; Lung cancer in her father; Lupus in her other and sister; Skin cancer in her mother  ALLERGIES:  is allergic to zofran [ondansetron] and bactrim [sulfamethoxazole-trimethoprim]  REVIEW OF SYSTEMS:  Please note that a 14-point review of systems was performed to include Constitutional, HEENT, Respiratory, CVS, GI, , Musculoskeletal, Integumentary, Neurologic, Rheumatologic, Endocrinologic, Psychiatric, Lymphatic, and Hematologic/Oncologic systems were reviewed and are negative unless otherwise stated in HPI  Positive and negative findings pertinent to this evaluation are incorporated into the history of present illness  LAB:    Lab Results   Component Value Date    WBC 9 69 09/25/2019    HGB 10 1 (L) 09/25/2019    HCT 33 2 (L) 09/25/2019     (H) 09/25/2019     09/25/2019       Lab Results   Component Value Date     (L) 06/16/2016    K 6 2 (H) 06/24/2016     (H) 06/24/2016    CO2 18 (L) 06/24/2016    ANIONGAP 5 02/16/2015    BUN 42 (H) 06/24/2016    CREATININE 1 57 (H) 06/24/2016    GLUCOSE 115 02/16/2015    CALCIUM 8 3 06/24/2016    AST 27 06/24/2016    ALT 33 06/24/2016    ALKPHOS 93 06/24/2016    PROT 5 8 (L) 06/16/2016    BILITOT 0 2 06/16/2016    EGFR 32 8 06/24/2016       IMAGING:    No orders to display     No results found

## 2020-02-05 ENCOUNTER — TELEPHONE (OUTPATIENT)
Dept: HEMATOLOGY ONCOLOGY | Facility: CLINIC | Age: 70
End: 2020-02-05

## 2020-02-05 RX ORDER — SODIUM CHLORIDE 9 MG/ML
20 INJECTION, SOLUTION INTRAVENOUS ONCE
Status: CANCELLED | OUTPATIENT
Start: 2020-02-11

## 2020-02-11 ENCOUNTER — HOSPITAL ENCOUNTER (OUTPATIENT)
Dept: INFUSION CENTER | Facility: CLINIC | Age: 70
Discharge: HOME | End: 2020-02-11
Payer: MEDICARE

## 2020-02-11 VITALS
SYSTOLIC BLOOD PRESSURE: 132 MMHG | RESPIRATION RATE: 20 BRPM | TEMPERATURE: 98.4 F | DIASTOLIC BLOOD PRESSURE: 68 MMHG | OXYGEN SATURATION: 98 % | HEART RATE: 78 BPM

## 2020-02-11 DIAGNOSIS — N18.30 STAGE 3 CHRONIC KIDNEY DISEASE (HCC): Primary | ICD-10-CM

## 2020-02-11 DIAGNOSIS — N18.9 ANEMIA DUE TO CHRONIC KIDNEY DISEASE, UNSPECIFIED CKD STAGE: ICD-10-CM

## 2020-02-11 DIAGNOSIS — D63.1 ANEMIA DUE TO CHRONIC KIDNEY DISEASE, UNSPECIFIED CKD STAGE: ICD-10-CM

## 2020-02-11 PROCEDURE — 96365 THER/PROPH/DIAG IV INF INIT: CPT

## 2020-02-11 RX ORDER — SODIUM CHLORIDE 9 MG/ML
20 INJECTION, SOLUTION INTRAVENOUS ONCE
Status: CANCELLED | OUTPATIENT
Start: 2020-03-10

## 2020-02-11 RX ORDER — SODIUM CHLORIDE 9 MG/ML
20 INJECTION, SOLUTION INTRAVENOUS ONCE
Status: COMPLETED | OUTPATIENT
Start: 2020-02-11 | End: 2020-02-11

## 2020-02-11 RX ADMIN — FERUMOXYTOL 510 MG: 510 INJECTION INTRAVENOUS at 15:19

## 2020-02-11 RX ADMIN — SODIUM CHLORIDE 20 ML/HR: 0.9 INJECTION, SOLUTION INTRAVENOUS at 14:55

## 2020-02-11 NOTE — PROGRESS NOTES
Pt completed infusion without incident      pt has AVS and return appt 2/25/2020  Pt dcd stable and ambulatory

## 2020-02-11 NOTE — PROGRESS NOTES
Pt arrived to unit with no c/o she is wearing a mask she states for her protection as she is a double lung transplant recipient    PIV initiatedand pt has call bell within recah      orders for [facility] reviewed and pt is agreeable to proceed

## 2020-02-25 ENCOUNTER — HOSPITAL ENCOUNTER (OUTPATIENT)
Dept: INFUSION CENTER | Facility: CLINIC | Age: 70
Discharge: HOME | End: 2020-02-25
Payer: MEDICARE

## 2020-02-25 DIAGNOSIS — N18.9 ANEMIA DUE TO CHRONIC KIDNEY DISEASE, UNSPECIFIED CKD STAGE: Primary | ICD-10-CM

## 2020-02-25 DIAGNOSIS — D63.1 ANEMIA DUE TO CHRONIC KIDNEY DISEASE, UNSPECIFIED CKD STAGE: Primary | ICD-10-CM

## 2020-02-25 PROCEDURE — 96372 THER/PROPH/DIAG INJ SC/IM: CPT

## 2020-02-25 RX ADMIN — DARBEPOETIN ALFA 200 MCG: 200 INJECTION, SOLUTION INTRAVENOUS; SUBCUTANEOUS at 15:29

## 2020-02-25 NOTE — PROGRESS NOTES
Pt here for monthly aranesp injection, offers no complaints  HGB 10 2 on 2/24  Aranesp given in R arm without any issues  Pt aware of next appointments   Declines AVS

## 2020-03-10 ENCOUNTER — HOSPITAL ENCOUNTER (OUTPATIENT)
Dept: INFUSION CENTER | Facility: CLINIC | Age: 70
Discharge: HOME | End: 2020-03-10
Payer: MEDICARE

## 2020-03-10 VITALS
HEART RATE: 101 BPM | RESPIRATION RATE: 18 BRPM | SYSTOLIC BLOOD PRESSURE: 133 MMHG | DIASTOLIC BLOOD PRESSURE: 61 MMHG | TEMPERATURE: 98.6 F | OXYGEN SATURATION: 91 %

## 2020-03-10 DIAGNOSIS — N18.30 STAGE 3 CHRONIC KIDNEY DISEASE (HCC): Primary | ICD-10-CM

## 2020-03-10 DIAGNOSIS — D63.1 ANEMIA DUE TO CHRONIC KIDNEY DISEASE, UNSPECIFIED CKD STAGE: ICD-10-CM

## 2020-03-10 DIAGNOSIS — N18.9 ANEMIA DUE TO CHRONIC KIDNEY DISEASE, UNSPECIFIED CKD STAGE: ICD-10-CM

## 2020-03-10 PROCEDURE — 96365 THER/PROPH/DIAG IV INF INIT: CPT

## 2020-03-10 RX ORDER — SODIUM CHLORIDE 9 MG/ML
20 INJECTION, SOLUTION INTRAVENOUS ONCE
Status: CANCELLED | OUTPATIENT
Start: 2020-04-07

## 2020-03-10 RX ORDER — SODIUM CHLORIDE 9 MG/ML
20 INJECTION, SOLUTION INTRAVENOUS ONCE
Status: COMPLETED | OUTPATIENT
Start: 2020-03-10 | End: 2020-03-10

## 2020-03-10 RX ADMIN — FERUMOXYTOL 510 MG: 510 INJECTION INTRAVENOUS at 13:40

## 2020-03-10 RX ADMIN — SODIUM CHLORIDE 20 ML/HR: 0.9 INJECTION, SOLUTION INTRAVENOUS at 13:05

## 2020-03-10 NOTE — PROGRESS NOTES
Patient to Hugo Bennett: Offers no complaints at present time: Lab work ( 02/24/20 ) reviewed: No parameters written: Left AC PIV initiated without incident

## 2020-03-24 ENCOUNTER — HOSPITAL ENCOUNTER (OUTPATIENT)
Dept: INFUSION CENTER | Facility: CLINIC | Age: 70
Discharge: HOME | End: 2020-03-24

## 2020-03-24 DIAGNOSIS — D63.1 ANEMIA DUE TO CHRONIC KIDNEY DISEASE, UNSPECIFIED CKD STAGE: Primary | ICD-10-CM

## 2020-03-24 DIAGNOSIS — N18.9 ANEMIA DUE TO CHRONIC KIDNEY DISEASE, UNSPECIFIED CKD STAGE: Primary | ICD-10-CM

## 2020-04-07 ENCOUNTER — HOSPITAL ENCOUNTER (OUTPATIENT)
Dept: INFUSION CENTER | Facility: CLINIC | Age: 70
Discharge: HOME | End: 2020-04-07
Payer: MEDICARE

## 2020-04-07 VITALS
SYSTOLIC BLOOD PRESSURE: 148 MMHG | DIASTOLIC BLOOD PRESSURE: 72 MMHG | TEMPERATURE: 99.7 F | RESPIRATION RATE: 18 BRPM | OXYGEN SATURATION: 92 % | HEART RATE: 92 BPM

## 2020-04-07 DIAGNOSIS — N18.30 STAGE 3 CHRONIC KIDNEY DISEASE (HCC): Primary | ICD-10-CM

## 2020-04-07 DIAGNOSIS — D63.1 ANEMIA DUE TO CHRONIC KIDNEY DISEASE, UNSPECIFIED CKD STAGE: ICD-10-CM

## 2020-04-07 DIAGNOSIS — N18.9 ANEMIA DUE TO CHRONIC KIDNEY DISEASE, UNSPECIFIED CKD STAGE: ICD-10-CM

## 2020-04-07 PROCEDURE — 96365 THER/PROPH/DIAG IV INF INIT: CPT

## 2020-04-07 RX ORDER — SODIUM CHLORIDE 9 MG/ML
20 INJECTION, SOLUTION INTRAVENOUS ONCE
Status: COMPLETED | OUTPATIENT
Start: 2020-04-07 | End: 2020-04-07

## 2020-04-07 RX ORDER — SODIUM CHLORIDE 9 MG/ML
20 INJECTION, SOLUTION INTRAVENOUS ONCE
Status: CANCELLED | OUTPATIENT
Start: 2020-05-05

## 2020-04-07 RX ADMIN — FERUMOXYTOL 510 MG: 510 INJECTION INTRAVENOUS at 14:35

## 2020-04-07 RX ADMIN — SODIUM CHLORIDE 20 ML/HR: 0.9 INJECTION, SOLUTION INTRAVENOUS at 14:10

## 2020-04-22 ENCOUNTER — HOSPITAL ENCOUNTER (OUTPATIENT)
Dept: INFUSION CENTER | Facility: CLINIC | Age: 70
Discharge: HOME | End: 2020-04-22
Payer: MEDICARE

## 2020-04-22 VITALS — DIASTOLIC BLOOD PRESSURE: 62 MMHG | TEMPERATURE: 99.1 F | SYSTOLIC BLOOD PRESSURE: 118 MMHG

## 2020-04-22 DIAGNOSIS — N18.9 ANEMIA DUE TO CHRONIC KIDNEY DISEASE, UNSPECIFIED CKD STAGE: Primary | ICD-10-CM

## 2020-04-22 DIAGNOSIS — D63.1 ANEMIA DUE TO CHRONIC KIDNEY DISEASE, UNSPECIFIED CKD STAGE: Primary | ICD-10-CM

## 2020-04-22 PROCEDURE — 96372 THER/PROPH/DIAG INJ SC/IM: CPT

## 2020-04-22 RX ORDER — GABAPENTIN 300 MG/1
300 CAPSULE ORAL 2 TIMES DAILY
COMMUNITY
Start: 2020-04-04

## 2020-04-22 RX ADMIN — DARBEPOETIN ALFA 200 MCG: 200 INJECTION, SOLUTION INTRAVENOUS; SUBCUTANEOUS at 12:23

## 2020-04-28 ENCOUNTER — TELEPHONE (OUTPATIENT)
Dept: HEMATOLOGY ONCOLOGY | Facility: MEDICAL CENTER | Age: 70
End: 2020-04-28

## 2020-05-05 ENCOUNTER — HOSPITAL ENCOUNTER (OUTPATIENT)
Dept: INFUSION CENTER | Facility: CLINIC | Age: 70
Discharge: HOME | End: 2020-05-05
Payer: MEDICARE

## 2020-05-05 VITALS
SYSTOLIC BLOOD PRESSURE: 119 MMHG | RESPIRATION RATE: 20 BRPM | DIASTOLIC BLOOD PRESSURE: 61 MMHG | TEMPERATURE: 99 F | HEART RATE: 77 BPM

## 2020-05-05 DIAGNOSIS — N18.30 STAGE 3 CHRONIC KIDNEY DISEASE (HCC): Primary | ICD-10-CM

## 2020-05-05 DIAGNOSIS — N18.9 ANEMIA DUE TO CHRONIC KIDNEY DISEASE, UNSPECIFIED CKD STAGE: ICD-10-CM

## 2020-05-05 DIAGNOSIS — D63.1 ANEMIA DUE TO CHRONIC KIDNEY DISEASE, UNSPECIFIED CKD STAGE: ICD-10-CM

## 2020-05-05 PROCEDURE — 96365 THER/PROPH/DIAG IV INF INIT: CPT

## 2020-05-05 RX ORDER — SODIUM CHLORIDE 9 MG/ML
20 INJECTION, SOLUTION INTRAVENOUS ONCE
Status: CANCELLED | OUTPATIENT
Start: 2020-06-02

## 2020-05-05 RX ORDER — SODIUM CHLORIDE 9 MG/ML
20 INJECTION, SOLUTION INTRAVENOUS ONCE
Status: COMPLETED | OUTPATIENT
Start: 2020-05-05 | End: 2020-05-05

## 2020-05-05 RX ADMIN — FERUMOXYTOL 510 MG: 510 INJECTION INTRAVENOUS at 14:23

## 2020-05-05 RX ADMIN — SODIUM CHLORIDE 20 ML/HR: 0.9 INJECTION, SOLUTION INTRAVENOUS at 14:07

## 2020-05-08 ENCOUNTER — OFFICE VISIT (OUTPATIENT)
Dept: GASTROENTEROLOGY | Facility: CLINIC | Age: 70
End: 2020-05-08
Payer: MEDICARE

## 2020-05-08 VITALS
BODY MASS INDEX: 25.27 KG/M2 | TEMPERATURE: 100.7 F | HEART RATE: 81 BPM | HEIGHT: 64 IN | SYSTOLIC BLOOD PRESSURE: 102 MMHG | WEIGHT: 148 LBS | DIASTOLIC BLOOD PRESSURE: 66 MMHG

## 2020-05-08 DIAGNOSIS — R19.7 DIARRHEA, UNSPECIFIED TYPE: Primary | ICD-10-CM

## 2020-05-08 DIAGNOSIS — R63.4 WEIGHT LOSS: ICD-10-CM

## 2020-05-08 DIAGNOSIS — Z94.2 LUNG TRANSPLANT STATUS, BILATERAL (HCC): ICD-10-CM

## 2020-05-08 PROCEDURE — 99204 OFFICE O/P NEW MOD 45 MIN: CPT | Performed by: INTERNAL MEDICINE

## 2020-05-08 RX ORDER — TACROLIMUS 0.5 MG/1
CAPSULE ORAL
COMMUNITY
Start: 2020-04-12

## 2020-05-08 RX ORDER — RISEDRONATE SODIUM 35 MG/1
35 TABLET, FILM COATED ORAL
COMMUNITY
Start: 2020-04-21

## 2020-05-08 RX ORDER — TACROLIMUS 1 MG/1
CAPSULE ORAL
COMMUNITY
Start: 2020-04-12

## 2020-05-12 ENCOUNTER — TELEPHONE (OUTPATIENT)
Dept: GASTROENTEROLOGY | Facility: CLINIC | Age: 70
End: 2020-05-12

## 2020-05-15 ENCOUNTER — APPOINTMENT (OUTPATIENT)
Dept: LAB | Facility: CLINIC | Age: 70
End: 2020-05-15
Payer: MEDICARE

## 2020-05-15 DIAGNOSIS — R19.7 DIARRHEA, UNSPECIFIED TYPE: ICD-10-CM

## 2020-05-15 PROCEDURE — 86255 FLUORESCENT ANTIBODY SCREEN: CPT

## 2020-05-15 PROCEDURE — 83516 IMMUNOASSAY NONANTIBODY: CPT

## 2020-05-15 PROCEDURE — 36415 COLL VENOUS BLD VENIPUNCTURE: CPT

## 2020-05-15 PROCEDURE — 82784 ASSAY IGA/IGD/IGG/IGM EACH: CPT

## 2020-05-16 LAB
ENDOMYSIUM IGA SER QL: NEGATIVE
GLIADIN PEPTIDE IGA SER-ACNC: 2 UNITS (ref 0–19)
GLIADIN PEPTIDE IGG SER-ACNC: 1 UNITS (ref 0–19)
IGA SERPL-MCNC: 76 MG/DL (ref 64–422)
TTG IGA SER-ACNC: <2 U/ML (ref 0–3)
TTG IGG SER-ACNC: <2 U/ML (ref 0–5)

## 2020-05-19 ENCOUNTER — TRANSCRIBE ORDERS (OUTPATIENT)
Dept: LAB | Facility: CLINIC | Age: 70
End: 2020-05-19

## 2020-05-19 ENCOUNTER — TELEPHONE (OUTPATIENT)
Dept: HEMATOLOGY ONCOLOGY | Facility: CLINIC | Age: 70
End: 2020-05-19

## 2020-05-19 ENCOUNTER — APPOINTMENT (OUTPATIENT)
Dept: LAB | Facility: CLINIC | Age: 70
End: 2020-05-19
Payer: MEDICARE

## 2020-05-19 DIAGNOSIS — R19.7 DIARRHEA, UNSPECIFIED TYPE: ICD-10-CM

## 2020-05-19 PROCEDURE — 87493 C DIFF AMPLIFIED PROBE: CPT

## 2020-05-20 ENCOUNTER — HOSPITAL ENCOUNTER (OUTPATIENT)
Dept: INFUSION CENTER | Facility: CLINIC | Age: 70
End: 2020-05-20

## 2020-05-20 LAB — C DIFF TOX GENS STL QL NAA+PROBE: NEGATIVE

## 2020-05-26 DIAGNOSIS — M79.641 PAIN IN BOTH HANDS: ICD-10-CM

## 2020-05-26 DIAGNOSIS — M79.642 PAIN IN BOTH HANDS: ICD-10-CM

## 2020-05-31 ENCOUNTER — HOSPITAL ENCOUNTER (OUTPATIENT)
Dept: MRI IMAGING | Facility: HOSPITAL | Age: 70
Discharge: HOME | End: 2020-05-31
Attending: INTERNAL MEDICINE
Payer: MEDICARE

## 2020-05-31 DIAGNOSIS — R19.7 DIARRHEA, UNSPECIFIED TYPE: ICD-10-CM

## 2020-05-31 DIAGNOSIS — R63.4 WEIGHT LOSS: ICD-10-CM

## 2020-05-31 PROCEDURE — 74181 MRI ABDOMEN W/O CONTRAST: CPT

## 2020-06-02 ENCOUNTER — HOSPITAL ENCOUNTER (OUTPATIENT)
Dept: INFUSION CENTER | Facility: CLINIC | Age: 70
Discharge: HOME | End: 2020-06-02
Payer: MEDICARE

## 2020-06-02 VITALS
TEMPERATURE: 97.4 F | HEART RATE: 97 BPM | DIASTOLIC BLOOD PRESSURE: 73 MMHG | SYSTOLIC BLOOD PRESSURE: 155 MMHG | RESPIRATION RATE: 20 BRPM

## 2020-06-02 DIAGNOSIS — D63.1 ANEMIA DUE TO CHRONIC KIDNEY DISEASE, UNSPECIFIED CKD STAGE: ICD-10-CM

## 2020-06-02 DIAGNOSIS — N18.30 STAGE 3 CHRONIC KIDNEY DISEASE (HCC): Primary | ICD-10-CM

## 2020-06-02 DIAGNOSIS — N18.9 ANEMIA DUE TO CHRONIC KIDNEY DISEASE, UNSPECIFIED CKD STAGE: ICD-10-CM

## 2020-06-02 PROCEDURE — 96365 THER/PROPH/DIAG IV INF INIT: CPT

## 2020-06-02 RX ORDER — SODIUM CHLORIDE 9 MG/ML
20 INJECTION, SOLUTION INTRAVENOUS ONCE
Status: CANCELLED | OUTPATIENT
Start: 2020-06-30

## 2020-06-02 RX ORDER — SODIUM CHLORIDE 9 MG/ML
20 INJECTION, SOLUTION INTRAVENOUS ONCE
Status: COMPLETED | OUTPATIENT
Start: 2020-06-02 | End: 2020-06-02

## 2020-06-02 RX ADMIN — FERUMOXYTOL 510 MG: 510 INJECTION INTRAVENOUS at 14:25

## 2020-06-02 RX ADMIN — SODIUM CHLORIDE 20 ML/HR: 0.9 INJECTION, SOLUTION INTRAVENOUS at 14:12

## 2020-06-05 ENCOUNTER — HOSPITAL ENCOUNTER (OUTPATIENT)
Dept: RADIOLOGY | Facility: MEDICAL CENTER | Age: 70
Discharge: HOME | End: 2020-06-05
Payer: MEDICARE

## 2020-06-05 VITALS — BODY MASS INDEX: 25.27 KG/M2 | HEIGHT: 64 IN | WEIGHT: 148 LBS

## 2020-06-05 DIAGNOSIS — Z12.39 SCREENING FOR BREAST CANCER: ICD-10-CM

## 2020-06-05 DIAGNOSIS — Z12.31 VISIT FOR SCREENING MAMMOGRAM: ICD-10-CM

## 2020-06-05 PROCEDURE — 77067 SCR MAMMO BI INCL CAD: CPT

## 2020-06-05 PROCEDURE — 77063 BREAST TOMOSYNTHESIS BI: CPT

## 2020-06-11 ENCOUNTER — OFFICE VISIT (OUTPATIENT)
Dept: OBGYN CLINIC | Facility: CLINIC | Age: 70
End: 2020-06-11
Payer: MEDICARE

## 2020-06-11 ENCOUNTER — APPOINTMENT (OUTPATIENT)
Dept: RADIOLOGY | Facility: AMBULARY SURGERY CENTER | Age: 70
End: 2020-06-11
Attending: ORTHOPAEDIC SURGERY
Payer: MEDICARE

## 2020-06-11 VITALS
HEART RATE: 101 BPM | DIASTOLIC BLOOD PRESSURE: 77 MMHG | HEIGHT: 64 IN | SYSTOLIC BLOOD PRESSURE: 149 MMHG | WEIGHT: 148 LBS | BODY MASS INDEX: 25.27 KG/M2

## 2020-06-11 DIAGNOSIS — M25.561 PAIN IN BOTH KNEES, UNSPECIFIED CHRONICITY: ICD-10-CM

## 2020-06-11 DIAGNOSIS — M17.0 BILATERAL PRIMARY OSTEOARTHRITIS OF KNEE: ICD-10-CM

## 2020-06-11 DIAGNOSIS — M22.2X1 BILATERAL PATELLOFEMORAL SYNDROME: Primary | ICD-10-CM

## 2020-06-11 DIAGNOSIS — M25.562 PAIN IN BOTH KNEES, UNSPECIFIED CHRONICITY: ICD-10-CM

## 2020-06-11 DIAGNOSIS — M22.2X2 BILATERAL PATELLOFEMORAL SYNDROME: Primary | ICD-10-CM

## 2020-06-11 PROCEDURE — 99203 OFFICE O/P NEW LOW 30 MIN: CPT | Performed by: ORTHOPAEDIC SURGERY

## 2020-06-11 PROCEDURE — 3008F BODY MASS INDEX DOCD: CPT | Performed by: ORTHOPAEDIC SURGERY

## 2020-06-11 PROCEDURE — 4040F PNEUMOC VAC/ADMIN/RCVD: CPT | Performed by: ORTHOPAEDIC SURGERY

## 2020-06-11 PROCEDURE — 1036F TOBACCO NON-USER: CPT | Performed by: ORTHOPAEDIC SURGERY

## 2020-06-11 PROCEDURE — 73562 X-RAY EXAM OF KNEE 3: CPT

## 2020-06-11 PROCEDURE — 1160F RVW MEDS BY RX/DR IN RCRD: CPT | Performed by: ORTHOPAEDIC SURGERY

## 2020-06-11 PROCEDURE — 20610 DRAIN/INJ JOINT/BURSA W/O US: CPT | Performed by: ORTHOPAEDIC SURGERY

## 2020-06-11 RX ORDER — BUPIVACAINE HYDROCHLORIDE 5 MG/ML
2 INJECTION, SOLUTION EPIDURAL; INTRACAUDAL
Status: COMPLETED | OUTPATIENT
Start: 2020-06-11 | End: 2020-06-11

## 2020-06-11 RX ORDER — LIDOCAINE HYDROCHLORIDE 10 MG/ML
1 INJECTION, SOLUTION INFILTRATION; PERINEURAL
Status: COMPLETED | OUTPATIENT
Start: 2020-06-11 | End: 2020-06-11

## 2020-06-11 RX ORDER — BETAMETHASONE SODIUM PHOSPHATE AND BETAMETHASONE ACETATE 3; 3 MG/ML; MG/ML
12 INJECTION, SUSPENSION INTRA-ARTICULAR; INTRALESIONAL; INTRAMUSCULAR; SOFT TISSUE
Status: COMPLETED | OUTPATIENT
Start: 2020-06-11 | End: 2020-06-11

## 2020-06-11 RX ORDER — METHYLPREDNISOLONE 4 MG/1
TABLET ORAL
Qty: 1 EACH | Refills: 0 | Status: SHIPPED | OUTPATIENT
Start: 2020-06-11 | End: 2020-06-19 | Stop reason: ALTCHOICE

## 2020-06-11 RX ADMIN — BUPIVACAINE HYDROCHLORIDE 2 ML: 5 INJECTION, SOLUTION EPIDURAL; INTRACAUDAL at 14:59

## 2020-06-11 RX ADMIN — BETAMETHASONE SODIUM PHOSPHATE AND BETAMETHASONE ACETATE 12 MG: 3; 3 INJECTION, SUSPENSION INTRA-ARTICULAR; INTRALESIONAL; INTRAMUSCULAR; SOFT TISSUE at 14:59

## 2020-06-11 RX ADMIN — LIDOCAINE HYDROCHLORIDE 1 ML: 10 INJECTION, SOLUTION INFILTRATION; PERINEURAL at 14:59

## 2020-06-17 ENCOUNTER — HOSPITAL ENCOUNTER (OUTPATIENT)
Dept: INFUSION CENTER | Facility: CLINIC | Age: 70
Discharge: HOME | End: 2020-06-17
Payer: MEDICARE

## 2020-06-17 VITALS — TEMPERATURE: 96.8 F

## 2020-06-17 DIAGNOSIS — D63.1 ANEMIA DUE TO CHRONIC KIDNEY DISEASE, UNSPECIFIED CKD STAGE: Primary | ICD-10-CM

## 2020-06-17 DIAGNOSIS — N18.9 ANEMIA DUE TO CHRONIC KIDNEY DISEASE, UNSPECIFIED CKD STAGE: Primary | ICD-10-CM

## 2020-06-17 PROCEDURE — 96372 THER/PROPH/DIAG INJ SC/IM: CPT

## 2020-06-17 RX ADMIN — DARBEPOETIN ALFA 200 MCG: 200 INJECTION, SOLUTION INTRAVENOUS; SUBCUTANEOUS at 14:00

## 2020-06-18 ENCOUNTER — TELEPHONE (OUTPATIENT)
Dept: HEMATOLOGY ONCOLOGY | Facility: CLINIC | Age: 70
End: 2020-06-18

## 2020-06-19 ENCOUNTER — OFFICE VISIT (OUTPATIENT)
Dept: FAMILY MEDICINE CLINIC | Facility: CLINIC | Age: 70
End: 2020-06-19
Payer: MEDICARE

## 2020-06-19 VITALS
DIASTOLIC BLOOD PRESSURE: 82 MMHG | SYSTOLIC BLOOD PRESSURE: 140 MMHG | HEIGHT: 64 IN | WEIGHT: 147 LBS | HEART RATE: 90 BPM | TEMPERATURE: 98.5 F | BODY MASS INDEX: 25.1 KG/M2

## 2020-06-19 DIAGNOSIS — L89.301 PRESSURE INJURY OF BUTTOCK, STAGE 1, UNSPECIFIED LATERALITY: Primary | ICD-10-CM

## 2020-06-19 PROCEDURE — 1160F RVW MEDS BY RX/DR IN RCRD: CPT | Performed by: FAMILY MEDICINE

## 2020-06-19 PROCEDURE — 4040F PNEUMOC VAC/ADMIN/RCVD: CPT | Performed by: FAMILY MEDICINE

## 2020-06-19 PROCEDURE — 99213 OFFICE O/P EST LOW 20 MIN: CPT | Performed by: FAMILY MEDICINE

## 2020-06-19 PROCEDURE — 1036F TOBACCO NON-USER: CPT | Performed by: FAMILY MEDICINE

## 2020-06-20 DIAGNOSIS — Z20.822 ENCOUNTER FOR LABORATORY TESTING FOR COVID-19 VIRUS: ICD-10-CM

## 2020-06-20 PROCEDURE — U0003 INFECTIOUS AGENT DETECTION BY NUCLEIC ACID (DNA OR RNA); SEVERE ACUTE RESPIRATORY SYNDROME CORONAVIRUS 2 (SARS-COV-2) (CORONAVIRUS DISEASE [COVID-19]), AMPLIFIED PROBE TECHNIQUE, MAKING USE OF HIGH THROUGHPUT TECHNOLOGIES AS DESCRIBED BY CMS-2020-01-R: HCPCS

## 2020-06-21 LAB — SARS-COV-2 RNA SPEC QL NAA+PROBE: NOT DETECTED

## 2020-06-23 ENCOUNTER — OFFICE VISIT (OUTPATIENT)
Dept: HEMATOLOGY ONCOLOGY | Facility: CLINIC | Age: 70
End: 2020-06-23
Payer: MEDICARE

## 2020-06-23 VITALS
RESPIRATION RATE: 20 BRPM | BODY MASS INDEX: 25.27 KG/M2 | SYSTOLIC BLOOD PRESSURE: 154 MMHG | TEMPERATURE: 98.4 F | WEIGHT: 148 LBS | HEIGHT: 64 IN | DIASTOLIC BLOOD PRESSURE: 72 MMHG

## 2020-06-23 DIAGNOSIS — D63.1 ANEMIA DUE TO CHRONIC KIDNEY DISEASE, UNSPECIFIED CKD STAGE: Primary | ICD-10-CM

## 2020-06-23 DIAGNOSIS — N18.9 ANEMIA DUE TO CHRONIC KIDNEY DISEASE, UNSPECIFIED CKD STAGE: Primary | ICD-10-CM

## 2020-06-23 PROCEDURE — 1036F TOBACCO NON-USER: CPT | Performed by: INTERNAL MEDICINE

## 2020-06-23 PROCEDURE — 3008F BODY MASS INDEX DOCD: CPT | Performed by: INTERNAL MEDICINE

## 2020-06-23 PROCEDURE — 4040F PNEUMOC VAC/ADMIN/RCVD: CPT | Performed by: INTERNAL MEDICINE

## 2020-06-23 PROCEDURE — 1160F RVW MEDS BY RX/DR IN RCRD: CPT | Performed by: INTERNAL MEDICINE

## 2020-06-23 PROCEDURE — 99214 OFFICE O/P EST MOD 30 MIN: CPT | Performed by: INTERNAL MEDICINE

## 2020-06-25 ENCOUNTER — ANESTHESIA EVENT (OUTPATIENT)
Dept: GASTROENTEROLOGY | Facility: HOSPITAL | Age: 70
End: 2020-06-25

## 2020-06-25 ENCOUNTER — ANESTHESIA (OUTPATIENT)
Dept: GASTROENTEROLOGY | Facility: HOSPITAL | Age: 70
End: 2020-06-25

## 2020-06-25 ENCOUNTER — HOSPITAL ENCOUNTER (OUTPATIENT)
Dept: GASTROENTEROLOGY | Facility: HOSPITAL | Age: 70
Setting detail: OUTPATIENT SURGERY
Discharge: HOME | End: 2020-06-25
Attending: INTERNAL MEDICINE | Admitting: INTERNAL MEDICINE
Payer: MEDICARE

## 2020-06-25 VITALS
TEMPERATURE: 99 F | RESPIRATION RATE: 18 BRPM | SYSTOLIC BLOOD PRESSURE: 123 MMHG | HEART RATE: 79 BPM | DIASTOLIC BLOOD PRESSURE: 61 MMHG | HEIGHT: 66 IN | BODY MASS INDEX: 23.3 KG/M2 | OXYGEN SATURATION: 100 % | WEIGHT: 145 LBS

## 2020-06-25 DIAGNOSIS — N18.9 ANEMIA DUE TO CHRONIC KIDNEY DISEASE, UNSPECIFIED CKD STAGE: ICD-10-CM

## 2020-06-25 DIAGNOSIS — R63.4 WEIGHT LOSS: ICD-10-CM

## 2020-06-25 DIAGNOSIS — D63.1 ANEMIA DUE TO CHRONIC KIDNEY DISEASE, UNSPECIFIED CKD STAGE: ICD-10-CM

## 2020-06-25 PROCEDURE — 45380 COLONOSCOPY AND BIOPSY: CPT | Performed by: INTERNAL MEDICINE

## 2020-06-25 PROCEDURE — 88305 TISSUE EXAM BY PATHOLOGIST: CPT | Performed by: PATHOLOGY

## 2020-06-25 PROCEDURE — NC001 PR NO CHARGE: Performed by: INTERNAL MEDICINE

## 2020-06-25 PROCEDURE — 43239 EGD BIOPSY SINGLE/MULTIPLE: CPT | Performed by: INTERNAL MEDICINE

## 2020-06-25 RX ORDER — PROPOFOL 10 MG/ML
INJECTION, EMULSION INTRAVENOUS CONTINUOUS PRN
Status: DISCONTINUED | OUTPATIENT
Start: 2020-06-25 | End: 2020-06-25 | Stop reason: SURG

## 2020-06-25 RX ORDER — SODIUM CHLORIDE 9 MG/ML
125 INJECTION, SOLUTION INTRAVENOUS CONTINUOUS
Status: CANCELLED | OUTPATIENT
Start: 2020-06-25

## 2020-06-25 RX ORDER — SODIUM CHLORIDE 9 MG/ML
INJECTION, SOLUTION INTRAVENOUS CONTINUOUS PRN
Status: DISCONTINUED | OUTPATIENT
Start: 2020-06-25 | End: 2020-06-25 | Stop reason: SURG

## 2020-06-25 RX ORDER — SODIUM CHLORIDE 9 MG/ML
20 INJECTION, SOLUTION INTRAVENOUS CONTINUOUS
Status: CANCELLED | OUTPATIENT
Start: 2020-06-25

## 2020-06-25 RX ORDER — PROPOFOL 10 MG/ML
INJECTION, EMULSION INTRAVENOUS AS NEEDED
Status: DISCONTINUED | OUTPATIENT
Start: 2020-06-25 | End: 2020-06-25 | Stop reason: SURG

## 2020-06-25 RX ORDER — LIDOCAINE HYDROCHLORIDE 10 MG/ML
INJECTION, SOLUTION EPIDURAL; INFILTRATION; INTRACAUDAL; PERINEURAL AS NEEDED
Status: DISCONTINUED | OUTPATIENT
Start: 2020-06-25 | End: 2020-06-25 | Stop reason: SURG

## 2020-06-25 RX ORDER — GLYCOPYRROLATE 0.2 MG/ML
INJECTION INTRAMUSCULAR; INTRAVENOUS AS NEEDED
Status: DISCONTINUED | OUTPATIENT
Start: 2020-06-25 | End: 2020-06-25 | Stop reason: SURG

## 2020-06-25 RX ADMIN — SODIUM CHLORIDE: 0.9 INJECTION, SOLUTION INTRAVENOUS at 10:23

## 2020-06-25 RX ADMIN — PROPOFOL 100 MG: 10 INJECTION, EMULSION INTRAVENOUS at 10:23

## 2020-06-25 RX ADMIN — LIDOCAINE HYDROCHLORIDE 50 MG: 10 INJECTION, SOLUTION EPIDURAL; INFILTRATION; INTRACAUDAL; PERINEURAL at 10:23

## 2020-06-25 RX ADMIN — GLYCOPYRROLATE 0.1 MG: 0.2 INJECTION, SOLUTION INTRAMUSCULAR; INTRAVENOUS at 10:23

## 2020-06-25 RX ADMIN — PROPOFOL 100 MCG/KG/MIN: 10 INJECTION, EMULSION INTRAVENOUS at 10:31

## 2020-06-25 RX ADMIN — PHENYLEPHRINE HYDROCHLORIDE 200 MCG: 10 INJECTION INTRAVENOUS at 10:41

## 2020-07-01 ENCOUNTER — EVALUATION (OUTPATIENT)
Dept: PHYSICAL THERAPY | Facility: CLINIC | Age: 70
End: 2020-07-01
Payer: MEDICARE

## 2020-07-01 ENCOUNTER — TELEPHONE (OUTPATIENT)
Dept: GASTROENTEROLOGY | Facility: CLINIC | Age: 70
End: 2020-07-01

## 2020-07-01 DIAGNOSIS — M22.2X2 BILATERAL PATELLOFEMORAL SYNDROME: ICD-10-CM

## 2020-07-01 DIAGNOSIS — M22.2X1 BILATERAL PATELLOFEMORAL SYNDROME: ICD-10-CM

## 2020-07-01 DIAGNOSIS — M17.0 BILATERAL PRIMARY OSTEOARTHRITIS OF KNEE: Primary | ICD-10-CM

## 2020-07-01 PROCEDURE — 97110 THERAPEUTIC EXERCISES: CPT | Performed by: PHYSICAL THERAPIST

## 2020-07-01 PROCEDURE — 97162 PT EVAL MOD COMPLEX 30 MIN: CPT | Performed by: PHYSICAL THERAPIST

## 2020-07-01 PROCEDURE — 97140 MANUAL THERAPY 1/> REGIONS: CPT | Performed by: PHYSICAL THERAPIST

## 2020-07-01 NOTE — TELEPHONE ENCOUNTER
----- Message from Madeline Montes sent at 7/1/2020  8:54 AM EDT -----      ----- Message -----  From: Renato Rivera DO  Sent: 7/1/2020   8:02 AM EDT  To: , #    Please let patient know that colon biopsies were normal, no repeat colonoscopy advised as she would be 80 in 10 years and it's not needed then    Is her diarrhea improved

## 2020-07-01 NOTE — PROGRESS NOTES
PT Evaluation     Today's date: 2020  Patient name: Mariela Hemphill  :   MRN: [id number]  Referring provider: Vinicius Narayanan  Dx:   Encounter Diagnosis     ICD-10-CM    1  Bilateral primary osteoarthritis of knee M17 0 Ambulatory referral to Physical Therapy   2  Bilateral patellofemoral syndrome M22 2X1 Ambulatory referral to Physical Therapy    M22 2X2                   Assessment  Assessment details: Pt presents with signs and symptoms synonymous of admitting diagnosis as well as is a risk for falls given subjective and objective findings  Pt presents with pain, decreased strength, decreased range, flexibility, as well as tolerance to activity and as mentioned a risk for falls  Pt would benefit skilled PT intervention in order to address these impairments in order to be able to perform all desired activities with minimal to nil symptom exacerbation   Colletta Morgans educated and performed taping procedure    Thank you very much for this referral      Impairments: abnormal gait, abnormal or restricted ROM, activity intolerance, impaired balance, impaired physical strength, lacks appropriate home exercise program, pain with function and poor posture   Understanding of Dx/Px/POC: good   Prognosis: good    Goals  STG 4 Weeks:  Decrease pain at worst to 4  Improve range to 130  Improve strength to Hip to 4-/5  Independent with HEP  LTG 8 Weeks:  Decrease pain at worst to 2/10  Improve strength to 4/5  Able to perform all desired activities with minimal to nil symptom exacerbation      Plan  Patient would benefit from: skilled physical therapy  Planned modality interventions: cryotherapy, TENS and thermotherapy: hydrocollator packs  Planned therapy interventions: joint mobilization, manual therapy, abdominal trunk stabilization, balance, neuromuscular re-education, patient education, postural training, strengthening, stretching, therapeutic activities, therapeutic exercise, transfer training, home exercise program, graded motor, graded exercise, graded activity, gait training, functional ROM exercises and flexibility  Frequency: 2x week  Duration in weeks: 10  Treatment plan discussed with: patient        Subjective Evaluation    History of Present Illness  Date of onset: 2020  Mechanism of injury: Pt reports today with her  Shayy Rahman and she is 66 yo  Pt reports a history of B/L Lung Transplant and she had fallen and required ORIF of her L femur in 2017  Pt reports using a rollator for the last year due to her spinal stenosis and presents today with insidious onset of R and then L knee pain about 2-3 months with insidious onset and state that she decided to meet with an orthopedic and met with Dr Ihsan Bliss on 2020 who had X-rays indicating OA of both knee and patellas  Pt reports that she received injections and it as a whole has seemed to help  She was also referred to trial PT intervention and Robyn taping  Pt reports taking medication assists with her knee pain  Pt reports when she is relaxing with her knees stretched out she is without pain, but at worst is a 5/10 with transferring in particular a chair  Pt reports living with her  in a ranch home with 2 CRISTHIAN through the garage  There is a lift chair if she is to go to the basement but otherwise does not need to go there  Pt reports that her goals are to be able to improve her transferring ability, and do more activities her house cleaning and cooking  Pt reports her life goals are to not have any more surgeries  Pt does not have a formal follow up with Dr Ihsan Bliss     She has B/L neuropathy in her feet, and no change in bowel or bladder  Pt's home has grab bar, shower seat and out side of the entrance is ADA accessible     Quality of life: good    Pain  Current pain ratin  At best pain ratin  At worst pain ratin  Quality: dull ache and sharp  Relieving factors: change in position and medications  Aggravating factors: standing, walking and stair climbing  Progression: improved      Diagnostic Tests  X-ray: normal  Treatments  Previous treatment: physical therapy  Patient Goals  Patient goals for therapy: increased motion, improved balance, decreased pain, increased strength and return to sport/leisure activities          Objective     Active Range of Motion   Left Knee   Flexion: 125 degrees   Extension: 0 degrees     Right Knee   Flexion: 125 degrees   Extension: 0 degrees     Additional Active Range of Motion Details  Sensation intact to light touch L3,4,5,S1,S2  Genu valgum   Hip Strength  L Flex 3+ Ext 5 Abd 3+ Add 4  R Flex 3+ Ext 5 Abd 3+ Add 4  Knee strength B 5/5 flex and ext  Palpation: pain along medial joint line and inferior patella B   Joint Mobility:  L AP PA ML LM 2/6 tib/fem, patella gross 3  R AP PA ML LM  tib fem 2/6, patella sup/inf G4, patella ML LM G5   STs:    TUG with rollator: 21 78"  Short shuffled steps with RW   Poor clearance                Precautions: Falls, B/L Lung transplant, hx of Idiopathic fibrosis, Hx of DVTs, Hx of Low Back Pain      Manuals 7/1            Patellar Carlson taping 10 + education                                                   Neuro Re-Ed             TA draw  Ed nv            Bridge trial             NBOS EC             NBOS EO Foam                                                    Ther Ex             Bike             QS for lubrication prn            SLR Flex 2 x 10            Hip Add 10" x 10            LAQ  1-2#           Hip Flex B             3 Way Hip B                          Ther Activity                                       Gait Training                                       Modalities             CP PRN

## 2020-07-09 ENCOUNTER — OFFICE VISIT (OUTPATIENT)
Dept: PHYSICAL THERAPY | Facility: CLINIC | Age: 70
End: 2020-07-09
Payer: MEDICARE

## 2020-07-09 DIAGNOSIS — M22.2X2 BILATERAL PATELLOFEMORAL SYNDROME: ICD-10-CM

## 2020-07-09 DIAGNOSIS — M17.0 BILATERAL PRIMARY OSTEOARTHRITIS OF KNEE: Primary | ICD-10-CM

## 2020-07-09 DIAGNOSIS — M22.2X1 BILATERAL PATELLOFEMORAL SYNDROME: ICD-10-CM

## 2020-07-09 PROCEDURE — 97110 THERAPEUTIC EXERCISES: CPT | Performed by: PHYSICAL MEDICINE & REHABILITATION

## 2020-07-09 PROCEDURE — 97112 NEUROMUSCULAR REEDUCATION: CPT | Performed by: PHYSICAL MEDICINE & REHABILITATION

## 2020-07-09 PROCEDURE — 97140 MANUAL THERAPY 1/> REGIONS: CPT | Performed by: PHYSICAL MEDICINE & REHABILITATION

## 2020-07-09 NOTE — PROGRESS NOTES
Daily Note     Today's date: 2020  Patient name: Isidro Mcleod  :   MRN: [id number]  Referring provider: Janel Shepherd  Dx:   Encounter Diagnosis     ICD-10-CM    1  Bilateral primary osteoarthritis of knee M17 0    2  Bilateral patellofemoral syndrome M22 2X1     M22 2X2                   Subjective: Patient notes continued B knee pain, fatigue  Objective: See treatment diary below  SaO2 post 5' bike 92%    Assessment: Tolerated treatment well  O2 saturation ranged between 91-93% within session  VCs for form maintenance throughout session with fair carryover  Patient demonstrated fatigue post treatment and would benefit from continued PT  Plan: Progress treatment as tolerated         Precautions: Falls, B/L Lung transplant, hx of Idiopathic fibrosis, Hx of DVTs, Hx of Low Back Pain      Manuals            Patellar Carlson taping 10 + education Performed,                                                   Neuro Re-Ed             TA draw  Ed nv            Bridge trial  2x10           NBOS EC  NBOS EO 2x           NBOS EO Foam  2x                                                  Ther Ex             Bike  5'           QS for lubrication prn            SLR Flex 2 x 10 2x10 ea           Hip Add 10" x 10 10x10"           LAQ  1# 2x10 ea           Hip Flex B  2x10 ea, 1#           3 Way Hip B                          Ther Activity                                       Gait Training                                       Modalities             CP PRN

## 2020-07-14 ENCOUNTER — APPOINTMENT (OUTPATIENT)
Dept: PHYSICAL THERAPY | Facility: CLINIC | Age: 70
End: 2020-07-14
Payer: MEDICARE

## 2020-07-14 LAB
FERRITIN SERPL-MCNC: 6130 NG/ML (ref 16–288)
IRON SATN MFR SERPL: 75 % (CALC) (ref 16–45)
IRON SERPL-MCNC: 188 MCG/DL (ref 45–160)
TIBC SERPL-MCNC: 251 MCG/DL (CALC) (ref 250–450)

## 2020-07-15 ENCOUNTER — HOSPITAL ENCOUNTER (OUTPATIENT)
Dept: INFUSION CENTER | Facility: CLINIC | Age: 70
Discharge: HOME | End: 2020-07-15
Payer: MEDICARE

## 2020-07-15 VITALS — TEMPERATURE: 97.8 F

## 2020-07-15 DIAGNOSIS — N18.9 ANEMIA DUE TO CHRONIC KIDNEY DISEASE, UNSPECIFIED CKD STAGE: Primary | ICD-10-CM

## 2020-07-15 DIAGNOSIS — D63.1 ANEMIA DUE TO CHRONIC KIDNEY DISEASE, UNSPECIFIED CKD STAGE: Primary | ICD-10-CM

## 2020-07-15 PROCEDURE — 96372 THER/PROPH/DIAG INJ SC/IM: CPT

## 2020-07-15 RX ADMIN — DARBEPOETIN ALFA 200 MCG: 200 INJECTION, SOLUTION INTRAVENOUS; SUBCUTANEOUS at 11:04

## 2020-07-15 NOTE — PROGRESS NOTES
Pt here for aranesp injection  Pt offers no complaints, resting comfortably  Vitals stable  Labs reviewed HGB-10 6 on 7/13-meets parameters for injection today      Aranesp given in R arm without complications  Pt is aware of next appointments   Declined AVS

## 2020-07-16 ENCOUNTER — OFFICE VISIT (OUTPATIENT)
Dept: PHYSICAL THERAPY | Facility: CLINIC | Age: 70
End: 2020-07-16
Payer: MEDICARE

## 2020-07-16 DIAGNOSIS — M17.0 BILATERAL PRIMARY OSTEOARTHRITIS OF KNEE: Primary | ICD-10-CM

## 2020-07-16 DIAGNOSIS — M22.2X2 BILATERAL PATELLOFEMORAL SYNDROME: ICD-10-CM

## 2020-07-16 DIAGNOSIS — M22.2X1 BILATERAL PATELLOFEMORAL SYNDROME: ICD-10-CM

## 2020-07-16 PROCEDURE — 97112 NEUROMUSCULAR REEDUCATION: CPT

## 2020-07-16 PROCEDURE — 97110 THERAPEUTIC EXERCISES: CPT

## 2020-07-16 PROCEDURE — 97140 MANUAL THERAPY 1/> REGIONS: CPT

## 2020-07-16 NOTE — PROGRESS NOTES
Daily Note     Today's date: 2020  Patient name: Mustapha Vázquez  :   MRN: [id number]  Referring provider: Rj Salinas  Dx:   Encounter Diagnosis     ICD-10-CM    1  Bilateral primary osteoarthritis of knee M17 0    2  Bilateral patellofemoral syndrome M22 2X1     M22 2X2                   Subjective: patient states that she has not noticed a significant change in status since last treatment session  Objective: See treatment diary below      Assessment: Tolerated treatment well  Patient exhibited good technique with therapeutic exercises      Plan: Continue per plan of care        Precautions: Falls, B/L Lung transplant, hx of Idiopathic fibrosis, Hx of DVTs, Hx of Low Back Pain      Manuals           Patellar Carlson taping 10 + education Performed, LH 5 min                                                  Neuro Re-Ed            TA draw  Ed nv            Bridge trial  2x10 2 x 10           NBOS EC  NBOS EO 2x NBOS           NBOS EO Foam  2x 2x                                                 Ther Ex             Bike  5' 6           QS for lubrication prn            SLR Flex 2 x 10 2x10 ea 2 x 10ea          Hip Add 10" x 10 10x10" :10x10           LAQ  1# 2x10 ea 1# 1 x 10           Hip Flex B  2x10 ea, 1# 1# 1 x 10           3 Way Hip B   Flex/abd 1 x 10                        Ther Activity                                       Gait Training                                       Modalities             CP PRN

## 2020-07-21 ENCOUNTER — OFFICE VISIT (OUTPATIENT)
Dept: PHYSICAL THERAPY | Facility: CLINIC | Age: 70
End: 2020-07-21
Payer: MEDICARE

## 2020-07-21 DIAGNOSIS — M22.2X1 BILATERAL PATELLOFEMORAL SYNDROME: ICD-10-CM

## 2020-07-21 DIAGNOSIS — M17.0 BILATERAL PRIMARY OSTEOARTHRITIS OF KNEE: Primary | ICD-10-CM

## 2020-07-21 DIAGNOSIS — M22.2X2 BILATERAL PATELLOFEMORAL SYNDROME: ICD-10-CM

## 2020-07-21 PROCEDURE — 97010 HOT OR COLD PACKS THERAPY: CPT

## 2020-07-21 PROCEDURE — 97110 THERAPEUTIC EXERCISES: CPT

## 2020-07-21 NOTE — PROGRESS NOTES
Daily Note     Today's date: 2020  Patient name: Enma Shi  : [birth year]  MRN: [id number]  Referring provider: Keo Harrison  Dx:   Encounter Diagnosis     ICD-10-CM    1  Bilateral primary osteoarthritis of knee M17 0    2  Bilateral patellofemoral syndrome M22 2X1     M22 2X2                   Subjective: Patient stated that her knees have been giving her trouble for the past 2 weeks, mostly when STS  Objective: See treatment diary below      Assessment: Tolerated treatment fair  Patient required VC to correct posture for standing TE, with fair carryover  Patient quickly fatigued and SOB with all TE, requiring prolonged rests between sets SPO2 92%  Patientent was having difficulty rising form chair after rest, would recommend adding STS training  Educated patient on pursed lip breathing, with good carryover throughout session  Trailed CP this session, with good result for pain reduction  Patient would benefit form continued PT  Plan: Continue per plan of care       Precautions: Falls, B/L Lung transplant, hx of Idiopathic fibrosis, Hx of DVTs, Hx of Low Back Pain      Manuals          Patellar Carlson taping 10 + education Performed, LH 5 min  NP                                                Neuro Re-Ed           TA draw  Ed nv            Bridge trial  2x10 2 x 10           NBOS EC  NBOS EO 2x NBOS  NBOS 2x         NBOS EO Foam  2x 2x NBOS EO                                                Ther Ex           Bike  5' 6  6'         QS for lubrication prn            SLR Flex 2 x 10 2x10 ea 2 x 10ea          Hip Add 10" x 10 10x10" :10x10           LAQ  1# 2x10 ea 1# 1 x 10  1# x10         Hip Flex B  2x10 ea, 1# 1# 1 x 10  seated 1# x10         3 Way Hip B   Flex/abd 1 x 10  Flex/abd 1x10                      Ther Activity                                       Gait Training                                       Modalities             CP PRN    8 min

## 2020-07-22 ENCOUNTER — OFFICE VISIT (OUTPATIENT)
Dept: HEMATOLOGY ONCOLOGY | Facility: CLINIC | Age: 70
End: 2020-07-22
Payer: MEDICARE

## 2020-07-22 VITALS
HEART RATE: 86 BPM | HEIGHT: 66 IN | BODY MASS INDEX: 24.51 KG/M2 | TEMPERATURE: 99.6 F | DIASTOLIC BLOOD PRESSURE: 70 MMHG | RESPIRATION RATE: 20 BRPM | OXYGEN SATURATION: 97 % | WEIGHT: 152.5 LBS | SYSTOLIC BLOOD PRESSURE: 134 MMHG

## 2020-07-22 DIAGNOSIS — R79.89 HIGH SERUM FERRITIN: Primary | ICD-10-CM

## 2020-07-22 PROCEDURE — 4040F PNEUMOC VAC/ADMIN/RCVD: CPT | Performed by: INTERNAL MEDICINE

## 2020-07-22 PROCEDURE — 1160F RVW MEDS BY RX/DR IN RCRD: CPT | Performed by: INTERNAL MEDICINE

## 2020-07-22 PROCEDURE — 3008F BODY MASS INDEX DOCD: CPT | Performed by: INTERNAL MEDICINE

## 2020-07-22 PROCEDURE — 1036F TOBACCO NON-USER: CPT | Performed by: INTERNAL MEDICINE

## 2020-07-22 PROCEDURE — 99214 OFFICE O/P EST MOD 30 MIN: CPT | Performed by: INTERNAL MEDICINE

## 2020-07-22 NOTE — PROGRESS NOTES
HEMATOLOGY / ONCOLOGY CLINIC NOTE    Primary Care Provider: Darlene Parikh DO  Referring Provider:    MRN: [id number]  : 1950    Reason for Encounter:    Chief Complaint   Patient presents with   Irma [facility]quintons Follow-up         Hematology / Oncology History:     Joe Phillips is a 65 y o  female who came in for follow up  Anemia : Normocytic; multifactorial, likely drug (tacrolimus) related , and CKD  repeat work up showed no blood loss or hemolysis  Iron panel is normal, no bleeding  no major abnormalities per patient  Suggesting the anemia is likely due to bone marrow suppression by transplant/tacrolimus  Patient had a bone marrow biopsy in [facility] showed          - 2018:  received 4  iv iron Venofer 300 mg iv weekly  ;   Aranesp 100 mcg weekly x 4   - :  Received 1 unit of PRBC transfusion  - 10/10 : plan to give feraheme 510 mg iv weekly x 2 , Aranesp 100 mcg weekly x 4      - 2019:  Plan to start Feraheme 500 mg IV every 4 weeks, Aranesp 100 mics subcu injection every 4 weeks  - 2020: Feraheme, Aranesp 200 mcg subcu injection alternate Q 2 w   - 2020:  Cancel feraheme ,  continue Aranesp 200 mcg subcu injection due to iron overload  current treatment:  As above      Interval History:     3/20 :  Patient came in for follow-up  Reported no bleeding  Strength stable  Jose dyspnea while walking a long distance  Has been reported patient still appears pale  Patient stopped following hematology in [facility]  :  Came in for follow-up  Reported doing well, no bleeding  Strengths is weak however stable  10/10 : Came in for follow-up  Reported feeling fatigued  No bleeding  No other complaints  4/10/2019:  Patient reported overall has been doing well  She feels the energy has been increasing  No bleeding  Reported her tremor has been getting worse, she scheduled to see a neurologist     2019:  Giovanna Hewitt for follow-up    Reported doing well  Reported still having fatigue  No bleeding no other issues  Following transplant doctor, considered to further lower dose of tacrolimus  2/4/2020 : Came for follow-up  Reported overall has been doing okay, no bleeding  No other issues  6/23/2020 :   Came for follow-up, reported overall doing okay, recently the of some joint pain, after steroid intra-articular injection in has been much better  Body weight stable, no bleeding no other issues     7/22/2020 :   Came in follow-up, doing well  Has some knee pain, no new issues  Problem list:       Patient Active Problem List   Diagnosis    Lung [facility] status, bilateral [facility] [address] [facility]    ROSEANNE (acute kidney injury) ([facility] [address] )    Neutropenia, drug-induced (HCC)    Anemia    Right shoulder pain    Spinal stenosis    Stage 3 chronic kidney disease (HCC)    Tremor    Acute embolism and thrombosis of deep vein of both distal lower extremities (HCC)    Chronic kidney disease, stage IV (severe) (HCC)       Assessment / Plan:       1  Anemia in chronic kidney disease, unspecified CKD stage       - Iron, TIBC and Ferritin Panel; Future  - Hemochromatosis mutation; Future          -     Hemoglobin remains stable  -     Off iron, only on   Aranesp, expecting iron level will decrease ; Not a candidate for iron chelation  Will check iron panel in 6 months, hemochromatosis mutation in 6 months and follow-up after       2, neutropenia is stable              25    minutes were spent on this visit  All questions answered to satisfaction; Advised pt to call if there is any further questions  PHYSICIAL EXAMINATION:       Vital Signs:   [unfilled]  Body mass index is 24 61 kg/m²  Body surface area is 1 78 meters squared  appears pale, appears comfortable, no other major findings on physical examination  GEN: Alert, awake oriented x3, in no acute distress;     Wearing mask, appears pale    HEENT- No pallor, icterus, cyanosis, no oral mucosal lesions,   LAD - no palpable cervical, clavicle, axillary, inguinal LAD  Heart- normal S1 S2, regular rate and rhythm, No murmur, rubs  Lungs- clear breathing sound bilateral    Abdomen- soft, Non tender, bowel sounds present  Extremities- No cyanosis, clubbing, edema  Neuro- No focal neurological deficit           PAST MEDICAL HISTORY:   has a past medical history of Anxiety, Arthritis, Blood clot of neck vein, Broken leg, H/O pulmonary fibrosis, High cholesterol, Kidney disease, Lung disease, Pneumonitis, and Spinal stenosis  PAST SURGICAL HISTORY:   has a past surgical history that includes Lung transplant, double (09/17/2015); Appendectomy; Tonsillectomy; and Knee arthroscopy  CURRENT MEDICATIONS:     Current Outpatient Medications   Medication Sig Dispense Refill    atovaquone (MEPRON) 750 mg/5 mL suspension Take 750 mg by mouth 2 (two) times a day   Calcium Carb-Cholecalciferol (CALCIUM 600+D) 600-800 MG-UNIT TABS Take by mouth 2 (two) times a day      citalopram (CeleXA) 10 mg tablet Take 10 mg by mouth daily      Cyanocobalamin (HM SUPER VITAMIN B12 PO) Take 1 tablet by mouth daily   cycloSPORINE (RESTASIS) 0 05 % ophthalmic emulsion 1 drop 2 (two) times a day      diclofenac sodium (VOLTAREN) 1 % Apply 2 g topically 4 (four) times a day Apply to 2g qid to hands bilaterally 100 g 2    docusate sodium (COLACE) 100 mg capsule Take 100 mg by mouth as needed for constipation   gabapentin (NEURONTIN) 300 mg capsule Take 300 mg by mouth 2 (two) times a day      lidocaine (XYLOCAINE) 5 % ointment Apply topically 3 (three) times a day as needed for mild pain 35 44 g 1    Magnesium Cl-Calcium Carbonate (SLOW-MAG PO) Take 128 mg by mouth daily       metoprolol tartrate (LOPRESSOR) 25 mg tablet Take 25 mg by mouth daily      Multiple Vitamins-Minerals (MULTIPLE VITAMINS/WOMENS PO) Take by mouth daily          mycophenolate (CELLCEPT) 250 mg capsule Take 1,000 mg by mouth 2 (two) times a day        mycophenolate (MYFORTIC) 360 MG TBEC Take 720 mg by mouth 2 (two) times a day      pantoprazole (PROTONIX) 40 mg tablet Take 40 mg by mouth daily   pravastatin (PRAVACHOL) 40 mg tablet Take 40 mg by mouth daily   predniSONE 5 mg tablet Take 5 mg by mouth daily   risedronate (ACTONEL) 35 mg tablet       risedronate (ACTONEL) 5 MG tablet Take 5 mg by mouth every morning before breakfast with water on empty stomach, nothing by mouth or lie down for next 30 minutes   Tacrolimus (PROGRAF PO) Take 2 mg by mouth 2 (two) times a day       tacrolimus (PROGRAF) 1 mg capsule TK 3 CS PO BID      traMADol (ULTRAM) 50 mg tablet Take 50 mg by mouth daily        Vitamin D, Ergocalciferol, 2000 units CAPS 2 (two) times a day      zolpidem (AMBIEN) 5 mg tablet Take 1 tablet (5 mg total) by mouth daily at bedtime as needed for sleep 30 tablet 3    tacrolimus (PROGRAF) 0 5 mg capsule TK 2 CS PO BID       No current facility-administered medications for this visit  [unfilled]    SOCIAL HISTORY:   reports that she has never smoked  She has never used smokeless tobacco  She reports that she does not drink alcohol or use drugs  FAMILY HISTORY:  family history includes COPD in her father; Cancer in her maternal grandfather; Hypertension in her father and mother; Lung cancer in her father; Lupus in her other and sister; No Known Problems in her sister; Skin cancer in her mother; Stomach cancer in her maternal uncle  ALLERGIES:  is allergic to zofran [ondansetron] and bactrim [sulfamethoxazole-trimethoprim]  REVIEW OF SYSTEMS:  Please note that a 14-point review of systems was performed to include Constitutional, HEENT, Respiratory, CVS, GI, , Musculoskeletal, Integumentary, Neurologic, Rheumatologic, Endocrinologic, Psychiatric, Lymphatic, and Hematologic/Oncologic systems were reviewed and are negative unless otherwise stated in HPI   Positive and negative findings pertinent to this evaluation are incorporated into the history of present illness  LAB:    Lab Results   Component Value Date    WBC 9 69 2019    HGB 10 1 (L) 2019    HCT 33 2 (L) 2019     (H) 2019     2019       Lab Results   Component Value Date     (L) 2016    K 6 2 (H) 2016     (H) 2016    CO2 18 (L) 2016    ANIONGAP 5 2015    BUN 42 (H) 2016    CREATININE 1 57 (H) 2016    GLUCOSE 115 2015    CALCIUM 8 3 2016    AST 27 2016    ALT 33 2016    ALKPHOS 93 2016    PROT 5 8 (L) 2016    BILITOT 0 2 2016    EGFR 32 8 2016       IMAGING:    No orders to display     No results found  HEMATOLOGY / ONCOLOGY CLINIC NOTE    Primary Care Provider: Yung Espinal DO  Referring Provider:    MRN: [id number]  : 1950    Reason for Encounter:    Chief Complaint   Patient presents with   Jamila Ahumada Follow-up         Hematology / Oncology History:     Noman Ohara is a 65 y o  female who came in for follow up  Anemia : Normocytic; multifactorial, likely drug (tacrolimus) related , and CKD  repeat work up showed no blood loss or hemolysis  Patient had a bone marrow biopsy in [facility] showed no major abnormalities per patient  Hemoglobin:  6 9  (),  7 2   (),  7 4 (2018),   8 1 ()  ,  7 5 (10/8/2018)    - 2018:  received 4  iv iron Venofer 300 mg iv weekly  ;   Aranesp 100 mcg weekly x 4   - :  Received 1 unit of PRBC transfusion  - 10/10 : plan to give feraheme 510 mg iv weekly x 2 , Aranesp 100 mcg weekly x 4      - 2019:  Plan to start Feraheme 500 mg IV every 4 weeks, Aranesp 100 mics subcu injection every 4 weeks  - current treatment:  As above      Interval History:     3/20 :  Patient came in for follow-up  Reported no bleeding  Strength stable  Jose dyspnea while walking a long distance    Has been reported patient still appears pale  Patient stopped following hematology in [facility]  7/16:  Came in for follow-up  Reported doing well, no bleeding  Strengths is weak however stable  10/10 : Came in for follow-up  Reported feeling fatigued  No bleeding  No other complaints  4/10/2019:  Patient reported overall has been doing well  She feels the energy has been increasing  No bleeding  Reported her tremor has been getting worse, she scheduled to see a neurologist     Problem list:       Patient Active Problem List   Diagnosis    Lung transplant status, bilateral ([address] )    ROSEANNE (acute kidney injury) ([facility] [address] )    Neutropenia, drug-induced ([facility] [address] )    Anemia    Right shoulder pain    Spinal stenosis    Stage 3 chronic kidney disease ([facility] [address] )    Tremor    Acute embolism and thrombosis of deep vein of both distal lower extremities (HCC)    Chronic kidney disease, stage IV (severe) ([facility] [address] )       Assessment / Plan:       1  Anemia in chronic kidney disease, unspecified CKD stage   -  Macrocytic; multifactorial, likely drug (tacrolimus) - related , and CKD  repeat work up in 10/2017 showed no blood loss or hemolysis  Iron panel is normal, no bleeding  Suggesting the anemia is likely due to bone marrow suppression by transplant/tacrolimus      -         patient appears responded to last Feraheme and Aranesp  Her hemoglobin has increased to 11 5 in 11/2018, not gradually decreased to 8 5  We decided to start regular monthly based Feraheme and Aranesp  Will follow patient in 3 months with pre visit lab  Go over hemoglobin is 10 to 11  Plan  -     Give Feraheme and Aranesp every 4 weeks, follow-up in 3 months         2, neutropenia is stable    3, high risk for iron def  - high risk for iron def due to on Procrit  Need iv iron support  25    minutes were spent on this visit  All questions answered to satisfaction; Advised pt to call if there is any further questions  PHYSICIAL EXAMINATION:       Vital Signs:   [unfilled]  Body mass index is 24 61 kg/m²  Body surface area is 1 78 meters squared  No change from last visit  GEN: Alert, awake oriented x3, in no acute distress;     Wearing mask, appears pale  HEENT- No pallor, icterus, cyanosis, no oral mucosal lesions,   LAD - no palpable cervical, clavicle, axillary, inguinal LAD  Heart- normal S1 S2, regular rate and rhythm, No murmur, rubs  Lungs- clear breathing sound bilateral    Abdomen- soft, Non tender, bowel sounds present  Extremities- No cyanosis, clubbing, edema  Neuro- No focal neurological deficit           PAST MEDICAL HISTORY:   has a past medical history of Anxiety, Arthritis, Blood clot of neck vein, Broken leg, H/O pulmonary fibrosis, High cholesterol, Kidney disease, Lung disease, Pneumonitis, and Spinal stenosis  PAST SURGICAL HISTORY:   has a past surgical history that includes Lung transplant, double (09/17/2015); Appendectomy; Tonsillectomy; and Knee arthroscopy  CURRENT MEDICATIONS:     Current Outpatient Medications   Medication Sig Dispense Refill    atovaquone (MEPRON) 750 mg/5 mL suspension Take 750 mg by mouth 2 (two) times a day   Calcium Carb-Cholecalciferol (CALCIUM 600+D) 600-800 MG-UNIT TABS Take by mouth 2 (two) times a day      citalopram (CeleXA) 10 mg tablet Take 10 mg by mouth daily      Cyanocobalamin (HM SUPER VITAMIN B12 PO) Take 1 tablet by mouth daily   cycloSPORINE (RESTASIS) 0 05 % ophthalmic emulsion 1 drop 2 (two) times a day      diclofenac sodium (VOLTAREN) 1 % Apply 2 g topically 4 (four) times a day Apply to 2g qid to hands bilaterally 100 g 2    docusate sodium (COLACE) 100 mg capsule Take 100 mg by mouth as needed for constipation          gabapentin (NEURONTIN) 300 mg capsule Take 300 mg by mouth 2 (two) times a day      lidocaine (XYLOCAINE) 5 % ointment Apply topically 3 (three) times a day as needed for mild pain 35 44 g 1    Magnesium Cl-Calcium Carbonate (SLOW-MAG PO) Take 128 mg by mouth daily       metoprolol tartrate (LOPRESSOR) 25 mg tablet Take 25 mg by mouth daily      Multiple Vitamins-Minerals (MULTIPLE VITAMINS/WOMENS PO) Take by mouth daily   mycophenolate (CELLCEPT) 250 mg capsule Take 1,000 mg by mouth 2 (two) times a day        mycophenolate (MYFORTIC) 360 MG TBEC Take 720 mg by mouth 2 (two) times a day      pantoprazole (PROTONIX) 40 mg tablet Take 40 mg by mouth daily   pravastatin (PRAVACHOL) 40 mg tablet Take 40 mg by mouth daily   predniSONE 5 mg tablet Take 5 mg by mouth daily   risedronate (ACTONEL) 35 mg tablet       risedronate (ACTONEL) 5 MG tablet Take 5 mg by mouth every morning before breakfast with water on empty stomach, nothing by mouth or lie down for next 30 minutes   Tacrolimus (PROGRAF PO) Take 2 mg by mouth 2 (two) times a day       tacrolimus (PROGRAF) 1 mg capsule TK 3 CS PO BID      traMADol (ULTRAM) 50 mg tablet Take 50 mg by mouth daily        Vitamin D, Ergocalciferol, 2000 units CAPS 2 (two) times a day      zolpidem (AMBIEN) 5 mg tablet Take 1 tablet (5 mg total) by mouth daily at bedtime as needed for sleep 30 tablet 3    tacrolimus (PROGRAF) 0 5 mg capsule TK 2 CS PO BID       No current facility-administered medications for this visit  [unfilled]    SOCIAL HISTORY:   reports that she has never smoked  She has never used smokeless tobacco  She reports that she does not drink alcohol or use drugs  FAMILY HISTORY:  family history includes COPD in her father; Cancer in her maternal grandfather; Hypertension in her father and mother; Lung cancer in her father; Lupus in her other and sister; No Known Problems in her sister; Skin cancer in her mother; Stomach cancer in her maternal uncle  ALLERGIES:  is allergic to zofran [ondansetron] and bactrim [sulfamethoxazole-trimethoprim]      REVIEW OF SYSTEMS:  Please note that a 14-point review of systems was performed to include Constitutional, HEENT, Respiratory, CVS, GI, , Musculoskeletal, Integumentary, Neurologic, Rheumatologic, Endocrinologic, Psychiatric, Lymphatic, and Hematologic/Oncologic systems were reviewed and are negative unless otherwise stated in HPI  Positive and negative findings pertinent to this evaluation are incorporated into the history of present illness  LAB:    Lab Results   Component Value Date    WBC 9 69 09/25/2019    HGB 10 1 (L) 09/25/2019    HCT 33 2 (L) 09/25/2019     (H) 09/25/2019     09/25/2019       Lab Results   Component Value Date     (L) 06/16/2016    K 6 2 (H) 06/24/2016     (H) 06/24/2016    CO2 18 (L) 06/24/2016    ANIONGAP 5 02/16/2015    BUN 42 (H) 06/24/2016    CREATININE 1 57 (H) 06/24/2016    GLUCOSE 115 02/16/2015    CALCIUM 8 3 06/24/2016    AST 27 06/24/2016    ALT 33 06/24/2016    ALKPHOS 93 06/24/2016    PROT 5 8 (L) 06/16/2016    BILITOT 0 2 06/16/2016    EGFR 32 8 06/24/2016       IMAGING:    No orders to display     No results found

## 2020-07-23 ENCOUNTER — OFFICE VISIT (OUTPATIENT)
Dept: PHYSICAL THERAPY | Facility: CLINIC | Age: 70
End: 2020-07-23
Payer: MEDICARE

## 2020-07-23 DIAGNOSIS — M22.2X2 BILATERAL PATELLOFEMORAL SYNDROME: ICD-10-CM

## 2020-07-23 DIAGNOSIS — M17.0 BILATERAL PRIMARY OSTEOARTHRITIS OF KNEE: Primary | ICD-10-CM

## 2020-07-23 DIAGNOSIS — M22.2X1 BILATERAL PATELLOFEMORAL SYNDROME: ICD-10-CM

## 2020-07-23 PROCEDURE — 97112 NEUROMUSCULAR REEDUCATION: CPT

## 2020-07-23 PROCEDURE — 97110 THERAPEUTIC EXERCISES: CPT

## 2020-07-23 NOTE — PROGRESS NOTES
Daily Note     Today's date: 2020  Patient name: Joe Phillips  : [birth year]  MRN: [id number]  Referring provider: Aurora Huerta  Dx:   Encounter Diagnosis     ICD-10-CM    1  Bilateral primary osteoarthritis of knee M17 0    2  Bilateral patellofemoral syndrome M22 2X1     M22 2X2                   Subjective: Patient states that she continues to experience moderate amount of pain in both knees but notices an improvement with pain with transfers with tape in place  Objective: See treatment diary below      Assessment: Tolerated treatment fair  Patient exhibited good technique with therapeutic exercises  However fatigued quickly and required frequent breaks   Some difficult breathing during today's treatment session however patient attributes this to humidity  Plan: Continue per plan of care        Precautions: Falls, B/L Lung transplant, hx of Idiopathic fibrosis, Hx of DVTs, Hx of Low Back Pain      Manuals         Patellar Carlson taping 10 + education Performed, LH 5 min  NP np- performed at home                                               Neuro Re-Ed          TA draw  Ed nv            Bridge trial  2x10 2 x 10           NBOS EC  NBOS EO 2x NBOS  NBOS 2x :30 x 2        NBOS EO Foam  2x 2x NBOS EO :30 x3                                               Ther Ex          Bike  5' 6  6' 6        QS for lubrication prn             SLR Flex 2 x 10 2x10 ea 2 x 10ea  2 x 10         Hip Add 10" x 10 10x10" :10x10   :10x10         LAQ  1# 2x10 ea 1# 1 x 10  1# x10 1# x 10         Hip Flex B  2x10 ea, 1# 1# 1 x 10  seated 1# x10 seated 1# x 10         3 Way Hip B   Flex/abd 1 x 10  Flex/abd 1x10 3 way x 10         HR/TR             Ther Activity                                       Gait Training                                       Modalities             CP PRN    8 min 8 min

## 2020-07-28 ENCOUNTER — OFFICE VISIT (OUTPATIENT)
Dept: PHYSICAL THERAPY | Facility: CLINIC | Age: 70
End: 2020-07-28
Payer: MEDICARE

## 2020-07-28 DIAGNOSIS — M22.2X1 BILATERAL PATELLOFEMORAL SYNDROME: ICD-10-CM

## 2020-07-28 DIAGNOSIS — M17.0 BILATERAL PRIMARY OSTEOARTHRITIS OF KNEE: Primary | ICD-10-CM

## 2020-07-28 DIAGNOSIS — M22.2X2 BILATERAL PATELLOFEMORAL SYNDROME: ICD-10-CM

## 2020-07-28 PROCEDURE — 97110 THERAPEUTIC EXERCISES: CPT

## 2020-07-28 PROCEDURE — 97112 NEUROMUSCULAR REEDUCATION: CPT

## 2020-07-28 NOTE — PROGRESS NOTES
Daily Note     Today's date: 2020  Patient name: Wing Hnog  : [birth year]  MRN: [id number]  Referring provider: Jing Garay  Dx:   Encounter Diagnosis     ICD-10-CM    1  Bilateral primary osteoarthritis of knee M17 0    2  Bilateral patellofemoral syndrome M22 2X1     M22 2X2            1:1 with PTA CR 2:00- 2:40  CP 2:40-2:48  Subjective: Continues with limited tolerance to weightbearing activities at home due to B/L knee pain  Continues to apply tape at home  Objective: See treatment diary below      Assessment: Tolerated treatment fair  Able to progress repetitions  Pain/difficulty with transfers  Patient exhibited good technique with therapeutic exercises  SOB due to heat/humidity- recovers with frequent rest breaks  Plan: Continue per plan of care  Precautions: Falls, B/L Lung transplant, hx of Idiopathic fibrosis, Hx of DVTs, Hx of Low Back Pain      Manuals        Patellar Carlson taping 10 + education Performed, LH 5 min  NP np- performed at home Home                                               Neuro Re-Ed         TA draw  Ed nv            Bridge trial  2x10 2 x 10           NBOS EC  NBOS EO 2x NBOS  NBOS 2x :30 x 2 30"x3       NBOS EO Foam  2x 2x NBOS EO :30 x3 30"x3                                              Ther Ex         Bike  5' 6  6' 6 6 mins       QS for lubrication prn             SLR Flex 2 x 10 2x10 ea 2 x 10ea  2 x 10  10 ea  Hip Add 10" x 10 10x10" :10x10   :10x10  10"x10       LAQ  1# 2x10 ea 1# 1 x 10  1# x10 1# x 10  1#  12       Hip Flex B  2x10 ea, 1# 1# 1 x 10  seated 1# x10 seated 1# x 10  Seated  1#  12 ea  3 Way Hip B   Flex/abd 1 x 10  Flex/abd 1x10 3 way x 10  3-way  12 ea         HR/TR             Ther Activity                                       Gait Training                                       Modalities             CP PRN    8 min 8 min

## 2020-07-30 ENCOUNTER — EVALUATION (OUTPATIENT)
Dept: PHYSICAL THERAPY | Facility: CLINIC | Age: 70
End: 2020-07-30
Payer: MEDICARE

## 2020-07-30 DIAGNOSIS — M22.2X1 BILATERAL PATELLOFEMORAL SYNDROME: ICD-10-CM

## 2020-07-30 DIAGNOSIS — M22.2X2 BILATERAL PATELLOFEMORAL SYNDROME: ICD-10-CM

## 2020-07-30 DIAGNOSIS — M17.0 BILATERAL PRIMARY OSTEOARTHRITIS OF KNEE: Primary | ICD-10-CM

## 2020-07-30 PROCEDURE — 97112 NEUROMUSCULAR REEDUCATION: CPT | Performed by: PHYSICAL THERAPIST

## 2020-07-30 PROCEDURE — 97140 MANUAL THERAPY 1/> REGIONS: CPT | Performed by: PHYSICAL THERAPIST

## 2020-07-30 PROCEDURE — 97110 THERAPEUTIC EXERCISES: CPT | Performed by: PHYSICAL THERAPIST

## 2020-07-30 NOTE — PROGRESS NOTES
PT Evaluation     Today's date: 2020  Patient name: Huyen Quintero  :   MRN: [id number]  Referring provider: Giovanna Saenz  Dx:   Encounter Diagnosis     ICD-10-CM    1  Bilateral primary osteoarthritis of knee M17 0    2  Bilateral patellofemoral syndrome M22 2X1     M22 2X2                   Assessment  Assessment details: While pt did not show improvement with her pain levels, she has shown improvement with her mobility, strength, range, flexibility and tolerance to activity and while still a risk for falls, she has not had one subjectively and has an excellent home set up and support  She demonstrates independence with taping and this was one of her largest priorities  If pt is to have a decline in any form she is welcome to return as needed    Thank you very much for this kind referral       Impairments: abnormal gait, abnormal or restricted ROM, activity intolerance, impaired balance, impaired physical strength, lacks appropriate home exercise program, pain with function and poor posture   Understanding of Dx/Px/POC: good   Prognosis: good    Goals  STG 4 Weeks:  Decrease pain at worst to 4 -not met  Improve range to 130 -met  Improve strength to Hip to 4-/5 -met  Independent with HEP -met  LTG 8 Weeks:  Decrease pain at worst to 2/10 - not met  Improve strength to 4/5 -met  Able to perform all desired activities with minimal to nil symptom exacerbation -met      Plan  Patient would benefit from: skilled physical therapy  Planned modality interventions: cryotherapy, TENS and thermotherapy: hydrocollator packs  Planned therapy interventions: joint mobilization, manual therapy, abdominal trunk stabilization, balance, neuromuscular re-education, patient education, postural training, strengthening, stretching, therapeutic activities, therapeutic exercise, transfer training, home exercise program, graded motor, graded exercise, graded activity, gait training, functional ROM exercises and flexibility  Frequency: 2x week  Duration in weeks: 10  Treatment plan discussed with: patient        Subjective Evaluation    History of Present Illness  Date of onset: 2020  Mechanism of injury: Pt presents today stating that she feels her symptoms have all been about the same now that the shots have worn off  Sit to stand and when her knees are bent are the periods of time when her knees seem to offer her most pain, perhaps 8/10  She states once she is up and mobilizing she is without grief and is content to perform her desired activities  She reports that she is independent with her ability to perform walking and transferring as well as perform her taping technique which was her original goal to perform PT  Pt still using Rolling walker, denies any falls within the last month  Quality of life: good    Pain  Current pain ratin  At best pain ratin  At worst pain ratin  Quality: dull ache and sharp  Relieving factors: change in position and medications  Aggravating factors: standing, walking and stair climbing  Progression: improved      Diagnostic Tests  X-ray: normal  Treatments  Previous treatment: physical therapy  Patient Goals  Patient goals for therapy: increased motion, improved balance, decreased pain, increased strength and return to sport/leisure activities          Objective     Active Range of Motion   Left Knee   Flexion: 125 degrees   Extension: 0 degrees     Right Knee   Flexion: 125 degrees   Extension: 0 degrees     Additional Active Range of Motion Details  Sensation intact to light touch L3,4,5,S1,S2  Genu valgum   Hip Strength  L Flex 4 Ext 5 Abd 4 Add 4  R Flex 4 Ext 5 Abd 4 Add 4  Knee strength B 5/5 flex and ext  Palpation: pain along medial joint line and inferior patella B   Joint Mobility:  L AP PA ML LM 2/6 tib/fem, patella gross 3    R AP PA ML LM  tib fem 3/6, patella sup/inf G4, patella ML LM G5   STs:    TUG with rollator: 21 78"   // 18 6"  Short shuffled steps with RW  Poor clearance                Precautions: Falls, B/L Lung transplant, hx of Idiopathic fibrosis, Hx of DVTs, Hx of Low Back Pain      Manuals 7/1 7/9 7/16 7/21 7/23 7/28 7/30      Patellar Carlson taping 10 + education Performed, LH 5 min  NP np- performed at home Home               assessment x 15                                Neuro Re-Ed  7/9 7/16 7/21 7/23 7/28 7/30      TA draw  Ed nv            Bridge trial  2x10 2 x 10           NBOS EC  NBOS EO 2x NBOS  NBOS 2x :30 x 2 30"x3 30" x 3      NBOS EO Foam  2x 2x NBOS EO :30 x3 30"x3 30" x 3                                             Ther Ex  7/9 7/21 7/23 7/28 7/30      Bike  5' 6  6' 6 6 mins 6 min      QS for lubrication prn             SLR Flex 2 x 10 2x10 ea 2 x 10ea  2 x 10  10 ea  10      Hip Add 10" x 10 10x10" :10x10   :10x10  10"x10 10" x 10      LAQ  1# 2x10 ea 1# 1 x 10  1# x10 1# x 10  1#  12 1# x 10      Hip Flex B  2x10 ea, 1# 1# 1 x 10  seated 1# x10 seated 1# x 10  Seated  1#  12 ea  1# x 10      3 Way Hip B   Flex/abd 1 x 10  Flex/abd 1x10 3 way x 10  3-way  12 ea   3 way x 10      HR/TR             Ther Activity                                       Gait Training                                       Modalities             CP PRN    8 min 8 min

## 2020-08-12 ENCOUNTER — HOSPITAL ENCOUNTER (OUTPATIENT)
Dept: INFUSION CENTER | Facility: CLINIC | Age: 70
Discharge: HOME | End: 2020-08-12
Payer: MEDICARE

## 2020-08-12 VITALS — SYSTOLIC BLOOD PRESSURE: 142 MMHG | TEMPERATURE: 96.4 F | DIASTOLIC BLOOD PRESSURE: 68 MMHG

## 2020-08-12 DIAGNOSIS — N18.9 ANEMIA DUE TO CHRONIC KIDNEY DISEASE, UNSPECIFIED CKD STAGE: Primary | ICD-10-CM

## 2020-08-12 DIAGNOSIS — D63.1 ANEMIA DUE TO CHRONIC KIDNEY DISEASE, UNSPECIFIED CKD STAGE: Primary | ICD-10-CM

## 2020-08-12 PROCEDURE — 96372 THER/PROPH/DIAG INJ SC/IM: CPT

## 2020-08-12 RX ADMIN — DARBEPOETIN ALFA 200 MCG: 200 INJECTION, SOLUTION INTRAVENOUS; SUBCUTANEOUS at 10:18

## 2020-08-17 NOTE — PROGRESS NOTES
PT Discharge    Today's date: 2020  Patient name: Mustapha Vázquez  : 2029  MRN: [id number]  Referring provider: Rj Salinas  Dx:   Encounter Diagnosis     ICD-10-CM    1  Bilateral primary osteoarthritis of knee  M17 0 PT plan of care cert/re-cert   2  Bilateral patellofemoral syndrome  M22 2X1 PT plan of care cert/re-cert    M66 [id number]        Start Time: 1400  Stop Time: 1440  Total time in clinic (min): 40 minutes    Assessment/Plan  Pt has not been present since 2020  Pt's chart will be DC in compliance of facility policy as all Charts are DC within 30 days of last scheduled visit          Subjective    Objective    Flowsheet Rows      Most Recent Value   PT/OT G-Codes   Current Score  46   Projected Score  63

## 2020-09-09 ENCOUNTER — HOSPITAL ENCOUNTER (OUTPATIENT)
Dept: INFUSION CENTER | Facility: CLINIC | Age: 70
Discharge: HOME | End: 2020-09-09

## 2020-09-09 VITALS — TEMPERATURE: 96.9 F

## 2020-09-09 DIAGNOSIS — N18.9 ANEMIA DUE TO CHRONIC KIDNEY DISEASE, UNSPECIFIED CKD STAGE: Primary | ICD-10-CM

## 2020-09-09 DIAGNOSIS — D63.1 ANEMIA DUE TO CHRONIC KIDNEY DISEASE, UNSPECIFIED CKD STAGE: Primary | ICD-10-CM

## 2020-09-09 NOTE — PROGRESS NOTES
Patient is here for aransep  Labs reviewed form 9/8/20 and her HGB is 11 9 which does not meet the parameters   Next appointments made and a copy of her schedule given

## 2020-09-21 ENCOUNTER — TRANSCRIBE ORDERS (OUTPATIENT)
Dept: ADMINISTRATIVE | Facility: HOSPITAL | Age: 70
End: 2020-09-21

## 2020-09-21 DIAGNOSIS — T86.818 OTHER COMPLICATIONS OF LUNG TRANSPLANT (HCC): Primary | ICD-10-CM

## 2020-10-06 ENCOUNTER — TELEPHONE (OUTPATIENT)
Dept: OBGYN CLINIC | Facility: HOSPITAL | Age: 70
End: 2020-10-06

## 2020-10-06 ENCOUNTER — TELEPHONE (OUTPATIENT)
Dept: INFUSION CENTER | Facility: CLINIC | Age: 70
End: 2020-10-06

## 2020-10-07 ENCOUNTER — HOSPITAL ENCOUNTER (OUTPATIENT)
Dept: PULMONOLOGY | Facility: HOSPITAL | Age: 70
Discharge: HOME | End: 2020-10-07
Payer: MEDICARE

## 2020-10-07 ENCOUNTER — HOSPITAL ENCOUNTER (OUTPATIENT)
Dept: INFUSION CENTER | Facility: CLINIC | Age: 70
Discharge: HOME | End: 2020-10-07
Payer: MEDICARE

## 2020-10-07 VITALS — DIASTOLIC BLOOD PRESSURE: 60 MMHG | SYSTOLIC BLOOD PRESSURE: 112 MMHG

## 2020-10-07 DIAGNOSIS — N18.9 ANEMIA DUE TO CHRONIC KIDNEY DISEASE, UNSPECIFIED CKD STAGE: Primary | ICD-10-CM

## 2020-10-07 DIAGNOSIS — T86.818 OTHER COMPLICATIONS OF LUNG TRANSPLANT (HCC): ICD-10-CM

## 2020-10-07 DIAGNOSIS — D63.1 ANEMIA DUE TO CHRONIC KIDNEY DISEASE, UNSPECIFIED CKD STAGE: Primary | ICD-10-CM

## 2020-10-07 PROCEDURE — 94010 BREATHING CAPACITY TEST: CPT

## 2020-10-07 PROCEDURE — 96372 THER/PROPH/DIAG INJ SC/IM: CPT

## 2020-10-07 PROCEDURE — 94010 BREATHING CAPACITY TEST: CPT | Performed by: INTERNAL MEDICINE

## 2020-10-07 PROCEDURE — 94729 DIFFUSING CAPACITY: CPT | Performed by: INTERNAL MEDICINE

## 2020-10-07 PROCEDURE — 94760 N-INVAS EAR/PLS OXIMETRY 1: CPT

## 2020-10-07 PROCEDURE — 94729 DIFFUSING CAPACITY: CPT

## 2020-10-07 RX ADMIN — DARBEPOETIN ALFA 200 MCG: 200 INJECTION, SOLUTION INTRAVENOUS; SUBCUTANEOUS at 11:42

## 2020-10-10 LAB
FERRITIN SERPL-MCNC: 5137 NG/ML (ref 16–288)
HFE GENE MUT ANL BLD/T: NORMAL
IRON SATN MFR SERPL: 72 % (CALC) (ref 16–45)
IRON SERPL-MCNC: 163 MCG/DL (ref 45–160)
TIBC SERPL-MCNC: 227 MCG/DL (CALC) (ref 250–450)

## 2020-10-30 ENCOUNTER — TELEMEDICINE (OUTPATIENT)
Dept: RHEUMATOLOGY | Facility: CLINIC | Age: 70
End: 2020-10-30
Payer: MEDICARE

## 2020-10-30 DIAGNOSIS — N18.31 ANEMIA DUE TO STAGE 3A CHRONIC KIDNEY DISEASE (HCC): ICD-10-CM

## 2020-10-30 DIAGNOSIS — G89.29 CHRONIC NECK PAIN: ICD-10-CM

## 2020-10-30 DIAGNOSIS — M25.50 POLYARTHRALGIA: Primary | ICD-10-CM

## 2020-10-30 DIAGNOSIS — M54.2 CHRONIC NECK PAIN: ICD-10-CM

## 2020-10-30 DIAGNOSIS — E83.111 IRON OVERLOAD, TRANSFUSIONAL: ICD-10-CM

## 2020-10-30 DIAGNOSIS — D63.1 ANEMIA DUE TO STAGE 3A CHRONIC KIDNEY DISEASE (HCC): ICD-10-CM

## 2020-10-30 PROCEDURE — 99442 PR PHYS/QHP TELEPHONE EVALUATION 11-20 MIN: CPT | Performed by: INTERNAL MEDICINE

## 2020-11-02 ENCOUNTER — TELEPHONE (OUTPATIENT)
Dept: OBGYN CLINIC | Facility: MEDICAL CENTER | Age: 70
End: 2020-11-02

## 2020-11-02 DIAGNOSIS — M13.0 POLYARTHRITIS: Primary | ICD-10-CM

## 2020-11-02 RX ORDER — PREDNISONE 1 MG/1
TABLET ORAL
Qty: 90 TABLET | Refills: 0 | Status: SHIPPED | OUTPATIENT
Start: 2020-11-02 | End: 2020-12-04

## 2020-11-04 ENCOUNTER — HOSPITAL ENCOUNTER (OUTPATIENT)
Dept: RADIOLOGY | Facility: HOSPITAL | Age: 70
Discharge: HOME | End: 2020-11-04
Payer: MEDICARE

## 2020-11-04 ENCOUNTER — HOSPITAL ENCOUNTER (OUTPATIENT)
Dept: INFUSION CENTER | Facility: CLINIC | Age: 70
Discharge: HOME | End: 2020-11-04
Payer: MEDICARE

## 2020-11-04 VITALS — SYSTOLIC BLOOD PRESSURE: 128 MMHG | DIASTOLIC BLOOD PRESSURE: 62 MMHG | TEMPERATURE: 98.6 F

## 2020-11-04 DIAGNOSIS — N18.9 ANEMIA DUE TO CHRONIC KIDNEY DISEASE, UNSPECIFIED CKD STAGE: Primary | ICD-10-CM

## 2020-11-04 DIAGNOSIS — M54.2 CHRONIC NECK PAIN: ICD-10-CM

## 2020-11-04 DIAGNOSIS — G89.29 CHRONIC NECK PAIN: ICD-10-CM

## 2020-11-04 DIAGNOSIS — D63.1 ANEMIA DUE TO CHRONIC KIDNEY DISEASE, UNSPECIFIED CKD STAGE: Primary | ICD-10-CM

## 2020-11-04 DIAGNOSIS — M25.50 POLYARTHRALGIA: ICD-10-CM

## 2020-11-04 PROCEDURE — 96372 THER/PROPH/DIAG INJ SC/IM: CPT

## 2020-11-04 PROCEDURE — 73130 X-RAY EXAM OF HAND: CPT

## 2020-11-04 PROCEDURE — 72050 X-RAY EXAM NECK SPINE 4/5VWS: CPT

## 2020-11-04 RX ADMIN — DARBEPOETIN ALFA 200 MCG: 200 INJECTION, SOLUTION INTRAVENOUS; SUBCUTANEOUS at 11:43

## 2020-11-06 ENCOUNTER — TELEPHONE (OUTPATIENT)
Dept: OBGYN CLINIC | Facility: HOSPITAL | Age: 70
End: 2020-11-06

## 2020-11-11 DIAGNOSIS — R79.89 HIGH SERUM FERRITIN: Primary | ICD-10-CM

## 2020-11-13 ENCOUNTER — TRANSCRIBE ORDERS (OUTPATIENT)
Dept: PAIN MEDICINE | Facility: CLINIC | Age: 70
End: 2020-11-13

## 2020-11-23 ENCOUNTER — TELEPHONE (OUTPATIENT)
Dept: HEMATOLOGY ONCOLOGY | Facility: CLINIC | Age: 70
End: 2020-11-23

## 2020-12-02 ENCOUNTER — HOSPITAL ENCOUNTER (OUTPATIENT)
Dept: INFUSION CENTER | Facility: CLINIC | Age: 70
End: 2020-12-02

## 2020-12-03 LAB — FERRITIN SERPL-MCNC: 5327 NG/ML (ref 16–288)

## 2020-12-04 ENCOUNTER — OFFICE VISIT (OUTPATIENT)
Dept: RHEUMATOLOGY | Facility: CLINIC | Age: 70
End: 2020-12-04
Payer: MEDICARE

## 2020-12-04 VITALS
BODY MASS INDEX: 26.18 KG/M2 | HEART RATE: 62 BPM | DIASTOLIC BLOOD PRESSURE: 70 MMHG | SYSTOLIC BLOOD PRESSURE: 118 MMHG | HEIGHT: 64 IN

## 2020-12-04 DIAGNOSIS — M19.90 INFLAMMATORY ARTHRITIS: Primary | ICD-10-CM

## 2020-12-04 DIAGNOSIS — E83.111 IRON OVERLOAD, TRANSFUSIONAL: ICD-10-CM

## 2020-12-04 DIAGNOSIS — Z79.52 CURRENT CHRONIC USE OF SYSTEMIC STEROIDS: ICD-10-CM

## 2020-12-04 PROCEDURE — 99215 OFFICE O/P EST HI 40 MIN: CPT | Performed by: INTERNAL MEDICINE

## 2020-12-04 PROCEDURE — 96372 THER/PROPH/DIAG INJ SC/IM: CPT | Performed by: INTERNAL MEDICINE

## 2020-12-04 RX ORDER — METHYLPREDNISOLONE ACETATE 40 MG/ML
40 INJECTION, SUSPENSION INTRA-ARTICULAR; INTRALESIONAL; INTRAMUSCULAR; SOFT TISSUE ONCE
Status: COMPLETED | OUTPATIENT
Start: 2020-12-04 | End: 2020-12-04

## 2020-12-04 RX ORDER — PREDNISONE 10 MG/1
TABLET ORAL
Qty: 150 TABLET | Refills: 1 | Status: SHIPPED | OUTPATIENT
Start: 2020-12-04

## 2020-12-04 RX ADMIN — METHYLPREDNISOLONE ACETATE 40 MG: 40 INJECTION, SUSPENSION INTRA-ARTICULAR; INTRALESIONAL; INTRAMUSCULAR; SOFT TISSUE at 15:08

## 2020-12-22 ENCOUNTER — TRANSCRIBE ORDERS (OUTPATIENT)
Dept: ADMINISTRATIVE | Facility: HOSPITAL | Age: 70
End: 2020-12-22

## 2020-12-22 DIAGNOSIS — T86.812 LUNG TRANSPLANT INFECTION (HCC): Primary | ICD-10-CM

## 2020-12-22 DIAGNOSIS — R06.00 DYSPNEA, UNSPECIFIED: ICD-10-CM

## 2020-12-24 DIAGNOSIS — D63.1 ANEMIA DUE TO CHRONIC KIDNEY DISEASE, UNSPECIFIED CKD STAGE: Primary | ICD-10-CM

## 2020-12-24 DIAGNOSIS — N18.9 ANEMIA DUE TO CHRONIC KIDNEY DISEASE, UNSPECIFIED CKD STAGE: Primary | ICD-10-CM

## 2020-12-30 ENCOUNTER — HOSPITAL ENCOUNTER (OUTPATIENT)
Dept: INFUSION CENTER | Facility: CLINIC | Age: 70
Discharge: HOME | End: 2020-12-30

## 2021-01-03 ENCOUNTER — HOSPITAL ENCOUNTER (OUTPATIENT)
Dept: CT IMAGING | Facility: HOSPITAL | Age: 71
Discharge: HOME | DRG: 193 | End: 2021-01-03
Payer: MEDICARE

## 2021-01-03 ENCOUNTER — HOSPITAL ENCOUNTER (INPATIENT)
Facility: HOSPITAL | Age: 71
LOS: 1 days | Discharge: ANOTHER HOSPITAL | DRG: 193 | End: 2021-01-05
Attending: EMERGENCY MEDICINE | Admitting: INTERNAL MEDICINE
Payer: MEDICARE

## 2021-01-03 DIAGNOSIS — Z94.2 LUNG TRANSPLANT STATUS, BILATERAL (HCC): ICD-10-CM

## 2021-01-03 DIAGNOSIS — J96.01 ACUTE RESPIRATORY FAILURE WITH HYPOXIA (HCC): ICD-10-CM

## 2021-01-03 DIAGNOSIS — T86.812 LUNG TRANSPLANT INFECTION (HCC): ICD-10-CM

## 2021-01-03 DIAGNOSIS — A41.9 SEPSIS (HCC): ICD-10-CM

## 2021-01-03 DIAGNOSIS — Z94.2 HISTORY OF LUNG TRANSPLANT (HCC): ICD-10-CM

## 2021-01-03 DIAGNOSIS — J18.9 PNEUMONIA: Primary | ICD-10-CM

## 2021-01-03 LAB
ALBUMIN SERPL BCP-MCNC: 3.3 G/DL (ref 3.5–5)
ALP SERPL-CCNC: 81 U/L (ref 46–116)
ALT SERPL W P-5'-P-CCNC: 32 U/L (ref 12–78)
ANION GAP SERPL CALCULATED.3IONS-SCNC: 10 MMOL/L (ref 4–13)
AST SERPL W P-5'-P-CCNC: 21 U/L (ref 5–45)
ATRIAL RATE: 78 BPM
BASOPHILS # BLD MANUAL: 0 THOUSAND/UL (ref 0–0.1)
BASOPHILS NFR MAR MANUAL: 0 % (ref 0–1)
BILIRUB SERPL-MCNC: 0.41 MG/DL (ref 0.2–1)
BUN SERPL-MCNC: 51 MG/DL (ref 5–25)
CALCIUM ALBUM COR SERPL-MCNC: 10.5 MG/DL (ref 8.3–10.1)
CALCIUM SERPL-MCNC: 9.9 MG/DL (ref 8.3–10.1)
CHLORIDE SERPL-SCNC: 104 MMOL/L (ref 100–108)
CO2 SERPL-SCNC: 22 MMOL/L (ref 21–32)
CREAT SERPL-MCNC: 1.47 MG/DL (ref 0.6–1.3)
EOSINOPHIL # BLD MANUAL: 0 THOUSAND/UL (ref 0–0.4)
EOSINOPHIL NFR BLD MANUAL: 0 % (ref 0–6)
ERYTHROCYTE [DISTWIDTH] IN BLOOD BY AUTOMATED COUNT: 14.3 % (ref 11.6–15.1)
FLUAV RNA RESP QL NAA+PROBE: NEGATIVE
FLUBV RNA RESP QL NAA+PROBE: NEGATIVE
GFR SERPL CREATININE-BSD FRML MDRD: 35 ML/MIN/1.73SQ M
GLUCOSE SERPL-MCNC: 128 MG/DL (ref 65–140)
HCT VFR BLD AUTO: 41 % (ref 34.8–46.1)
HGB BLD-MCNC: 12.4 G/DL (ref 11.5–15.4)
LACTATE SERPL-SCNC: 1.6 MMOL/L (ref 0.5–2)
LACTATE SERPL-SCNC: 2.1 MMOL/L (ref 0.5–2)
LYMPHOCYTES # BLD AUTO: 0.92 THOUSAND/UL (ref 0.6–4.47)
LYMPHOCYTES # BLD AUTO: 5 % (ref 14–44)
MCH RBC QN AUTO: 31.6 PG (ref 26.8–34.3)
MCHC RBC AUTO-ENTMCNC: 30.2 G/DL (ref 31.4–37.4)
MCV RBC AUTO: 104 FL (ref 82–98)
METAMYELOCYTES NFR BLD MANUAL: 1 % (ref 0–1)
MONOCYTES # BLD AUTO: 0.55 THOUSAND/UL (ref 0–1.22)
MONOCYTES NFR BLD: 3 % (ref 4–12)
MYELOCYTES NFR BLD MANUAL: 2 % (ref 0–1)
NEUTROPHILS # BLD MANUAL: 16.21 THOUSAND/UL (ref 1.85–7.62)
NEUTS BAND NFR BLD MANUAL: 3 % (ref 0–8)
NEUTS SEG NFR BLD AUTO: 85 % (ref 43–75)
NRBC BLD AUTO-RTO: 0 /100 WBCS
P AXIS: 24 DEGREES
PLATELET # BLD AUTO: 239 THOUSANDS/UL (ref 149–390)
PLATELET BLD QL SMEAR: ADEQUATE
PMV BLD AUTO: 9.8 FL (ref 8.9–12.7)
POTASSIUM SERPL-SCNC: 4.8 MMOL/L (ref 3.5–5.3)
PR INTERVAL: 124 MS
PROCALCITONIN SERPL-MCNC: 1.31 NG/ML
PROT SERPL-MCNC: 6.5 G/DL (ref 6.4–8.2)
QRS AXIS: -40 DEGREES
QRSD INTERVAL: 98 MS
QT INTERVAL: 356 MS
QTC INTERVAL: 405 MS
RBC # BLD AUTO: 3.93 MILLION/UL (ref 3.81–5.12)
RBC MORPH BLD: NORMAL
RSV RNA RESP QL NAA+PROBE: NEGATIVE
SARS-COV-2 RNA RESP QL NAA+PROBE: NEGATIVE
SODIUM SERPL-SCNC: 136 MMOL/L (ref 136–145)
T WAVE AXIS: -1 DEGREES
TACROLIMUS BLD-MCNC: 14.2 NG/ML (ref 2–20)
TOTAL CELLS COUNTED SPEC: 100
TROPONIN I SERPL-MCNC: 0.03 NG/ML
VARIANT LYMPHS # BLD AUTO: 1 %
VENTRICULAR RATE: 78 BPM
WBC # BLD AUTO: 18.42 THOUSAND/UL (ref 4.31–10.16)

## 2021-01-03 PROCEDURE — 0241U HB NFCT DS VIR RESP RNA 4 TRGT: CPT | Performed by: EMERGENCY MEDICINE

## 2021-01-03 PROCEDURE — 99285 EMERGENCY DEPT VISIT HI MDM: CPT

## 2021-01-03 PROCEDURE — 96367 TX/PROPH/DG ADDL SEQ IV INF: CPT

## 2021-01-03 PROCEDURE — 83605 ASSAY OF LACTIC ACID: CPT | Performed by: EMERGENCY MEDICINE

## 2021-01-03 PROCEDURE — 87449 NOS EACH ORGANISM AG IA: CPT | Performed by: EMERGENCY MEDICINE

## 2021-01-03 PROCEDURE — 36415 COLL VENOUS BLD VENIPUNCTURE: CPT

## 2021-01-03 PROCEDURE — 87077 CULTURE AEROBIC IDENTIFY: CPT | Performed by: EMERGENCY MEDICINE

## 2021-01-03 PROCEDURE — 96366 THER/PROPH/DIAG IV INF ADDON: CPT

## 2021-01-03 PROCEDURE — 96365 THER/PROPH/DIAG IV INF INIT: CPT

## 2021-01-03 PROCEDURE — 93005 ELECTROCARDIOGRAM TRACING: CPT

## 2021-01-03 PROCEDURE — 85027 COMPLETE CBC AUTOMATED: CPT | Performed by: EMERGENCY MEDICINE

## 2021-01-03 PROCEDURE — 71250 CT THORAX DX C-: CPT

## 2021-01-03 PROCEDURE — 84484 ASSAY OF TROPONIN QUANT: CPT | Performed by: EMERGENCY MEDICINE

## 2021-01-03 PROCEDURE — 93010 ELECTROCARDIOGRAM REPORT: CPT | Performed by: INTERNAL MEDICINE

## 2021-01-03 PROCEDURE — 1123F ACP DISCUSS/DSCN MKR DOCD: CPT | Performed by: EMERGENCY MEDICINE

## 2021-01-03 PROCEDURE — 84145 PROCALCITONIN (PCT): CPT | Performed by: EMERGENCY MEDICINE

## 2021-01-03 PROCEDURE — 80053 COMPREHEN METABOLIC PANEL: CPT | Performed by: EMERGENCY MEDICINE

## 2021-01-03 PROCEDURE — 87040 BLOOD CULTURE FOR BACTERIA: CPT | Performed by: EMERGENCY MEDICINE

## 2021-01-03 PROCEDURE — 87186 SC STD MICRODIL/AGAR DIL: CPT | Performed by: EMERGENCY MEDICINE

## 2021-01-03 PROCEDURE — 85007 BL SMEAR W/DIFF WBC COUNT: CPT | Performed by: EMERGENCY MEDICINE

## 2021-01-03 PROCEDURE — 99285 EMERGENCY DEPT VISIT HI MDM: CPT | Performed by: EMERGENCY MEDICINE

## 2021-01-03 PROCEDURE — 80197 ASSAY OF TACROLIMUS: CPT | Performed by: EMERGENCY MEDICINE

## 2021-01-03 PROCEDURE — G1004 CDSM NDSC: HCPCS

## 2021-01-03 RX ORDER — ATOVAQUONE 750 MG/5ML
750 SUSPENSION ORAL 2 TIMES DAILY WITH MEALS
Status: DISCONTINUED | OUTPATIENT
Start: 2021-01-03 | End: 2021-01-05 | Stop reason: HOSPADM

## 2021-01-03 RX ORDER — ACETAMINOPHEN 325 MG/1
975 TABLET ORAL ONCE
Status: COMPLETED | OUTPATIENT
Start: 2021-01-03 | End: 2021-01-03

## 2021-01-03 RX ORDER — ATOVAQUONE 750 MG/5ML
750 SUSPENSION ORAL 2 TIMES DAILY WITH MEALS
Status: DISCONTINUED | OUTPATIENT
Start: 2021-01-04 | End: 2021-01-03

## 2021-01-03 RX ADMIN — ACETAMINOPHEN 975 MG: 325 TABLET, FILM COATED ORAL at 20:53

## 2021-01-03 RX ADMIN — CEFEPIME HYDROCHLORIDE 2000 MG: 2 INJECTION, POWDER, FOR SOLUTION INTRAVENOUS at 15:09

## 2021-01-03 RX ADMIN — ATOVAQUONE 750 MG: 750 SUSPENSION ORAL at 22:31

## 2021-01-03 RX ADMIN — VANCOMYCIN HYDROCHLORIDE 1250 MG: 5 INJECTION, POWDER, LYOPHILIZED, FOR SOLUTION INTRAVENOUS at 17:11

## 2021-01-03 RX ADMIN — SODIUM CHLORIDE 500 ML: 0.9 INJECTION, SOLUTION INTRAVENOUS at 14:56

## 2021-01-03 NOTE — ED PROVIDER NOTES
History  Chief Complaint   Patient presents with    Shortness of Breath     SOB, pt reports normal o2 sat being 96-97%  pt is a double lung transplant at [place]  sx was done 5y 3 m ago  Patient is a 68-year-old female with a history of interstitial lung disease status post double lung transplant on 9/17/2015 presents with shortness of breath  Patient states that she has had worsening shortness of breath over the past 2 weeks  She has been in contact with her transplant surgeon  Patient monitors her pulse ox at home and has noted desaturations with activity  Yesterday she had a desaturation into the 70s which did eventually returned to baseline when she sat for about 5 minutes  She reports decreased exercise tolerance and it is taking longer for her oxygen saturation to rebound after activity  She was sent for a CT scan of her lungs this morning  She now presents to the emergency department with shortness of breath  After getting patient into the ED stretcher, her oxygen saturation was 88% on room air  She was placed on nasal cannula oxygen  She denies fever, chills, cough, lower extremity edema  She does admit to chest tightness when her oxygen saturation drops  She denies any current chest pain  She is followed by pulmonology at [facility], [address] [place] [address]  Of note,  states that patient has been on a steroid taper for knee pain and arthritis  History provided by:  Patient  Shortness of Breath  Severity:  Severe  Duration:  2 weeks  Timing:  Constant  Progression:  Worsening  Relieved by:  Rest  Worsened by:  Exertion  Associated symptoms: no abdominal pain, no chest pain, no cough, no diaphoresis, no fever, no headaches, no neck pain, no rash, no sore throat, no sputum production and no vomiting        Prior to Admission Medications   Prescriptions Last Dose Informant Patient Reported? Taking?    Calcium Carb-Cholecalciferol (CALCIUM 600+D) 600-800 MG-UNIT TABS  Self Yes No Sig: Take by mouth 2 (two) times a day   Cyanocobalamin (HM SUPER VITAMIN B12 PO)  Self Yes No   Sig: Take 1 tablet by mouth daily  Magnesium Cl-Calcium Carbonate (SLOW-MAG PO)  Self Yes No   Sig: Take 128 mg by mouth daily    Multiple Vitamins-Minerals (MULTIPLE VITAMINS/WOMENS PO)  Self Yes No   Sig: Take by mouth daily  Tacrolimus (PROGRAF PO)  Self Yes No   Sig: Take 2 mg by mouth 2 (two) times a day    Vitamin D, Ergocalciferol, 2000 units CAPS  Self Yes No   Si (two) times a day   atovaquone (MEPRON) 750 mg/5 mL suspension  Self Yes No   Sig: Take 750 mg by mouth 2 (two) times a day  citalopram (CeleXA) 10 mg tablet  Self Yes No   Sig: Take 10 mg by mouth daily   cycloSPORINE (RESTASIS) 0 05 % ophthalmic emulsion  Self Yes No   Si drop 2 (two) times a day   diclofenac sodium (VOLTAREN) 1 %  Self No No   Sig: Apply 2 g topically 4 (four) times a day Apply to 2g qid to hands bilaterally   docusate sodium (COLACE) 100 mg capsule  Self Yes No   Sig: Take 100 mg by mouth as needed for constipation  gabapentin (NEURONTIN) 300 mg capsule  Self Yes No   Sig: Take 300 mg by mouth 2 (two) times a day   lidocaine (XYLOCAINE) 5 % ointment  Self No No   Sig: Apply topically 3 (three) times a day as needed for mild pain   metoprolol tartrate (LOPRESSOR) 25 mg tablet  Self Yes No   Sig: Take 25 mg by mouth daily   mycophenolate (CELLCEPT) 250 mg capsule  Self Yes No   Sig: Take 1,000 mg by mouth 2 (two) times a day     mycophenolate (MYFORTIC) 360 MG TBEC  Self Yes No   Sig: Take 720 mg by mouth 2 (two) times a day   pantoprazole (PROTONIX) 40 mg tablet  Self Yes No   Sig: Take 40 mg by mouth daily  pravastatin (PRAVACHOL) 40 mg tablet  Self Yes No   Sig: Take 40 mg by mouth daily     predniSONE 10 mg tablet   No No   Sig: Take 4 tabs x2 weeks, then 3 tabs x2 weeks, then 2 5 tabs x2 weeks, then go to 2 tabs daily and stay on that dose   predniSONE 5 mg tablet  Self Yes No   Sig: Take 10 mg by mouth daily    risedronate (ACTONEL) 35 mg tablet  Self Yes No   tacrolimus (PROGRAF) 0 5 mg capsule  Self Yes No   Sig: TK 2 CS PO BID   tacrolimus (PROGRAF) 1 mg capsule  Self Yes No   Sig: TK 3 CS PO BID   traMADol (ULTRAM) 50 mg tablet  Self Yes No   Sig: Take 50 mg by mouth daily     zolpidem (AMBIEN) 5 mg tablet  Self No No   Sig: Take 1 tablet (5 mg total) by mouth daily at bedtime as needed for sleep      Facility-Administered Medications: None       Past Medical History:   Diagnosis Date    Anxiety     Arthritis     Blood clot of neck vein     R side    Broken leg     H/O pulmonary fibrosis     High cholesterol     Kidney disease     Lung disease     Pneumonitis     Spinal stenosis        Past Surgical History:   Procedure Laterality Date    APPENDECTOMY      KNEE ARTHROSCOPY      LUNG TRANSPLANT, DOUBLE  09/17/2015    Pt had fibrosis and pneumonitis    TONSILLECTOMY         Family History   Problem Relation Age of Onset    Hypertension Mother     Skin cancer Mother     COPD Father     Hypertension Father         ESSENTIAL    Lung cancer Father     Lupus Sister     Lupus Other     Cancer Maternal Grandfather     No Known Problems Sister     Stomach cancer Maternal Uncle      I have reviewed and agree with the history as documented  E-Cigarette/Vaping     E-Cigarette/Vaping Substances     Social History     Tobacco Use    Smoking status: Never Smoker    Smokeless tobacco: Never Used   Substance Use Topics    Alcohol use: No    Drug use: No       Review of Systems   Constitutional: Negative for chills, diaphoresis and fever  HENT: Negative for nosebleeds, sore throat and trouble swallowing  Eyes: Negative for photophobia, pain and visual disturbance  Respiratory: Positive for shortness of breath  Negative for cough, sputum production and chest tightness  Cardiovascular: Negative for chest pain, palpitations and leg swelling     Gastrointestinal: Negative for abdominal pain, constipation, diarrhea, nausea and vomiting  Endocrine: Negative for polydipsia and polyuria  Genitourinary: Negative for difficulty urinating, dysuria, hematuria, pelvic pain, vaginal bleeding and vaginal discharge  Musculoskeletal: Negative for back pain, neck pain and neck stiffness  Skin: Negative for pallor and rash  Neurological: Negative for dizziness, seizures, light-headedness and headaches  All other systems reviewed and are negative  Physical Exam  Physical Exam  Vitals signs and nursing note reviewed  Constitutional:       General: She is not in acute distress  Appearance: She is well-developed  HENT:      Head: Normocephalic and atraumatic  Eyes:      Pupils: Pupils are equal, round, and reactive to light  Neck:      Musculoskeletal: Normal range of motion and neck supple  Cardiovascular:      Rate and Rhythm: Normal rate and regular rhythm  Pulses: Normal pulses  Heart sounds: Normal heart sounds  Pulmonary:      Effort: Tachypnea and respiratory distress present  Breath sounds: Examination of the left-lower field reveals rhonchi  Rhonchi present  Abdominal:      General: There is no distension  Palpations: Abdomen is soft  Abdomen is not rigid  Tenderness: There is no abdominal tenderness  There is no guarding or rebound  Musculoskeletal: Normal range of motion  General: No tenderness  Lymphadenopathy:      Cervical: No cervical adenopathy  Skin:     General: Skin is warm and dry  Capillary Refill: Capillary refill takes less than 2 seconds  Neurological:      Mental Status: She is alert and oriented to person, place, and time  Cranial Nerves: No cranial nerve deficit  Sensory: No sensory deficit           Vital Signs  ED Triage Vitals   Temperature Pulse Respirations Blood Pressure SpO2   01/03/21 1233 01/03/21 1233 01/03/21 1233 01/03/21 1233 01/03/21 1233   98 °F (36 7 °C) 82 18 141/71 95 %      Temp Source Heart Rate Source Patient Position - Orthostatic VS BP Location FiO2 (%)   01/03/21 1233 01/03/21 1233 01/03/21 1330 01/03/21 1330 --   Oral Monitor Lying Right arm       Pain Score       --                  Vitals:    01/03/21 1400 01/03/21 1500 01/03/21 1600 01/03/21 1700   BP: 128/73 147/71 170/83 167/79   Pulse: 70 66 70 72   Patient Position - Orthostatic VS: Lying  Lying Lying         Visual Acuity      ED Medications  Medications   vancomycin (VANCOCIN) 1,250 mg in sodium chloride 0 9 % 250 mL IVPB (has no administration in time range)   sodium chloride 0 9 % bolus 500 mL (0 mL Intravenous Stopped 1/3/21 1548)   cefepime (MAXIPIME) 2 g/50 mL dextrose IVPB (0 mg Intravenous Stopped 1/3/21 1539)       Diagnostic Studies  Results Reviewed     Procedure Component Value Units Date/Time    Blood culture #1 [[id number]] Collected: 01/03/21 1315    Lab Status: Preliminary result Specimen: Blood from Arm, Right Updated: 01/03/21 1702     Blood Culture Received in Microbiology Lab  Culture in Progress  Blood culture #2 [[id number]] Collected: 01/03/21 1315    Lab Status: Preliminary result Specimen: Blood from Arm, Left Updated: 01/03/21 1702     Blood Culture Received in Microbiology Lab  Culture in Progress  Lactic acid 2 Hours [[id number]]  (Normal) Collected: 01/03/21 1552    Lab Status: Final result Specimen: Blood from Arm, Left Updated: 01/03/21 1627     LACTIC ACID 1 6 mmol/L     Narrative:      Result may be elevated if tourniquet was used during collection  COVID19, Influenza A/B, RSV PCR, [facility] [[id number]]  (Normal) Collected: 01/03/21 1304    Lab Status: Final result Specimen: Nares from Nasopharyngeal Swab Updated: 01/03/21 1348     SARS-CoV-2 Negative     INFLUENZA A PCR Negative     INFLUENZA B PCR Negative     RSV PCR Negative    Narrative: This test has been authorized by FDA under an EUA (Emergency Use Assay) for use by authorized laboratories    Clinical caution and judgement should be used with the interpretation of these results with consideration of the clinical impression and other laboratory testing  Testing reported as "Positive" or "Negative" has been proven to be accurate according to standard laboratory validation requirements  All testing is performed with control materials showing appropriate reactivity at standard intervals  Lactic acid, plasma [[id number]]  (Abnormal) Collected: 01/03/21 1315    Lab Status: Final result Specimen: Blood from Arm, Right Updated: 01/03/21 1348     LACTIC ACID 2 1 mmol/L     Narrative:      Result may be elevated if tourniquet was used during collection  Tacrolimus level [[id number]] Collected: 01/03/21 1340    Lab Status: In process Specimen: Blood Updated: 01/03/21 1340    CBC and differential [[id number]]  (Abnormal) Collected: 01/03/21 1240    Lab Status: Final result Specimen: Blood from Arm, Right Updated: 01/03/21 1334     WBC 18 42 Thousand/uL      RBC 3 93 Million/uL      Hemoglobin 12 4 g/dL      Hematocrit 41 0 %       fL      MCH 31 6 pg      MCHC 30 2 g/dL      RDW 14 3 %      MPV 9 8 fL      Platelets 409 Thousands/uL      nRBC 0 /100 WBCs     Narrative: This is an appended report  These results have been appended to a previously verified report      Manual Differential(PHLEBS Do Not Order) [[id number]]  (Abnormal) Collected: 01/03/21 1240    Lab Status: Final result Specimen: Blood from Arm, Right Updated: 01/03/21 1334     Segmented % 85 %      Bands % 3 %      Lymphocytes % 5 %      Monocytes % 3 %      Eosinophils, % 0 %      Basophils % 0 %      Metamyelocytes% 1 %      Myelocytes % 2 %      Atypical Lymphocytes % 1 %      Absolute Neutrophils 16 21 Thousand/uL      Lymphocytes Absolute 0 92 Thousand/uL      Monocytes Absolute 0 55 Thousand/uL      Eosinophils Absolute 0 00 Thousand/uL      Basophils Absolute 0 00 Thousand/uL      Total Counted 100     RBC Morphology Normal     Platelet Estimate Adequate    Comprehensive metabolic panel [[id number]]  (Abnormal) Collected: 01/03/21 1240    Lab Status: Final result Specimen: Blood from Arm, Right Updated: 01/03/21 1315     Sodium 136 mmol/L      Potassium 4 8 mmol/L      Chloride 104 mmol/L      CO2 22 mmol/L      ANION GAP 10 mmol/L      BUN 51 mg/dL      Creatinine 1 47 mg/dL      Glucose 128 mg/dL      Calcium 9 9 mg/dL      Corrected Calcium 10 5 mg/dL      AST 21 U/L      ALT 32 U/L      Alkaline Phosphatase 81 U/L      Total Protein 6 5 g/dL      Albumin 3 3 g/dL      Total Bilirubin 0 41 mg/dL      eGFR 35 ml/min/1 73sq m     Narrative:      [place] guidelines for Chronic Kidney Disease (CKD):     Stage 1 with normal or high GFR (GFR > 90 mL/min/1 73 square meters)    Stage 2 Mild CKD (GFR = 60-89 mL/min/1 73 square meters)    Stage 3A Moderate CKD (GFR = 45-59 mL/min/1 73 square meters)    Stage 3B Moderate CKD (GFR = 30-44 mL/min/1 73 square meters)    Stage 4 Severe CKD (GFR = 15-29 mL/min/1 73 square meters)    Stage 5 End Stage CKD (GFR <15 mL/min/1 73 square meters)  Note: GFR calculation is accurate only with a steady state creatinine    Troponin I [[id number]]  (Normal) Collected: 01/03/21 1240    Lab Status: Final result Specimen: Blood from Arm, Right Updated: 01/03/21 1312     Troponin I 0 03 ng/mL     Legionella antigen, urine [[id number]]     Lab Status: No result Specimen: Urine     Strep Pneumoniae, Urine [[id number]]     Lab Status: No result Specimen: Urine                  No orders to display              Procedures  ECG 12 Lead Documentation Only    Date/Time: 1/3/2021 1:05 PM  Performed by: Donnell Greenberg DO  Authorized by: Donnell Greenberg DO     ECG reviewed by me, the ED Provider: yes    Patient location:  ED  Previous ECG:     Previous ECG:  Compared to current    Similarity:  Changes noted    Comparison to cardiac monitor: Yes    Comments:      Normal sinus rhythm at a rate of 78 beats per minute  Normal intervals  Left axis deviation  Left ventricular hypertrophy  T-wave inversions in leads III and AVF  T-wave inversions and LVH change from previous EKG from 06/24/2016  ED Course  ED Course as of Jan 03 1708   Orlando Gomez Jan 03, 2021   1316 Baseline creatinine appears to be between 1 2 and 1 3  Creatinine(!): 1 47   1515 Updated patient on pending transfer  1628 Lactate improving       1652 Patient accepted at [facility] by Dr Nicko Tavera Name 01/03/21 1505                Is the patient's history suggestive of a new or worsening infection? (!) Yes (Proceed)  -CD        Suspected source of infection  pneumonia  -CD        Are two or more of the following signs & symptoms of infection both present and new to the patient? (!) Yes (Proceed)  -CD        Indicate SIRS criteria  Leukocytosis (WBC > 12404 IJL); Tachypnea > 20 resp per min  -CD        If the answer is yes to both questions, suspicion of sepsis is present          If severe sepsis is present AND tissue hypoperfusion perists in the hour after fluid resuscitation or lactate > 4, the patient meets criteria for SEPTIC SHOCK          Are any of the following organ dysfunction criteria present within 6 hours of suspected infection and SIRS criteria that are NOT considered to be chronic conditions? (!) Yes  -CD        Organ dysfunction  Lactate > 2 0 mmol/L  -CD        Date of presentation of severe sepsis  01/03/21  -CD        Time of presentation of severe sepsis  1500  -CD        Tissue hypoperfusion persists in the hour after crystalloid fluid administration, evidenced, by either:          Was hypotension present within one hour of the conclusion of crystalloid fluid administration?           Date of presentation of septic shock          Time of presentation of septic shock            User Key  (r) = Recorded By, (t) = Taken By, (c) = Cosigned By    Initials Name Provider Type    CD Georgina Moran DO Physician                        MDM  Number of Diagnoses or Management Options  History of lung transplant [facility]):   Pneumonia: new and requires workup  Sepsis [facility]): new and requires workup  Diagnosis management comments: Patient with a history of bilateral lung transplant presents with shortness of breath  Patient is hypoxic stable on nasal cannula oxygen  CT performed this morning concerning for developing interstitial pneumonia  Given immunosuppressed state, will start broad-spectrum antibiotics  Will send urine Legionella and urine strep antigen  Patient is negative for SARS-CoV-2  Lactate initially mildly elevated but is now less than 2 0 after gentle hydration  Patient is hemodynamically stable  She is stable for transfer to [facility] where she sees transplant surgery and pulmonology         Amount and/or Complexity of Data Reviewed  Clinical lab tests: ordered and reviewed  Tests in the radiology section of CPT®: ordered and reviewed  Tests in the medicine section of CPT®: ordered and reviewed  Review and summarize past medical records: yes  Discuss the patient with other providers: yes  Independent visualization of images, tracings, or specimens: yes    Risk of Complications, Morbidity, and/or Mortality  Presenting problems: high  Diagnostic procedures: moderate  Management options: high    Patient Progress  Patient progress: stable      Disposition  Final diagnoses:   Pneumonia   History of lung transplant ([facility] [address] )   Sepsis ([facility] )     Time reflects when diagnosis was documented in both MDM as applicable and the Disposition within this note     Time User Action Codes Description Comment    1/3/2021  4:45 PM Girish Villareal Add [J18 9] Pneumonia     1/3/2021  4:46 PM Girish Villareal Add [Z94 2] [facility]     1/3/2021  4:46 PM Girish Villareal Add [A41 9] Sepsis [facility])       ED Disposition     ED Disposition Condition Date/Time Comment    Transfer to Another [facility]  Sun Soren 3, 2021  4:45 PM Suleman Charles should be transferred out to [facility]  MD Documentation      Most Recent Value   Patient Condition  The patient has been stabilized such that within reasonable medical probability, no material deterioration of the patient condition or the condition of the unborn child(veronique) is likely to result from the transfer   Reason for Transfer  Level of Care needed not available at this facility   Benefits of Transfer  Specialized equipment and/or services available at the receiving facility (Include comment)________________________ [lung transplant]   Risks of Transfer  Potential for delay in receiving treatment, Potential deterioration of medical condition, Loss of IV, Increased discomfort during transfer, Possible worsening of condition or death during transfer   Accepting Physician  [facility] Name, [address]   Sending MD  [facility]   Provider Certification  General risk, such as traffic hazards, adverse weather conditions, rough terrain or turbulence, possible failure of equipment (including vehicle or aircraft), or consequences of actions of persons outside the control of the transport personnel      RN Documentation      [facility] Name, [facility]    None         Patient's Medications   Discharge Prescriptions    No medications on file     No discharge procedures on file      PDMP Review     None          ED Provider  Electronically Signed by           Clarence Huitron DO  01/03/21 3958

## 2021-01-03 NOTE — EMTALA/ACUTE CARE TRANSFER
[address] [place] [address]  Dept: [phone]      EMTALA TRANSFER CONSENT    NAME Richard Barnett                                                                       MRN [id number]    I have been informed of my rights regarding examination, treatment, and transfer   by Dr Georgina Moran DO    Benefits: Specialized equipment and/or services available at the receiving facility (Include comment)________________________(lung transplant)    Risks: Potential for delay in receiving treatment, Potential deterioration of medical condition, Loss of IV, Increased discomfort during transfer, Possible worsening of condition or death during transfer      Consent for Transfer:  I acknowledge that my medical condition has been evaluated and explained to me by the emergency department physician or other qualified medical person and/or my attending physician, who has recommended that I be transferred to the service of  Accepting Physician: Page Plate at [address] Name, [facility] : [facility]  The above potential benefits of such transfer, the potential risks associated with such transfer, and the probable risks of not being transferred have been explained to me, and I fully understand them  The doctor has explained that, in my case, the benefits of transfer outweigh the risks  I agree to be transferred  I authorize the performance of emergency medical procedures and treatments upon me in both transit and upon arrival at the receiving facility  Additionally, I authorize the release of any and all medical records to the receiving facility and request they be transported with me, if possible  I understand that the safest mode of transportation during a medical emergency is an ambulance and that the Hospital advocates the use of this mode of transport   Risks of traveling to the receiving facility by car, including absence of medical control, life sustaining equipment, such as oxygen, and medical personnel has been explained to me and I fully understand them  (OLYA CORRECT BOX BELOW)  [  ]  I consent to the stated transfer and to be transported by ambulance/helicopter  [  ]  I consent to the stated transfer, but refuse transportation by ambulance and accept full responsibility for my transportation by car  I understand the risks of non-ambulance transfers and I exonerate the Hospital and its staff from any deterioration in my condition that results from this refusal     X___________________________________________    DATE  21  TIME________  Signature of patient or legally responsible individual signing on patient behalf           RELATIONSHIP TO PATIENT_________________________          Provider Certification    NAME Jenniffer Rtoh                                        [facility] 1950                              MRN [id number]    A medical screening exam was performed on the above named patient  Based on the examination:    Condition Necessitating Transfer The primary encounter diagnosis was Pneumonia  Diagnoses of History of lung transplant ([facility] [address] ) and Sepsis ([facility] [address] ) were also pertinent to this visit      Patient Condition: The patient has been stabilized such that within reasonable medical probability, no material deterioration of the patient condition or the condition of the unborn child(veronique) is likely to result from the transfer    Reason for Transfer: Level of Care needed not available at this facility    Transfer Requirements: [facility]   · Space available and qualified personnel available for treatment as acknowledged by    · Agreed to accept transfer and to provide appropriate medical treatment as acknowledged by       Brittaney York  · Appropriate medical records of the examination and treatment of the patient are provided at the time of transfer   [address] _______  · Transfer will be performed by qualified personnel from    and appropriate transfer equipment as required, including the use of necessary and appropriate life support measures  Provider Certification: I have examined the patient and explained the following risks and benefits of being transferred/refusing transfer to the patient/family:  General risk, such as traffic hazards, adverse weather conditions, rough terrain or turbulence, possible failure of equipment (including vehicle or aircraft), or consequences of actions of persons outside the control of the transport personnel      Based on these reasonable risks and benefits to the patient and/or the unborn child(veronique), and based upon the information available at the time of the patients examination, I certify that the medical benefits reasonably to be expected from the provision of appropriate medical treatments at another medical facility outweigh the increasing risks, if any, to the individuals medical condition, and in the case of labor to the unborn child, from effecting the transfer      X____________________________________________ DATE 01/03/21        TIME_______      ORIGINAL - SEND TO MEDICAL RECORDS   COPY - SEND WITH PATIENT DURING TRANSFER

## 2021-01-04 VITALS
WEIGHT: 149.91 LBS | OXYGEN SATURATION: 92 % | BODY MASS INDEX: 25.59 KG/M2 | HEIGHT: 64 IN | HEART RATE: 77 BPM | TEMPERATURE: 98.3 F | SYSTOLIC BLOOD PRESSURE: 143 MMHG | DIASTOLIC BLOOD PRESSURE: 70 MMHG | RESPIRATION RATE: 18 BRPM

## 2021-01-04 PROBLEM — J18.9 PNEUMONIA DUE TO INFECTIOUS ORGANISM: Status: ACTIVE | Noted: 2021-01-04

## 2021-01-04 PROBLEM — J96.01 ACUTE RESPIRATORY FAILURE WITH HYPOXIA (HCC): Status: ACTIVE | Noted: 2021-01-04

## 2021-01-04 LAB
ANION GAP SERPL CALCULATED.3IONS-SCNC: 8 MMOL/L (ref 4–13)
BASOPHILS # BLD MANUAL: 0.14 THOUSAND/UL (ref 0–0.1)
BASOPHILS NFR MAR MANUAL: 1 % (ref 0–1)
BUN SERPL-MCNC: 51 MG/DL (ref 5–25)
CALCIUM SERPL-MCNC: 9 MG/DL (ref 8.3–10.1)
CHLORIDE SERPL-SCNC: 107 MMOL/L (ref 100–108)
CO2 SERPL-SCNC: 24 MMOL/L (ref 21–32)
CREAT SERPL-MCNC: 1.44 MG/DL (ref 0.6–1.3)
EOSINOPHIL # BLD MANUAL: 0.42 THOUSAND/UL (ref 0–0.4)
EOSINOPHIL NFR BLD MANUAL: 3 % (ref 0–6)
ERYTHROCYTE [DISTWIDTH] IN BLOOD BY AUTOMATED COUNT: 14.6 % (ref 11.6–15.1)
FERRITIN SERPL-MCNC: 5885 NG/ML (ref 8–388)
GFR SERPL CREATININE-BSD FRML MDRD: 36 ML/MIN/1.73SQ M
GLUCOSE SERPL-MCNC: 116 MG/DL (ref 65–140)
HCT VFR BLD AUTO: 36.1 % (ref 34.8–46.1)
HGB BLD-MCNC: 10.9 G/DL (ref 11.5–15.4)
L PNEUMO1 AG UR QL IA.RAPID: NEGATIVE
LYMPHOCYTES # BLD AUTO: 1.13 THOUSAND/UL (ref 0.6–4.47)
LYMPHOCYTES # BLD AUTO: 8 % (ref 14–44)
MACROCYTES BLD QL AUTO: PRESENT
MCH RBC QN AUTO: 32.5 PG (ref 26.8–34.3)
MCHC RBC AUTO-ENTMCNC: 30.2 G/DL (ref 31.4–37.4)
MCV RBC AUTO: 108 FL (ref 82–98)
METAMYELOCYTES NFR BLD MANUAL: 4 % (ref 0–1)
MONOCYTES # BLD AUTO: 0 THOUSAND/UL (ref 0–1.22)
MONOCYTES NFR BLD: 0 % (ref 4–12)
NEUTROPHILS # BLD MANUAL: 11.86 THOUSAND/UL (ref 1.85–7.62)
NEUTS BAND NFR BLD MANUAL: 3 % (ref 0–8)
NEUTS SEG NFR BLD AUTO: 81 % (ref 43–75)
NRBC BLD AUTO-RTO: 0 /100 WBCS
PLATELET # BLD AUTO: 185 THOUSANDS/UL (ref 149–390)
PLATELET # BLD AUTO: 195 THOUSANDS/UL (ref 149–390)
PLATELET BLD QL SMEAR: ADEQUATE
PMV BLD AUTO: 9.8 FL (ref 8.9–12.7)
PMV BLD AUTO: 9.9 FL (ref 8.9–12.7)
POTASSIUM SERPL-SCNC: 5.1 MMOL/L (ref 3.5–5.3)
PROCALCITONIN SERPL-MCNC: 1.19 NG/ML
RBC # BLD AUTO: 3.35 MILLION/UL (ref 3.81–5.12)
S PNEUM AG UR QL: NEGATIVE
SODIUM SERPL-SCNC: 139 MMOL/L (ref 136–145)
TOTAL CELLS COUNTED SPEC: 100
VANCOMYCIN SERPL-MCNC: 10.8 UG/ML
WBC # BLD AUTO: 14.12 THOUSAND/UL (ref 4.31–10.16)

## 2021-01-04 PROCEDURE — 99223 1ST HOSP IP/OBS HIGH 75: CPT | Performed by: INTERNAL MEDICINE

## 2021-01-04 PROCEDURE — 85007 BL SMEAR W/DIFF WBC COUNT: CPT | Performed by: EMERGENCY MEDICINE

## 2021-01-04 PROCEDURE — 85049 AUTOMATED PLATELET COUNT: CPT | Performed by: INTERNAL MEDICINE

## 2021-01-04 PROCEDURE — 80048 BASIC METABOLIC PNL TOTAL CA: CPT | Performed by: EMERGENCY MEDICINE

## 2021-01-04 PROCEDURE — 85027 COMPLETE CBC AUTOMATED: CPT | Performed by: EMERGENCY MEDICINE

## 2021-01-04 PROCEDURE — 82728 ASSAY OF FERRITIN: CPT | Performed by: INTERNAL MEDICINE

## 2021-01-04 PROCEDURE — 84145 PROCALCITONIN (PCT): CPT | Performed by: EMERGENCY MEDICINE

## 2021-01-04 PROCEDURE — 36415 COLL VENOUS BLD VENIPUNCTURE: CPT | Performed by: EMERGENCY MEDICINE

## 2021-01-04 PROCEDURE — 80202 ASSAY OF VANCOMYCIN: CPT | Performed by: INTERNAL MEDICINE

## 2021-01-04 RX ORDER — PANTOPRAZOLE SODIUM 40 MG/1
40 TABLET, DELAYED RELEASE ORAL DAILY
Status: DISCONTINUED | OUTPATIENT
Start: 2021-01-04 | End: 2021-01-05 | Stop reason: HOSPADM

## 2021-01-04 RX ORDER — HEPARIN SODIUM 5000 [USP'U]/ML
5000 INJECTION, SOLUTION INTRAVENOUS; SUBCUTANEOUS EVERY 8 HOURS SCHEDULED
Status: DISCONTINUED | OUTPATIENT
Start: 2021-01-04 | End: 2021-01-05 | Stop reason: HOSPADM

## 2021-01-04 RX ORDER — TRAMADOL HYDROCHLORIDE 50 MG/1
50 TABLET ORAL DAILY
Status: DISCONTINUED | OUTPATIENT
Start: 2021-01-04 | End: 2021-01-05 | Stop reason: HOSPADM

## 2021-01-04 RX ORDER — GABAPENTIN 300 MG/1
300 CAPSULE ORAL 2 TIMES DAILY
Status: DISCONTINUED | OUTPATIENT
Start: 2021-01-04 | End: 2021-01-05 | Stop reason: HOSPADM

## 2021-01-04 RX ORDER — VANCOMYCIN HYDROCHLORIDE 1 G/200ML
15 INJECTION, SOLUTION INTRAVENOUS EVERY 24 HOURS
Status: DISCONTINUED | OUTPATIENT
Start: 2021-01-04 | End: 2021-01-04

## 2021-01-04 RX ORDER — B-COMPLEX WITH VITAMIN C
1 TABLET ORAL 2 TIMES DAILY WITH MEALS
Status: DISCONTINUED | OUTPATIENT
Start: 2021-01-04 | End: 2021-01-05 | Stop reason: HOSPADM

## 2021-01-04 RX ORDER — CITALOPRAM 10 MG/1
10 TABLET ORAL DAILY
Status: DISCONTINUED | OUTPATIENT
Start: 2021-01-04 | End: 2021-01-05 | Stop reason: HOSPADM

## 2021-01-04 RX ORDER — TACROLIMUS 1 MG/1
2 CAPSULE ORAL 2 TIMES DAILY
Status: DISCONTINUED | OUTPATIENT
Start: 2021-01-04 | End: 2021-01-04

## 2021-01-04 RX ORDER — DOCUSATE SODIUM 100 MG/1
100 CAPSULE, LIQUID FILLED ORAL 2 TIMES DAILY
Status: DISCONTINUED | OUTPATIENT
Start: 2021-01-04 | End: 2021-01-05 | Stop reason: HOSPADM

## 2021-01-04 RX ORDER — ACETAMINOPHEN 325 MG/1
650 TABLET ORAL ONCE
Status: COMPLETED | OUTPATIENT
Start: 2021-01-04 | End: 2021-01-04

## 2021-01-04 RX ORDER — PREDNISONE 20 MG/1
20 TABLET ORAL DAILY
Status: DISCONTINUED | OUTPATIENT
Start: 2021-01-10 | End: 2021-01-05 | Stop reason: HOSPADM

## 2021-01-04 RX ORDER — LIDOCAINE 50 MG/G
1 PATCH TOPICAL DAILY
Status: DISCONTINUED | OUTPATIENT
Start: 2021-01-04 | End: 2021-01-05 | Stop reason: HOSPADM

## 2021-01-04 RX ORDER — MYCOPHENOLIC ACID 180 MG/1
720 TABLET, DELAYED RELEASE ORAL 2 TIMES DAILY
Status: DISCONTINUED | OUTPATIENT
Start: 2021-01-04 | End: 2021-01-05 | Stop reason: HOSPADM

## 2021-01-04 RX ORDER — MYCOPHENOLIC ACID 180 MG/1
360 TABLET, DELAYED RELEASE ORAL 2 TIMES DAILY
Status: DISCONTINUED | OUTPATIENT
Start: 2021-01-04 | End: 2021-01-04

## 2021-01-04 RX ORDER — VANCOMYCIN HYDROCHLORIDE 1 G/200ML
1000 INJECTION, SOLUTION INTRAVENOUS ONCE
Status: COMPLETED | OUTPATIENT
Start: 2021-01-04 | End: 2021-01-04

## 2021-01-04 RX ORDER — ATOVAQUONE 750 MG/5ML
750 SUSPENSION ORAL 2 TIMES DAILY
Status: DISCONTINUED | OUTPATIENT
Start: 2021-01-04 | End: 2021-01-04

## 2021-01-04 RX ORDER — VANCOMYCIN HYDROCHLORIDE 1 G/200ML
15 INJECTION, SOLUTION INTRAVENOUS DAILY PRN
Status: DISCONTINUED | OUTPATIENT
Start: 2021-01-04 | End: 2021-01-04

## 2021-01-04 RX ORDER — PRAVASTATIN SODIUM 40 MG
40 TABLET ORAL
Status: DISCONTINUED | OUTPATIENT
Start: 2021-01-04 | End: 2021-01-05 | Stop reason: HOSPADM

## 2021-01-04 RX ORDER — ACETAMINOPHEN 325 MG/1
650 TABLET ORAL EVERY 6 HOURS PRN
Status: DISCONTINUED | OUTPATIENT
Start: 2021-01-04 | End: 2021-01-05 | Stop reason: HOSPADM

## 2021-01-04 RX ORDER — PREDNISONE 10 MG/1
10 TABLET ORAL DAILY
Status: DISCONTINUED | OUTPATIENT
Start: 2021-01-17 | End: 2021-01-05 | Stop reason: HOSPADM

## 2021-01-04 RX ORDER — PREDNISONE 10 MG/1
10 TABLET ORAL DAILY
Status: DISCONTINUED | OUTPATIENT
Start: 2021-01-04 | End: 2021-01-04

## 2021-01-04 RX ADMIN — Medication 1 TABLET: at 17:20

## 2021-01-04 RX ADMIN — PRAVASTATIN SODIUM 40 MG: 40 TABLET ORAL at 21:22

## 2021-01-04 RX ADMIN — ACETAMINOPHEN 650 MG: 325 TABLET, FILM COATED ORAL at 21:25

## 2021-01-04 RX ADMIN — METOPROLOL TARTRATE 25 MG: 25 TABLET, FILM COATED ORAL at 21:21

## 2021-01-04 RX ADMIN — ATOVAQUONE 750 MG: 750 SUSPENSION ORAL at 11:47

## 2021-01-04 RX ADMIN — TRAMADOL HYDROCHLORIDE 50 MG: 50 TABLET, FILM COATED ORAL at 21:23

## 2021-01-04 RX ADMIN — ACETAMINOPHEN 650 MG: 325 TABLET, FILM COATED ORAL at 11:47

## 2021-01-04 RX ADMIN — VANCOMYCIN HYDROCHLORIDE 1000 MG: 1 INJECTION, SOLUTION INTRAVENOUS at 19:36

## 2021-01-04 RX ADMIN — GABAPENTIN 300 MG: 300 CAPSULE ORAL at 17:20

## 2021-01-04 RX ADMIN — DICLOFENAC SODIUM 2 G: 10 GEL TOPICAL at 21:21

## 2021-01-04 RX ADMIN — DICLOFENAC SODIUM 2 G: 10 GEL TOPICAL at 18:29

## 2021-01-04 RX ADMIN — HEPARIN SODIUM 5000 UNITS: 5000 INJECTION INTRAVENOUS; SUBCUTANEOUS at 21:21

## 2021-01-04 RX ADMIN — LIDOCAINE 5% 1 PATCH: 700 PATCH TOPICAL at 18:17

## 2021-01-04 RX ADMIN — MYCOPHENOLIC ACID 720 MG: 180 TABLET, DELAYED RELEASE ORAL at 18:30

## 2021-01-04 RX ADMIN — CEFEPIME HYDROCHLORIDE 2000 MG: 2 INJECTION, POWDER, FOR SOLUTION INTRAVENOUS at 13:48

## 2021-01-04 RX ADMIN — PREDNISONE 25 MG: 20 TABLET ORAL at 18:28

## 2021-01-04 RX ADMIN — ATOVAQUONE 750 MG: 750 SUSPENSION ORAL at 18:19

## 2021-01-04 RX ADMIN — TACROLIMUS 2 MG: 1 CAPSULE ORAL at 17:26

## 2021-01-04 NOTE — ASSESSMENT & PLAN NOTE
Seen on CT elevated procalcitonin and leukocytosis and hypoxia  Patient denies fever  Since patient is immunocompromised and history of bilateral lung transplant will continue the patient on vancomycin and cefepime empirically for now  Follow-up cultures  Will consult pulmonology given lung transplant  Patient currently awaiting transferred to hospitals  As per ER discussion with hospitals, no beds available today

## 2021-01-04 NOTE — H&P
H&P- Mat Deeds [birth year], 65 y o  female MRN: [id number]    Unit/Bed#: DOM Encounter: [id number]    Primary Care Provider: Moni Hancock DO   Date and time admitted to hospital: 1/3/2021 12:48 PM        * Pneumonia due to infectious organism  Assessment & Plan  Seen on CT elevated procalcitonin and leukocytosis and hypoxia  Patient denies fever  Since patient is immunocompromised and history of bilateral lung transplant will continue the patient on vancomycin and cefepime empirically for now  Follow-up cultures  Will consult pulmonology given lung transplant  Patient currently awaiting transferred to [facility]  As per ER discussion with [facility], no beds available today  Lung transplant status, bilateral ([address] )  Assessment & Plan  Continue tacrolimus and mycophenolate  Patient on prednisone but it is for her arthritis  Pulmonary consult  Acute respiratory failure with hypoxia [facility])  Assessment & Plan  Most likely due to pneumonia in a patient with lung transplant  Currently on 2 L saturating well  Continue monitor closely and wean off as tolerated  Stage 3 chronic kidney disease  Assessment & Plan  Lab Results   Component Value Date    EGFR 36 01/04/2021    EGFR 35 01/03/2021    EGFR 32 8 06/24/2016    CREATININE 1 44 (H) 01/04/2021    CREATININE 1 47 (H) 01/03/2021    CREATININE 1 57 (H) 06/24/2016       Creatinine currently stable and at baseline  Monitor  VTE Prophylaxis: Heparin  / sequential compression device   Code Status: full  POLST: POLST form is not discussed and not completed at this time  Discussion with family: pt    Anticipated Length of Stay:  Patient will be admitted on an Inpatient basis with an anticipated length of stay of  > 2 midnights  Justification for Hospital Stay: above    Total Time for Visit, including Counseling / Coordination of Care: 45 minutes    Greater than 50% of this total time spent on direct patient counseling and coordination of care     Chief Complaint:   Worsening SOB    History of Present Illness:    Lara Ha is a 65 y o  female who presents with worsening shortness breath over last few weeks  It worsened to on and over last few days that she was feeling short of breath even going from bed to walker  Says she has mild cough female but thinks it is due to postnasal drip  Has history of bilateral lung transplant done at [facility] and currently on immunosuppressants  Patient said she has never been on oxygen since the lung transplant  Denies any sick contacts  Also has history of possible hemochromatosis per patient  Review of Systems:    Review of Systems   Constitutional: Negative for chills and fever  HENT: Positive for congestion and postnasal drip  Negative for sore throat  Respiratory: Positive for cough and shortness of breath  Cardiovascular: Negative for chest pain and palpitations  Gastrointestinal: Negative for abdominal pain, nausea and vomiting  Genitourinary: Negative for dysuria, flank pain, frequency and urgency  Musculoskeletal: Negative for arthralgias and myalgias  Skin: Negative for color change and rash  Neurological: Negative for dizziness and light-headedness  Psychiatric/Behavioral: Negative for agitation, behavioral problems and confusion  Past Medical and Surgical History:     Past Medical History:   Diagnosis Date    Anxiety     Arthritis     Blood clot of neck vein     R side    Broken leg     H/O pulmonary fibrosis     High cholesterol     Kidney disease     Lung disease     Pneumonitis     Spinal stenosis        Past Surgical History:   Procedure Laterality Date    APPENDECTOMY      KNEE ARTHROSCOPY      LUNG TRANSPLANT, DOUBLE  09/17/2015    Pt had fibrosis and pneumonitis    TONSILLECTOMY         Meds/Allergies:    Prior to Admission medications    Medication Sig Start Date End Date Taking?  Authorizing Provider   atovaquone (MEPRON) 750 mg/5 mL suspension Take 750 mg by mouth 2 (two) times a day  Yes Historical Provider, MD   Calcium Carb-Cholecalciferol (CALCIUM 600+D) 600-800 MG-UNIT TABS Take by mouth 2 (two) times a day   Yes Historical Provider, MD   citalopram (CeleXA) 10 mg tablet Take 10 mg by mouth daily   Yes Historical Provider, MD   Cyanocobalamin (HM SUPER VITAMIN B12 PO) Take 1,000 mcg by mouth daily    Yes Historical Provider, MD   cycloSPORINE (RESTASIS) 0 05 % ophthalmic emulsion 1 drop 2 (two) times a day   Yes Historical Provider, MD   diclofenac sodium (VOLTAREN) 1 % Apply 2 g topically 4 (four) times a day Apply to 2g qid to hands bilaterally 5/27/20  Yes Eric Maher, DO   gabapentin (NEURONTIN) 300 mg capsule Take 300 mg by mouth 2 (two) times a day 4/4/20  Yes Historical Provider, MD   Magnesium Cl-Calcium Carbonate (SLOW-MAG PO) Take 128 mg by mouth daily Takes 2 tablets in AM   Yes Historical Provider, MD   metoprolol tartrate (LOPRESSOR) 25 mg tablet Take 25 mg by mouth 2 (two) times a day  4/4/20  Yes Historical Provider, MD   mycophenolate (MYFORTIC) 360 MG TBEC Take 720 mg by mouth 2 (two) times a day    Yes Historical Provider, MD   pantoprazole (PROTONIX) 40 mg tablet Take 40 mg by mouth daily     Yes Historical Provider, MD   pravastatin (PRAVACHOL) 40 mg tablet Take 40 mg by mouth daily at bedtime    Yes Historical Provider, MD   predniSONE 10 mg tablet Take 4 tabs x2 weeks, then 3 tabs x2 weeks, then 2 5 tabs x2 weeks, then go to 2 tabs daily and stay on that dose 12/4/20  Yes Blair Corcoran,    predniSONE 5 mg tablet Take 10 mg by mouth daily    Yes Historical Provider, MD   risedronate (ACTONEL) 35 mg tablet Take 35 mg by mouth every 7 days Before breakfast 4/21/20  Yes Historical Provider, MD   Tacrolimus (PROGRAF PO) Take 2 mg by mouth 2 (two) times a day    Yes Historical Provider, MD   traMADol (ULTRAM) 50 mg tablet Take 50 mg by mouth daily     Yes Historical Provider, MD   Vitamin D, Ergocalciferol, 2000 units CAPS daily    Yes Historical Provider, MD   docusate sodium (COLACE) 100 mg capsule Take 100 mg by mouth as needed for constipation  Historical Provider, MD   lidocaine (XYLOCAINE) 5 % ointment Apply topically 3 (three) times a day as needed for mild pain  Patient not taking: Reported on 1/3/2021 8/30/18   Fidelia Maher DO   Multiple Vitamins-Minerals (MULTIPLE VITAMINS/WOMENS PO) Take by mouth daily  Historical Provider, MD   mycophenolate (CELLCEPT) 250 mg capsule Take 1,000 mg by mouth 2 (two) times a day      Historical Provider, MD   tacrolimus (PROGRAF) 0 5 mg capsule TK 2 CS PO BID 4/12/20   Historical Provider, MD   tacrolimus (PROGRAF) 1 mg capsule TK 3 CS PO BID 4/12/20   Historical Provider, MD   zolpidem (AMBIEN) 5 mg tablet Take 1 tablet (5 mg total) by mouth daily at bedtime as needed for sleep  Patient not taking: Reported on 1/3/2021 6/12/18   Isaiah Sorto,      I have reviewed home medications with patient personally  Allergies:    Allergies   Allergen Reactions    Zofran [Ondansetron] Hallucinations    Bactrim [Sulfamethoxazole-Trimethoprim] Rash       Social History:     Marital Status: /Civil Union   Occupation:   Patient Pre-hospital Living Situation:   Patient Pre-hospital Level of Mobility:   Patient Pre-hospital Diet Restrictions:   Substance Use History:   Social History     Substance and Sexual Activity   Alcohol Use No     Social History     Tobacco Use   Smoking Status Never Smoker   Smokeless Tobacco Never Used     Social History     Substance and Sexual Activity   Drug Use No       Family History:    Family History   Problem Relation Age of Onset    Hypertension Mother     Skin cancer Mother    Estefania Slipper COPD Father     Hypertension Father         ESSENTIAL    Lung cancer Father     Lupus Sister     Lupus Other     Cancer Maternal Grandfather     No Known Problems Sister     Stomach cancer Maternal Uncle        Physical Exam: Vitals:   Blood Pressure: 169/80 (01/04/21 1456)  Pulse: 71 (01/04/21 1456)  Temperature: 98 4 °F (36 9 °C) (01/04/21 1456)  Temp Source: Oral (01/04/21 1456)  Respirations: 20 (01/04/21 1456)  Height: 5' 4" (162 6 cm) (01/04/21 1456)  Weight - Scale: 68 kg (149 lb 14 6 oz) (01/04/21 1456)  SpO2: 93 % (01/04/21 1456)    Physical Exam    Constitutional: Pt appears comfortable  Not in any acute distress  HENT:   Head: Normocephalic and atraumatic  Eyes: EOM are normal    Neck: Neck supple  Cardiovascular: Normal rate, regular rhythm, normal heart sounds  No murmur heard  Pulmonary/Chest: Effort normal, air entry b/l equal  No respiratory distress  Pt has no wheezes or crackles  Abdominal: Soft  Non-distended, Non-tender  Bowel sounds are normal    Musculoskeletal: Normal range of motion  Neurological: awake, alert  Moving all extremities spontaneously  Psychiatric: normal mood and affect  Additional Data:     Lab Results: I have personally reviewed pertinent reports  Results from last 7 days   Lab Units 01/04/21  1335   WBC Thousand/uL 14 12*   HEMOGLOBIN g/dL 10 9*   HEMATOCRIT % 36 1   PLATELETS Thousands/uL 185   BANDS PCT % 3   LYMPHO PCT % 8*   MONO PCT % 0*   EOS PCT % 3     Results from last 7 days   Lab Units 01/04/21  1335 01/03/21  1240   SODIUM mmol/L 139 136   POTASSIUM mmol/L 5 1 4 8   CHLORIDE mmol/L 107 104   CO2 mmol/L 24 22   BUN mg/dL 51* 51*   CREATININE mg/dL 1 44* 1 47*   ANION GAP mmol/L 8 10   CALCIUM mg/dL 9 0 9 9   ALBUMIN g/dL  --  3 3*   TOTAL BILIRUBIN mg/dL  --  0 41   ALK PHOS U/L  --  81   ALT U/L  --  32   AST U/L  --  21   GLUCOSE RANDOM mg/dL 116 128                 Results from last 7 days   Lab Units 01/04/21  0535 01/03/21  1922 01/03/21  1552 01/03/21  1315   LACTIC ACID mmol/L  --   --  1 6 2 1*   PROCALCITONIN ng/ml 1 19* 1 31*  --   --        Imaging: I have personally reviewed pertinent reports        No orders to display       EKG, Pathology, and Other Studies Reviewed on Admission:   · EKG:     Allscripts / Epic Records Reviewed: Yes     ** Please Note: This note has been constructed using a voice recognition system   **

## 2021-01-04 NOTE — ASSESSMENT & PLAN NOTE
Lab Results   Component Value Date    EGFR 36 01/04/2021    EGFR 35 01/03/2021    EGFR 32 8 06/24/2016    CREATININE 1 44 (H) 01/04/2021    CREATININE 1 47 (H) 01/03/2021    CREATININE 1 57 (H) 06/24/2016       Creatinine currently stable and at baseline  Monitor

## 2021-01-04 NOTE — PLAN OF CARE
Problem: Potential for Falls  Goal: Patient will remain free of falls  Description: INTERVENTIONS:  - Assess patient frequently for physical needs  -  Identify cognitive and physical deficits and behaviors that affect risk of falls  -  [place] fall precautions as indicated by assessment   - Educate patient/family on patient safety including physical limitations  - Instruct patient to call for assistance with activity based on assessment  - Modify environment to reduce risk of injury  - Consider OT/PT consult to assist with strengthening/mobility  Outcome: Progressing     Problem: PAIN - ADULT  Goal: Verbalizes/displays adequate comfort level or baseline comfort level  Description: Interventions:  - Encourage patient to monitor pain and request assistance  - Assess pain using appropriate pain scale  - Administer analgesics based on type and severity of pain and evaluate response  - Implement non-pharmacological measures as appropriate and evaluate response  - Consider cultural and social influences on pain and pain management  - Notify physician/advanced practitioner if interventions unsuccessful or patient reports new pain  Outcome: Progressing     Problem: SAFETY ADULT  Goal: Patient will remain free of falls  Description: INTERVENTIONS:  - Assess patient frequently for physical needs  -  Identify cognitive and physical deficits and behaviors that affect risk of falls    -  [place] fall precautions as indicated by assessment   - Educate patient/family on patient safety including physical limitations  - Instruct patient to call for assistance with activity based on assessment  - Modify environment to reduce risk of injury  - Consider OT/PT consult to assist with strengthening/mobility  Outcome: Progressing  Goal: Maintain or return to baseline ADL function  Description: INTERVENTIONS:  -  Assess patient's ability to carry out ADLs; assess patient's baseline for ADL function and identify physical deficits which impact ability to perform ADLs (bathing, care of mouth/teeth, toileting, grooming, dressing, etc )  - Assess/evaluate cause of self-care deficits   - Assess range of motion  - Assess patient's mobility; develop plan if impaired  - Assess patient's need for assistive devices and provide as appropriate  - Encourage maximum independence but intervene and supervise when necessary  - Involve family in performance of ADLs  - Assess for home care needs following discharge   - Consider OT consult to assist with ADL evaluation and planning for discharge  - Provide patient education as appropriate  Outcome: Progressing  Goal: Maintain or return mobility status to optimal level  Description: INTERVENTIONS:  - Assess patient's baseline mobility status (ambulation, transfers, stairs, etc )    - Identify cognitive and physical deficits and behaviors that affect mobility  - Identify mobility aids required to assist with transfers and/or ambulation (gait belt, sit-to-stand, lift, walker, cane, etc )  - [place] fall precautions as indicated by assessment  - Record patient progress and toleration of activity level on Mobility SBAR; progress patient to next Phase/Stage  - Instruct patient to call for assistance with activity based on assessment  - Consider rehabilitation consult to assist with strengthening/weightbearing, etc   Outcome: Progressing     Problem: DISCHARGE PLANNING  Goal: Discharge to home or other facility with appropriate resources  Description: INTERVENTIONS:  - Identify barriers to discharge w/patient and caregiver  - Arrange for needed discharge resources and transportation as appropriate  - Identify discharge learning needs (meds, wound care, etc )  - Arrange for interpretive services to assist at discharge as needed  - Refer to Case Management Department for coordinating discharge planning if the patient needs post-hospital services based on physician/advanced practitioner order or complex needs related to functional status, cognitive ability, or social support system  Outcome: Progressing     Problem: RESPIRATORY - ADULT  Goal: Achieves optimal ventilation and oxygenation  Description: INTERVENTIONS:  - Assess for changes in respiratory status  - Assess for changes in mentation and behavior  - Position to facilitate oxygenation and minimize respiratory effort  - Oxygen administered by appropriate delivery if ordered  - Initiate smoking cessation education as indicated  - Encourage broncho-pulmonary hygiene including cough, deep breathe, Incentive Spirometry  - Assess the need for suctioning and aspirate as needed  - Assess and instruct to report SOB or any respiratory difficulty  - Respiratory Therapy support as indicated  Outcome: Progressing     Problem: SKIN/TISSUE INTEGRITY - ADULT  Goal: Skin integrity remains intact  Description: INTERVENTIONS  - Identify patients at risk for skin breakdown  - Assess and monitor skin integrity  - Assess and monitor nutrition and hydration status  - Monitor labs (i e  albumin)  - Assess for incontinence   - Turn and reposition patient  - Assist with mobility/ambulation  - Relieve pressure over bony prominences  - Avoid friction and shearing  - Provide appropriate hygiene as needed including keeping skin clean and dry  - Evaluate need for skin moisturizer/barrier cream  - Collaborate with interdisciplinary team (i e  Nutrition, Rehabilitation, etc )   - Patient/family teaching  Outcome: Progressing     Problem: MUSCULOSKELETAL - ADULT  Goal: Maintain or return mobility to safest level of function  Description: INTERVENTIONS:  - Assess patient's ability to carry out ADLs; assess patient's baseline for ADL function and identify physical deficits which impact ability to perform ADLs (bathing, care of mouth/teeth, toileting, grooming, dressing, etc )  - Assess/evaluate cause of self-care deficits   - Assess range of motion  - Assess patient's mobility  - Assess patient's need for assistive devices and provide as appropriate  - Encourage maximum independence but intervene and supervise when necessary  - Involve family in performance of ADLs  - Assess for home care needs following discharge   - Consider OT consult to assist with ADL evaluation and planning for discharge  - Provide patient education as appropriate  Outcome: Progressing

## 2021-01-04 NOTE — ASSESSMENT & PLAN NOTE
Most likely due to pneumonia in a patient with lung transplant  Currently on 2 L saturating well  Continue monitor closely and wean off as tolerated

## 2021-01-04 NOTE — ASSESSMENT & PLAN NOTE
Continue tacrolimus and mycophenolate  Patient on prednisone but it is for her arthritis  Pulmonary consult

## 2021-01-04 NOTE — CONSULTS
Consult Note - Pulmonary   Anthony Santos 65 y o  female MRN: [id number]  Unit/Bed#: S -01 Encounter: [id number]      Reason for consultation: Pneumonia, Lung transplant infection     Requesting physician: Dr Jose Wheatley    A/P:     Pneumonia, multifactorial   Afebrile   Procal 1 29 > 1 19  WBC 19 > 14  CT Chest: "Diffuse groundglass airspace opacity with associated reticulation/septal lobular thickening, a mosaic pattern, mid and lower lung zone predominance, and mild associated traction bronchiectasis most consistent with fibrotic lung disease  In this patient with reported history of lung transplant due to idiopathic pulmonary fibrosis, this probably represents early usual interstitial pneumonia (prior to the development of honeycombing)  Alternatively, this could represent nonspecific   interstitial pneumonitis  Chronic upper sensitivity pneumonitis or cryptogenic organizing pneumonia are considered less likely but possible "  Monitor O2 req  Continue abx (vanc, cefepime)     S/p Double Lung Transplant 2/2 to IPF  Continue transplant medications tacrolimus and mycophenolate   Currently taking prednisone for arthritis, followed by rheum  Monitor Tacrolimus level  Will contact pts transplant team at [place] in am    Acute Resp Failure w/ Hypoxia  o2 saturation 93% on 2L NC  Continue to monitor and assess requirments    Patient is currently awaiting transfer to [facility] however no beds available today    History of Present Illness   HPI:  Anthony Santos is a 65 y o  female who presented to ED yesterday for SOB  She has a pmhx pulmonary fibrosis s/p double lung transplant at [facility] that was completed Sept 2015  She is maintained on her transplant medication tacrolimus and mycophenolate  Ms Aurelia Brooke reports she "was fine until about a month ago" normal baseline o2 saturation is about 97% however she began to find her saturations were in low 90s  She is not on home oxygen   She says when they reached low 90s, she noticed she became more fatigued and called transplant team  She also reports that a few days ago she had a desaturation into the 80s when active, lowest 79%  Transplant team sent her for chest CT yesterday am  the radiology team brought patient to ED as her hospital because her oxygen saturations were in the 80s  When she got to ED, o2 sat was 88% on RA and was placed on NC  Per chart review, patients  reported she has recently been on a steroid taper for knee pain and arthritis -  followed by hematology and rheumatology for this  At bedside exam this evening, patient reports she feels 'fine"  She says that she has had a dry cough for "a little while" now but said shed been having sinus issues/post nasal drip lately which she think is the cause  Denies chest pain, palpitations, hemoptysis, fever, chills, n/v    [facility]  Review of systems:  12 point review of systems was completed and was otherwise negative except as listed in HPI        Historical Information   Past Medical History:   Diagnosis Date    Anxiety     Arthritis     Blood clot of neck vein     R side    Broken leg     H/O pulmonary fibrosis     High cholesterol     Kidney disease     Lung disease     Pneumonitis     Spinal stenosis      Past Surgical History:   Procedure Laterality Date    APPENDECTOMY      KNEE ARTHROSCOPY      LUNG TRANSPLANT, DOUBLE  09/17/2015    Pt had fibrosis and pneumonitis    TONSILLECTOMY       Family History   Problem Relation Age of Onset    Hypertension Mother     Skin cancer Mother     COPD Father     Hypertension Father         ESSENTIAL    Lung cancer Father     Lupus Sister     Lupus Other     Cancer Maternal Grandfather     No Known Problems Sister     Stomach cancer Maternal Uncle        Occupational History:     Social History: Never smoker, no travel hx, no sick contact    Meds/Allergies   Current Facility-Administered Medications   Medication Dose Route Frequency    acetaminophen (TYLENOL) tablet 650 mg  650 mg Oral Q6H PRN    atovaquone (MEPRON) oral suspension 750 mg  750 mg Oral BID With Meals    calcium carbonate-vitamin D (OSCAL-D) 500 mg-200 units per tablet 1 tablet  1 tablet Oral BID With Meals    [START ON 1/5/2021] cefepime (MAXIPIME) 2 g/50 mL dextrose IVPB  2,000 mg Intravenous Q12H    citalopram (CeleXA) tablet 10 mg  10 mg Oral Daily    cyanocobalamin (VITAMIN B-12) tablet 1,000 mcg  1,000 mcg Oral Daily    Diclofenac Sodium (VOLTAREN) 1 % topical gel 2 g  2 g Topical 4x Daily    docusate sodium (COLACE) capsule 100 mg  100 mg Oral BID    gabapentin (NEURONTIN) capsule 300 mg  300 mg Oral BID    heparin (porcine) subcutaneous injection 5,000 Units  5,000 Units Subcutaneous Q8H [address]    lidocaine (LIDODERM) 5 % patch 1 patch  1 patch Topical Daily    metoprolol tartrate (LOPRESSOR) tablet 25 mg  25 mg Oral BID    mycophenolic acid (MYFORTIC) EC tablet 720 mg  720 mg Oral BID    pantoprazole (PROTONIX) EC tablet 40 mg  40 mg Oral Daily    pravastatin (PRAVACHOL) tablet 40 mg  40 mg Oral HS    predniSONE tablet 25 mg  25 mg Oral Daily    Followed by   Juan Jose Bake ON 1/10/2021] predniSONE tablet 20 mg  20 mg Oral Daily    Followed by   Juan Jose Bake ON 1/17/2021] predniSONE tablet 10 mg  10 mg Oral Daily    tacrolimus (PROGRAF) capsule 2 mg  2 mg Oral BID    traMADol (ULTRAM) tablet 50 mg  50 mg Oral Daily    vancomycin (VANCOCIN) IVPB (premix in dextrose) 1,000 mg 200 mL  15 mg/kg Intravenous Daily PRN     Medications Prior to Admission   Medication    atovaquone (MEPRON) 750 mg/5 mL suspension    Calcium Carb-Cholecalciferol (CALCIUM 600+D) 600-800 MG-UNIT TABS    citalopram (CeleXA) 10 mg tablet    Cyanocobalamin (HM SUPER VITAMIN B12 PO)    cycloSPORINE (RESTASIS) 0 05 % ophthalmic emulsion    diclofenac sodium (VOLTAREN) 1 %    gabapentin (NEURONTIN) 300 mg capsule    Magnesium Cl-Calcium Carbonate (SLOW-MAG PO)    metoprolol tartrate (LOPRESSOR) 25 mg tablet    mycophenolate (MYFORTIC) 360 MG TBEC    pantoprazole (PROTONIX) 40 mg tablet    pravastatin (PRAVACHOL) 40 mg tablet    predniSONE 10 mg tablet    predniSONE 5 mg tablet    risedronate (ACTONEL) 35 mg tablet    Tacrolimus (PROGRAF PO)    traMADol (ULTRAM) 50 mg tablet    Vitamin D, Ergocalciferol, 2000 units CAPS    docusate sodium (COLACE) 100 mg capsule    lidocaine (XYLOCAINE) 5 % ointment    Multiple Vitamins-Minerals (MULTIPLE VITAMINS/WOMENS PO)    mycophenolate (CELLCEPT) 250 mg capsule    tacrolimus (PROGRAF) 0 5 mg capsule    tacrolimus (PROGRAF) 1 mg capsule    zolpidem (AMBIEN) 5 mg tablet     Allergies   Allergen Reactions    Zofran [Ondansetron] Hallucinations    Bactrim [Sulfamethoxazole-Trimethoprim] Rash       Vitals: Blood pressure 169/80, pulse 71, temperature 98 4 °F (36 9 °C), temperature source Oral, resp  rate 20, height 5' 4" (1 626 m), weight 68 kg (149 lb 14 6 oz), SpO2 93 %, not currently breastfeeding  , 2L NC, Body mass index is 25 73 kg/m²  Intake/Output Summary (Last 24 hours) at 1/4/2021 1733  Last data filed at 1/3/2021 1922  Gross per 24 hour   Intake 150 ml   Output    Net 150 ml     Physical Exam  Vitals signs and nursing note reviewed  Constitutional:       General: She is not in acute distress  HENT:      Head: Normocephalic and atraumatic  Comments: Small scabs w/ dried blood around b/l external nares  Neck:      Trachea: No tracheal deviation  Cardiovascular:      Rate and Rhythm: Normal rate and regular rhythm  Pulses: Normal pulses  Heart sounds: Normal heart sounds  No murmur  Pulmonary:      Effort: Tachypnea (mild) and accessory muscle usage (mild) present  Breath sounds: Decreased breath sounds (mildy) present  Chest:      Chest wall: No tenderness     Abdominal:      General: Bowel sounds are normal       Palpations: Abdomen is soft  Tenderness: There is no abdominal tenderness  Musculoskeletal:      Right lower leg: She exhibits no tenderness  No edema  Left lower leg: She exhibits no tenderness  No edema  Skin:     General: Skin is warm and dry  Capillary Refill: Capillary refill takes 2 to 3 seconds  Coloration: Skin is pale  Findings: No rash  Neurological:      General: No focal deficit present  Mental Status: She is alert and oriented to person, place, and time  Psychiatric:         Mood and Affect: Mood normal          Behavior: Behavior normal        Labs:    I have personally reviewed pertinent lab results  Lab Results   Component Value Date    WBC 14 12 (H) 01/04/2021    HGB 10 9 (L) 01/04/2021    HCT 36 1 01/04/2021     (H) 01/04/2021     01/04/2021    MCH 32 5 01/04/2021    MCHC 30 2 (L) 01/04/2021    RDW 14 6 01/04/2021    MPV 9 8 01/04/2021    NRBC 0 01/04/2021   , CMP:   Lab Results   Component Value Date    SODIUM 139 01/04/2021    K 5 1 01/04/2021     01/04/2021    CO2 24 01/04/2021    BUN 51 (H) 01/04/2021    CREATININE 1 44 (H) 01/04/2021    CALCIUM 9 0 01/04/2021    EGFR 36 01/04/2021     Results from last 7 days   Lab Units 01/04/21  1647 01/04/21  1335 01/03/21  1240   WBC Thousand/uL  --  14 12* 18 42*   HEMOGLOBIN g/dL  --  10 9* 12 4   HEMATOCRIT %  --  36 1 41 0   PLATELETS Thousands/uL 195 185 239   BANDS PCT %  --  3 3   MONO PCT %  --  0* 3*     Results from last 7 days   Lab Units 01/04/21  1335 01/03/21  1240   SODIUM mmol/L 139 136   POTASSIUM mmol/L 5 1 4 8   CHLORIDE mmol/L 107 104   CO2 mmol/L 24 22   ANION GAP mmol/L 8 10   BUN mg/dL 51* 51*   CREATININE mg/dL 1 44* 1 47*   CALCIUM mg/dL 9 0 9 9   GLUCOSE RANDOM mg/dL 116 128   ALT U/L  --  32   AST U/L  --  21   ALK PHOS U/L  --  81   ALBUMIN g/dL  --  3 3*   TOTAL BILIRUBIN mg/dL  --  0 41               Results from last 7 days   Lab Units 01/03/21  1240   TROPONIN I ng/mL 0 03     Results from last 7 days   Lab Units 01/03/21  1552 01/03/21  1315   LACTIC ACID mmol/L 1 6 2 1*                     Results from last 7 days   Lab Units 01/04/21  0535 01/03/21  1922   PROCALCITONIN ng/ml 1 19* 1 31*     Imaging and other studies: I have personally reviewed pertinent reports  and I have personally reviewed pertinent films in PACS    CT Chest:   LUNGS: Anastomotic staple lines at right and left mainstem bronchus with expected appearance  Groundglass changes with mid and lower lung zone predominance, somewhat mosaic pattern, and associated septal lobular thickening along with mild tubular traction bronchiectasis noted  No nodularity or honeycombing  No alveolar consolidation  No pulmonary mass  No tracheal or endobronchial lesion  No calcified granulomata  "Diffuse groundglass airspace opacity with associated reticulation/septal lobular thickening, a mosaic pattern, mid and lower lung zone predominance, and mild associated traction bronchiectasis most consistent with fibrotic lung disease  In this patient with reported history of lung transplant due to idiopathic pulmonary fibrosis, this probably represents early usual interstitial pneumonia (prior to the development of honeycombing)  Alternatively, this could represent nonspecific   interstitial pneumonitis    Chronic upper sensitivity pneumonitis or cryptogenic organizing pneumonia are considered less likely but possible "    Code Status: Level 1 - Full Code    Diogenes Thorne MD  Family Medicine PGY-1  1/4/2021  5:33 PM

## 2021-01-04 NOTE — PROGRESS NOTES
Vancomycin Assessment    Nuzhat Stone is a 65 y o  female who is currently receiving vancomycin for pneumonia  Relevant clinical data and objective history reviewed:  Creatinine   Date Value Ref Range Status   01/03/2021 1 47 (H) 0 60 - 1 30 mg/dL Final     Comment:     Standardized to IDMS reference method   06/24/2016 1 57 (H) 0 60 - 1 30 mg/dL Final     Comment:     Standardized to IDMS reference method   06/16/2016 2 34 (H) 0 50 - 0 99 mg/dL Final     Comment:     Result Comment: For patients >52years of age, the reference limit  for Creatinine is approximately 13% higher for people  identified as -American      06/14/2016 1 80 (H) 0 60 - 1 30 mg/dL Final     Comment:     Standardized to IDMS reference method   02/16/2015 0 57 (L) 0 60 - 1 30 mg/dL Final     Comment:     Standardized to IDMS reference method   01/31/2015 0 89 0 60 - 1 30 mg/dL Final     Comment:     Standardized to IDMS reference method     /72   Pulse 70   Temp 98 °F (36 7 °C) (Oral)   Resp 18   Ht 5' 4" (1 626 m)   Wt 68 kg (150 lb)   LMP  (LMP Unknown)   SpO2 100%   BMI 25 75 kg/m²   I/O last 3 completed shifts: In: 700 [IV Piggyback:700]  Out: -   Lab Results   Component Value Date/Time    BUN 51 (H) 01/03/2021 12:40 PM    BUN 51 (H) 06/16/2016 01:40 PM    WBC 18 42 (H) 01/03/2021 12:40 PM    WBC 14 6 (H) 06/16/2016 01:40 PM    HGB 12 4 01/03/2021 12:40 PM    HGB 8 3 (L) 06/16/2016 01:40 PM    HCT 41 0 01/03/2021 12:40 PM    HCT 26 0 (L) 06/16/2016 01:40 PM     (H) 01/03/2021 12:40 PM     4 (H) 06/16/2016 01:40 PM     01/03/2021 12:40 PM    PLT  06/16/2016 01:40 PM     Review of the peripheral smear revealsdecreased numbers of platelets      PLT 92 (L) 06/16/2016 01:40 PM     Temp Readings from Last 3 Encounters:   01/03/21 98 °F (36 7 °C) (Oral)   11/04/20 98 6 °F (37 °C) (Temporal)   09/09/20 (!) 96 9 °F (36 1 °C) (Temporal)     Vancomycin Days of Therapy: 2    Assessment/Plan  The patient is currently on vancomycin utilizing pulse dosing  Baseline risks associated with therapy include: pre-existing renal impairment and advanced age  The patient received a loading dose of 1250 mg on 01/03/2021 at around 1711  Due to her renal function a random level will be obtained today on 01/04/2021 at 1630 before dosing the patient again  Patient will be dosed with a 15 mg/kg dose if level < 20 based on a goal of 15-20  Pharmacy will continue to follow closely for s/sx of nephrotoxicity, infusion reactions and appropriateness of therapy  BMP and CBC will be ordered per protocol        Kalpesh Alvarado, Pharmacist

## 2021-01-04 NOTE — ED NOTES
Tanika Durham from [place]  Given update  On patient per Tanika Durham patient should be getting a bed sometime this morning       Vanessa Morales RN  01/04/21 1186

## 2021-01-05 NOTE — PROGRESS NOTES
Vancomycin Assessment    Olinda Armando is a 65 y o  female who is currently receiving vancomycin 15 mg/kg daily PRN random vanco level < 20 for Pneumonia     Relevant clinical data and objective history reviewed:  Creatinine   Date Value Ref Range Status   01/04/2021 1 44 (H) 0 60 - 1 30 mg/dL Final     Comment:     Standardized to IDMS reference method   01/03/2021 1 47 (H) 0 60 - 1 30 mg/dL Final     Comment:     Standardized to IDMS reference method   06/24/2016 1 57 (H) 0 60 - 1 30 mg/dL Final     Comment:     Standardized to IDMS reference method   06/16/2016 2 34 (H) 0 50 - 0 99 mg/dL Final     Comment:     Result Comment: For patients >52years of age, the reference limit  for Creatinine is approximately 13% higher for people  identified as -American      02/16/2015 0 57 (L) 0 60 - 1 30 mg/dL Final     Comment:     Standardized to IDMS reference method   01/31/2015 0 89 0 60 - 1 30 mg/dL Final     Comment:     Standardized to IDMS reference method     Vancomycin Rm   Date Value Ref Range Status   01/04/2021 10 8 ug/mL Final     /80 (BP Location: Left arm)   Pulse 71   Temp 98 4 °F (36 9 °C) (Oral)   Resp 20   Ht 5' 4" (1 626 m)   Wt 68 kg (149 lb 14 6 oz)   LMP  (LMP Unknown)   SpO2 93%   BMI 25 73 kg/m²   I/O last 3 completed shifts: In: 940 [P O :240; IV Piggyback:700]  Out: 300 [Urine:300]  Lab Results   Component Value Date/Time    BUN 51 (H) 01/04/2021 01:35 PM    BUN 51 (H) 06/16/2016 01:40 PM    WBC 14 12 (H) 01/04/2021 01:35 PM    WBC 14 6 (H) 06/16/2016 01:40 PM    HGB 10 9 (L) 01/04/2021 01:35 PM    HGB 8 3 (L) 06/16/2016 01:40 PM    HCT 36 1 01/04/2021 01:35 PM    HCT 26 0 (L) 06/16/2016 01:40 PM     (H) 01/04/2021 01:35 PM     4 (H) 06/16/2016 01:40 PM     01/04/2021 04:47 PM    PLT  06/16/2016 01:40 PM     Review of the peripheral smear revealsdecreased numbers of platelets      PLT 92 (L) 06/16/2016 01:40 PM     Temp Readings from Last 3 Encounters:   01/04/21 98 4 °F (36 9 °C) (Oral)   11/04/20 98 6 °F (37 °C) (Temporal)   09/09/20 (!) 96 9 °F (36 1 °C) (Temporal)     Vancomycin Days of Therapy: 2    Assessment/Plan  The patient is currently on vancomycin utilizing pulse dosing  Baseline risks associated with therapy include: pre-existing renal impairment, concomitant nephrotoxic medications, and advanced age  The patient is receiving 15 mg/kg daily PRN random vanco level < 20 with the most recent vancomycin level being not at steady-state and 10 8 after one dose  Patient's renal function is currently stable and close to baseline creatinine of 1 2-1 3, therefore, after clinical evaluation, dosing will be changed to 750 mg IV Q24H  Pharmacy will continue to follow closely for s/sx of nephrotoxicity, infusion reactions, and appropriateness of therapy  BMP and CBC will be ordered per protocol  Plan for trough as patient approaches steady state, prior to the 4th  dose at approximately 1900 on 1/7  Pharmacy will continue to follow the patients culture results and clinical progress daily      Kristen Cristina, Pharmacist

## 2021-01-06 ENCOUNTER — TRANSITIONAL CARE MANAGEMENT (OUTPATIENT)
Dept: FAMILY MEDICINE CLINIC | Facility: CLINIC | Age: 71
End: 2021-01-06

## 2021-01-06 LAB
BACTERIA BLD CULT: ABNORMAL
GRAM STN SPEC: ABNORMAL

## 2021-01-08 LAB — BACTERIA BLD CULT: NORMAL

## 2021-01-18 ENCOUNTER — TELEPHONE (OUTPATIENT)
Dept: HEMATOLOGY ONCOLOGY | Facility: CLINIC | Age: 71
End: 2021-01-18

## 2021-01-18 NOTE — TELEPHONE ENCOUNTER
Patient's  Gus Allen called to cancel patient's appt on 1- with Dr Abdalla and patient's Infusion on 1-  Patient is in [facility] in [place]   Any questions Gus Allen can be reached at [phone]

## 2021-01-27 ENCOUNTER — HOSPITAL ENCOUNTER (OUTPATIENT)
Dept: INFUSION CENTER | Facility: CLINIC | Age: 71
Discharge: HOME | End: 2021-01-27

## 2021-02-13 DIAGNOSIS — Z23 ENCOUNTER FOR IMMUNIZATION: ICD-10-CM
